# Patient Record
Sex: MALE | Race: WHITE | NOT HISPANIC OR LATINO | Employment: FULL TIME | URBAN - METROPOLITAN AREA
[De-identification: names, ages, dates, MRNs, and addresses within clinical notes are randomized per-mention and may not be internally consistent; named-entity substitution may affect disease eponyms.]

---

## 2017-04-11 LAB
C TRACH RRNA SPEC QL PROBE: NEGATIVE
N GONORRHOEA RRNA SPEC QL PROBE: NEGATIVE

## 2018-08-14 ENCOUNTER — HOSPITAL ENCOUNTER (EMERGENCY)
Facility: HOSPITAL | Age: 33
Discharge: HOME | End: 2018-08-14
Attending: EMERGENCY MEDICINE | Admitting: EMERGENCY MEDICINE
Payer: COMMERCIAL

## 2018-08-14 ENCOUNTER — APPOINTMENT (EMERGENCY)
Dept: CT IMAGING | Facility: HOSPITAL | Age: 33
End: 2018-08-14
Payer: COMMERCIAL

## 2018-08-14 VITALS
TEMPERATURE: 98.5 F | OXYGEN SATURATION: 99 % | SYSTOLIC BLOOD PRESSURE: 144 MMHG | RESPIRATION RATE: 18 BRPM | HEART RATE: 92 BPM | BODY MASS INDEX: 25.2 KG/M2 | DIASTOLIC BLOOD PRESSURE: 65 MMHG | HEIGHT: 71 IN | WEIGHT: 180 LBS

## 2018-08-14 DIAGNOSIS — M54.9 MUSCULOSKELETAL BACK PAIN: Primary | ICD-10-CM

## 2018-08-14 LAB
ANION GAP SERPL CALCULATED.3IONS-SCNC: 9 MMOL/L (ref 4–13)
BASOPHILS # BLD AUTO: 0.05 THOUSANDS/ΜL (ref 0–0.1)
BASOPHILS NFR BLD AUTO: 1 % (ref 0–1)
BUN SERPL-MCNC: 12 MG/DL (ref 5–25)
CALCIUM SERPL-MCNC: 10.1 MG/DL (ref 8.3–10.1)
CHLORIDE SERPL-SCNC: 101 MMOL/L (ref 100–108)
CO2 SERPL-SCNC: 30 MMOL/L (ref 21–32)
CREAT SERPL-MCNC: 0.93 MG/DL (ref 0.6–1.3)
EOSINOPHIL # BLD AUTO: 0.31 THOUSAND/ΜL (ref 0–0.61)
EOSINOPHIL NFR BLD AUTO: 3 % (ref 0–6)
ERYTHROCYTE [DISTWIDTH] IN BLOOD BY AUTOMATED COUNT: 12.7 % (ref 11.6–15.1)
GFR SERPL CREATININE-BSD FRML MDRD: 108 ML/MIN/1.73SQ M
GLUCOSE SERPL-MCNC: 103 MG/DL (ref 65–140)
HCT VFR BLD AUTO: 45.9 % (ref 36.5–49.3)
HGB BLD-MCNC: 16 G/DL (ref 12–17)
IMM GRANULOCYTES # BLD AUTO: 0.04 THOUSAND/UL (ref 0–0.2)
IMM GRANULOCYTES NFR BLD AUTO: 0 % (ref 0–2)
LYMPHOCYTES # BLD AUTO: 3.08 THOUSANDS/ΜL (ref 0.6–4.47)
LYMPHOCYTES NFR BLD AUTO: 29 % (ref 14–44)
MCH RBC QN AUTO: 32.3 PG (ref 26.8–34.3)
MCHC RBC AUTO-ENTMCNC: 34.9 G/DL (ref 31.4–37.4)
MCV RBC AUTO: 93 FL (ref 82–98)
MONOCYTES # BLD AUTO: 0.82 THOUSAND/ΜL (ref 0.17–1.22)
MONOCYTES NFR BLD AUTO: 8 % (ref 4–12)
NEUTROPHILS # BLD AUTO: 6.24 THOUSANDS/ΜL (ref 1.85–7.62)
NEUTS SEG NFR BLD AUTO: 59 % (ref 43–75)
NRBC BLD AUTO-RTO: 0 /100 WBCS
PLATELET # BLD AUTO: 270 THOUSANDS/UL (ref 149–390)
PMV BLD AUTO: 9.5 FL (ref 8.9–12.7)
POTASSIUM SERPL-SCNC: 3.9 MMOL/L (ref 3.5–5.3)
RBC # BLD AUTO: 4.96 MILLION/UL (ref 3.88–5.62)
SODIUM SERPL-SCNC: 140 MMOL/L (ref 136–145)
WBC # BLD AUTO: 10.54 THOUSAND/UL (ref 4.31–10.16)

## 2018-08-14 PROCEDURE — 36415 COLL VENOUS BLD VENIPUNCTURE: CPT | Performed by: EMERGENCY MEDICINE

## 2018-08-14 PROCEDURE — 96374 THER/PROPH/DIAG INJ IV PUSH: CPT

## 2018-08-14 PROCEDURE — 85025 COMPLETE CBC W/AUTO DIFF WBC: CPT | Performed by: EMERGENCY MEDICINE

## 2018-08-14 PROCEDURE — 96375 TX/PRO/DX INJ NEW DRUG ADDON: CPT

## 2018-08-14 PROCEDURE — 96361 HYDRATE IV INFUSION ADD-ON: CPT

## 2018-08-14 PROCEDURE — 99284 EMERGENCY DEPT VISIT MOD MDM: CPT

## 2018-08-14 PROCEDURE — 74176 CT ABD & PELVIS W/O CONTRAST: CPT

## 2018-08-14 PROCEDURE — 80048 BASIC METABOLIC PNL TOTAL CA: CPT | Performed by: EMERGENCY MEDICINE

## 2018-08-14 RX ORDER — MORPHINE SULFATE 4 MG/ML
4 INJECTION, SOLUTION INTRAMUSCULAR; INTRAVENOUS ONCE
Status: COMPLETED | OUTPATIENT
Start: 2018-08-14 | End: 2018-08-14

## 2018-08-14 RX ORDER — METHOCARBAMOL 500 MG/1
500 TABLET, FILM COATED ORAL 2 TIMES DAILY
Qty: 20 TABLET | Refills: 0 | Status: SHIPPED | OUTPATIENT
Start: 2018-08-14 | End: 2018-08-29

## 2018-08-14 RX ORDER — KETOROLAC TROMETHAMINE 30 MG/ML
15 INJECTION, SOLUTION INTRAMUSCULAR; INTRAVENOUS ONCE
Status: COMPLETED | OUTPATIENT
Start: 2018-08-14 | End: 2018-08-14

## 2018-08-14 RX ORDER — NAPROXEN 500 MG/1
500 TABLET ORAL 2 TIMES DAILY WITH MEALS
Qty: 30 TABLET | Refills: 0 | Status: SHIPPED | OUTPATIENT
Start: 2018-08-14 | End: 2018-08-29

## 2018-08-14 RX ORDER — LIDOCAINE 50 MG/G
1 PATCH TOPICAL ONCE
Status: DISCONTINUED | OUTPATIENT
Start: 2018-08-14 | End: 2018-08-14 | Stop reason: HOSPADM

## 2018-08-14 RX ADMIN — SODIUM CHLORIDE 1000 ML: 0.9 INJECTION, SOLUTION INTRAVENOUS at 19:27

## 2018-08-14 RX ADMIN — KETOROLAC TROMETHAMINE 15 MG: 30 INJECTION, SOLUTION INTRAMUSCULAR at 20:01

## 2018-08-14 RX ADMIN — LIDOCAINE 1 PATCH: 50 PATCH TOPICAL at 20:15

## 2018-08-14 RX ADMIN — MORPHINE SULFATE 4 MG: 4 INJECTION INTRAVENOUS at 19:27

## 2018-08-14 NOTE — ED PROVIDER NOTES
History  Chief Complaint   Patient presents with    Back Pain     got off couch earlier today and having back pain radiating to left leg/hip     HPI    This is a 36 year male that presents today with back pain  Patient states he was getting off from his couch today and felt a sudden onset of pain in his back that radiates down his left hip but also down his abdomen  States pain is flank area  States mild nausea but no vomiting  Also states he has some dysuria when going to the bathroom  No hematuria  Denies any history of kidney stones  Patient does have history of chronic back pain but states this feels different  States he has been having difficulty with ambulation  No numbness or tingling in his genital area  No urinary retention  No IV drug use  No fevers or chills  None       History reviewed  No pertinent past medical history  History reviewed  No pertinent surgical history  Family History   Problem Relation Age of Onset    Diabetes Father      I have reviewed and agree with the history as documented  Social History   Substance Use Topics    Smoking status: Current Every Day Smoker    Smokeless tobacco: Never Used    Alcohol use No        Review of Systems   Constitutional: Negative  Negative for diaphoresis and fever  HENT: Negative  Respiratory: Negative  Negative for cough, shortness of breath and wheezing  Cardiovascular: Negative  Negative for chest pain, palpitations and leg swelling  Gastrointestinal: Negative for abdominal distention, abdominal pain, nausea and vomiting  Genitourinary: Positive for dysuria and flank pain  Musculoskeletal: Positive for back pain  Skin: Negative  Neurological: Negative  Psychiatric/Behavioral: Negative  All other systems reviewed and are negative  Physical Exam  Physical Exam   Constitutional: He is oriented to person, place, and time  He appears well-developed and well-nourished  No distress     HENT: Head: Normocephalic and atraumatic  Nose: Nose normal    Mouth/Throat: Oropharynx is clear and moist    Eyes: Conjunctivae and EOM are normal  Pupils are equal, round, and reactive to light  Neck: Normal range of motion  Neck supple  Cardiovascular: Normal rate, regular rhythm and normal heart sounds  No murmur heard  Pulmonary/Chest: Effort normal and breath sounds normal  No respiratory distress  He has no wheezes  He has no rales  Abdominal: Soft  Bowel sounds are normal  He exhibits no distension  There is tenderness  There is no rebound and no guarding  Mild left lower abdominal pain     Musculoskeletal: Normal range of motion  He exhibits no edema, tenderness or deformity  Left flank pain and lower back tenderness  No stepoffs  No rashes   Neurological: He is alert and oriented to person, place, and time  No cranial nerve deficit  Skin: Skin is warm and dry  No rash noted  He is not diaphoretic  No pallor  Psychiatric: He has a normal mood and affect  Vitals reviewed        Vital Signs  ED Triage Vitals [08/14/18 1827]   Temperature Pulse Respirations Blood Pressure SpO2   98 5 °F (36 9 °C) 92 18 144/65 99 %      Temp src Heart Rate Source Patient Position - Orthostatic VS BP Location FiO2 (%)   -- -- -- -- --      Pain Score       9           Vitals:    08/14/18 1827   BP: 144/65   Pulse: 92       Visual Acuity      ED Medications  Medications   sodium chloride 0 9 % bolus 1,000 mL (0 mL Intravenous Stopped 8/14/18 2040)   morphine (PF) 4 mg/mL injection 4 mg (4 mg Intravenous Given 8/14/18 1927)   ketorolac (TORADOL) injection 15 mg (15 mg Intravenous Given 8/14/18 2001)       Diagnostic Studies  Results Reviewed     Procedure Component Value Units Date/Time    Basic metabolic panel [[id number]] Collected:  08/14/18 1928    Lab Status:  Final result Specimen:  Blood from Arm, Left Updated:  08/14/18 1948     Sodium 140 mmol/L      Potassium 3 9 mmol/L      Chloride 101 mmol/L      CO2 30 mmol/L      Anion Gap 9 mmol/L      BUN 12 mg/dL      Creatinine 0 93 mg/dL      Glucose 103 mg/dL      Calcium 10 1 mg/dL      eGFR 108 ml/min/1 73sq m     Narrative:         National Kidney Disease Education Program recommendations are as follows:  GFR calculation is accurate only with a steady state creatinine  Chronic Kidney disease less than 60 ml/min/1 73 sq  meters  Kidney failure less than 15 ml/min/1 73 sq  meters  CBC and differential [[id number]]  (Abnormal) Collected:  08/14/18 1928    Lab Status:  Final result Specimen:  Blood from Arm, Left Updated:  08/14/18 1937     WBC 10 54 (H) Thousand/uL      RBC 4 96 Million/uL      Hemoglobin 16 0 g/dL      Hematocrit 45 9 %      MCV 93 fL      MCH 32 3 pg      MCHC 34 9 g/dL      RDW 12 7 %      MPV 9 5 fL      Platelets 793 Thousands/uL      nRBC 0 /100 WBCs      Neutrophils Relative 59 %      Immat GRANS % 0 %      Lymphocytes Relative 29 %      Monocytes Relative 8 %      Eosinophils Relative 3 %      Basophils Relative 1 %      Neutrophils Absolute 6 24 Thousands/µL      Immature Grans Absolute 0 04 Thousand/uL      Lymphocytes Absolute 3 08 Thousands/µL      Monocytes Absolute 0 82 Thousand/µL      Eosinophils Absolute 0 31 Thousand/µL      Basophils Absolute 0 05 Thousands/µL                  CT renal stone study abdomen pelvis without contrast   Final Result by Autumn Nicole MD (08/14 2005)      No acute inflammatory changes in the abdomen or pelvis  No obstructive uropathy  Workstation performed: [id number]                    Procedures  Procedures       Phone Contacts  ED Phone Contact    ED Course  ED Course as of Aug 16 1102   Tue Aug 14, 2018   2029 Will discharge patient, advised to followup with orthopedics   Doing better with pain                                 MDM  CritCare Time    Disposition  Final diagnoses:   Musculoskeletal back pain     Time reflects when diagnosis was documented in both MDM as applicable and the Disposition within this note     Time User Action Codes Description Comment    8/14/2018  8:30 PM Nicolas Gamble Add [M54 9] Musculoskeletal back pain       ED Disposition     ED Disposition Condition Comment    Discharge  Meseret Noriega discharge to home/self care  Condition at discharge: Good        Follow-up Information     Follow up With Specialties Details Why [address], MD Orthopedic Surgery Schedule an appointment as soon as possible for a visit  [address]  [facility]      Abena Hunter MD Family Medicine Schedule an appointment as soon as possible for a visit  [address] [facility] [address] [facility]  [phone]            Discharge Medication List as of 8/14/2018  8:31 PM      START taking these medications    Details   methocarbamol (ROBAXIN) 500 mg tablet Take 1 tablet (500 mg total) by mouth 2 (two) times a day, Starting Tue 8/14/2018, Print      naproxen (NAPROSYN) 500 mg tablet Take 1 tablet (500 mg total) by mouth 2 (two) times a day with meals, Starting Tue 8/14/2018, Print           No discharge procedures on file      ED Provider  Electronically Signed by           Eitan Soto MD  08/16/18 1312

## 2018-08-15 ENCOUNTER — TELEPHONE (OUTPATIENT)
Dept: PAIN MEDICINE | Facility: MEDICAL CENTER | Age: 33
End: 2018-08-15

## 2018-08-15 VITALS — BODY MASS INDEX: 26.88 KG/M2 | WEIGHT: 192 LBS | HEIGHT: 71 IN

## 2018-08-15 DIAGNOSIS — M51.37 DEGENERATIVE DISC DISEASE AT L5-S1 LEVEL: Primary | ICD-10-CM

## 2018-08-15 DIAGNOSIS — M54.41 ACUTE BILATERAL LOW BACK PAIN WITH BILATERAL SCIATICA: ICD-10-CM

## 2018-08-15 DIAGNOSIS — M54.16 RADICULOPATHY, LUMBAR REGION: ICD-10-CM

## 2018-08-15 DIAGNOSIS — M54.42 ACUTE BILATERAL LOW BACK PAIN WITH BILATERAL SCIATICA: ICD-10-CM

## 2018-08-15 PROCEDURE — 99204 OFFICE O/P NEW MOD 45 MIN: CPT | Performed by: FAMILY MEDICINE

## 2018-08-15 RX ORDER — TRAMADOL HYDROCHLORIDE 50 MG/1
50 TABLET ORAL EVERY 8 HOURS PRN
Qty: 30 TABLET | Refills: 0 | Status: SHIPPED | OUTPATIENT
Start: 2018-08-15 | End: 2018-08-24 | Stop reason: SDUPTHER

## 2018-08-15 RX ORDER — PREDNISONE 20 MG/1
20 TABLET ORAL DAILY
Qty: 6 TABLET | Refills: 0 | Status: SHIPPED | OUTPATIENT
Start: 2018-08-15 | End: 2018-08-21
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
## 2018-08-15 NOTE — DISCHARGE INSTRUCTIONS
Acute Low Back Pain   WHAT YOU NEED TO KNOW:   Acute low back pain is sudden discomfort in your lower back area that lasts for up to 6 weeks  The discomfort makes it difficult to tolerate activity  DISCHARGE INSTRUCTIONS:   Seek care immediately or call 911 if:   · You have severe pain  · You have sudden stiffness and heaviness on both buttocks down to both legs  · You have numbness or weakness in one leg, or pain in both legs  · You have numbness in your genital area or across your lower back  · You cannot control your urine or bowel movements  Contact your healthcare provider if:   · You have a fever  · You have pain at night or when you rest     · Your pain does not get better with treatment  · You have pain that worsens when you cough or sneeze  · You suddenly feel something pop or snap in your back  · You have questions or concerns about your condition or care  Medicines: The following medicines may be ordered by your healthcare provider:  · Acetaminophen  decreases pain  It is available without a doctor's order  Ask how much to take and how often to take it  Follow directions  Acetaminophen can cause liver damage if not taken correctly  · NSAIDs  help decrease swelling and pain  This medicine is available with or without a doctor's order  NSAIDs can cause stomach bleeding or kidney problems in certain people  If you take blood thinner medicine, always ask your healthcare provider if NSAIDs are safe for you  Always read the medicine label and follow directions  · Prescription pain medicine  may be given  Ask your healthcare provider how to take this medicine safely  · Muscle relaxers  decrease pain by relaxing the muscles in your lower spine  · Take your medicine as directed  Contact your healthcare provider if you think your medicine is not helping or if you have side effects  Tell him of her if you are allergic to any medicine   Keep a list of the medicines, vitamins, and herbs you take  Include the amounts, and when and why you take them  Bring the list or the pill bottles to follow-up visits  Carry your medicine list with you in case of an emergency  Self-care:   · Stay active  as much as you can without causing more pain  Bed rest could make your back pain worse  Start with some light exercises such as walking  Avoid heavy lifting until your pain is gone  Ask for more information about the activities or exercises that are right for you  · Ice  helps decrease swelling, pain, and muscle spams  Put crushed ice in a plastic bag  Cover it with a towel  Place it on your lower back for 20 to 30 minutes every 2 hours  Do this for about 2 to 3 days after your pain starts, or as directed  · Heat  helps decrease pain and muscle spasms  Start to use heat after treatment with ice has stopped  Use a small towel dampened with warm water or a heating pad, or sit in a warm bath  Apply heat on the area for 20 to 30 minutes every 2 hours for as many days as directed  Alternate heat and ice  Prevent acute low back pain:   · Use proper body mechanics  ¨ Bend at the hips and knees when you  objects  Do not bend from the waist  Use your leg muscles as you lift the load  Do not use your back  Keep the object close to your chest as you lift it  Try not to twist or lift anything above your waist     ¨ Change your position often when you stand for long periods of time  Rest one foot on a small box or footrest, and then switch to the other foot often  ¨ Try not to sit for long periods of time  When you do, sit in a straight-backed chair with your feet flat on the floor  Never reach, pull, or push while you are sitting  · Do exercises that strengthen your back muscles  Warm up before you exercise  Ask your healthcare provider the best exercises for you  · Maintain a healthy weight  Ask your healthcare provider how much you should weigh   Ask him to help you create a weight loss plan if you are overweight  Follow up with your healthcare provider as directed:  Return for a follow-up visit if you still have pain after 1 to 3 weeks of treatment  You may need to visit an orthopedist if your back pain lasts more than 6 to 12 weeks  Write down your questions so you remember to ask them during your visits  © 2017 [address] [employer]  Information is for End User's use only and may not be sold, redistributed or otherwise used for commercial purposes  All illustrations and images included in CareNotes® are the copyrighted property of [employer]  or [employer]  The above information is an  only  It is not intended as medical advice for individual conditions or treatments  Talk to your doctor, nurse or pharmacist before following any medical regimen to see if it is safe and effective for you

## 2018-08-15 NOTE — LETTER
August 15, 2018     Patient: Susannah Giraldo   YOB: 1985   Date of Visit: 8/15/2018       To Whom it May Concern:    Susannah Giraldo is under my professional care  He was seen in my office on 8/15/2018  He is unable to return to work at this time due to back pain  He is being sent for MRI and will be re-assessed at next visit  If you have any questions or concerns, please don't hesitate to call           Sincerely,          Julieth [employer], DO        CC: No Recipients

## 2018-08-15 NOTE — TELEPHONE ENCOUNTER
Patients wife called stating patient was given meds in the ED last night and given meds today at [address]   stated note sure if patient should be taking all the meds  Also wants to know if patient could get an Rx for an MRI   C/b [phone]

## 2018-08-22 ENCOUNTER — HOSPITAL ENCOUNTER (OUTPATIENT)
Dept: MRI IMAGING | Facility: HOSPITAL | Age: 33
Discharge: HOME | End: 2018-08-22
Payer: COMMERCIAL

## 2018-08-22 DIAGNOSIS — M54.41 ACUTE BILATERAL LOW BACK PAIN WITH BILATERAL SCIATICA: ICD-10-CM

## 2018-08-22 DIAGNOSIS — M51.37 DEGENERATIVE DISC DISEASE AT L5-S1 LEVEL: ICD-10-CM

## 2018-08-22 DIAGNOSIS — M54.42 ACUTE BILATERAL LOW BACK PAIN WITH BILATERAL SCIATICA: ICD-10-CM

## 2018-08-22 DIAGNOSIS — M54.16 RADICULOPATHY, LUMBAR REGION: ICD-10-CM

## 2018-08-22 PROCEDURE — 72148 MRI LUMBAR SPINE W/O DYE: CPT

## 2018-08-24 DIAGNOSIS — M54.41 ACUTE BILATERAL LOW BACK PAIN WITH BILATERAL SCIATICA: ICD-10-CM

## 2018-08-24 DIAGNOSIS — M54.42 ACUTE BILATERAL LOW BACK PAIN WITH BILATERAL SCIATICA: ICD-10-CM

## 2018-08-24 RX ORDER — TRAMADOL HYDROCHLORIDE 50 MG/1
50 TABLET ORAL EVERY 8 HOURS PRN
Qty: 30 TABLET | Refills: 0 | Status: SHIPPED | OUTPATIENT
Start: 2018-08-24 | End: 2018-08-29

## 2018-08-24 NOTE — TELEPHONE ENCOUNTER
Call from Damaris Narayan, patients wife   Call back # [phone]  Larissa Scott is requesting a call asap to discuss patients current pain  I offered an appointment with you today in [facility], they declined due to distance  Patient had an MRI on Wednesday 08/22/18     IMPRESSION:     L4-5 and L5-S1 central and left paracentral disc herniations, larger at L4-5 where there is distortion of the left anterior lateral aspect of the thecal sac and posterior displacement of the L5 nerve  Correlate for corresponding radiculopathy      Patient rescheduled appt with you to 09/10/18 because he has to work next week

## 2018-08-24 NOTE — TELEPHONE ENCOUNTER
Chaz Vega  Call Back: [phone]  Dr Stella Banerjee    Patient wife called back stating that her  is in excruciating pain and his foot is completely numb he can't feel his foot  I once again asked his wife if the patient would be able to come in today  She said no due to patient having to work  Patient's wife said that she want's a call back by the end of an work day, or she is calling the   Patient wife said they will go to the ER if they have to but they weren't any help the last time they went

## 2018-08-29 ENCOUNTER — OFFICE VISIT (OUTPATIENT)
Dept: OBGYN CLINIC | Facility: CLINIC | Age: 33
End: 2018-08-29
Payer: COMMERCIAL

## 2018-08-29 VITALS
HEIGHT: 71 IN | DIASTOLIC BLOOD PRESSURE: 67 MMHG | SYSTOLIC BLOOD PRESSURE: 103 MMHG | HEART RATE: 76 BPM | WEIGHT: 191 LBS | BODY MASS INDEX: 26.74 KG/M2

## 2018-08-29 DIAGNOSIS — M51.36 DEGENERATIVE DISC DISEASE, LUMBAR: ICD-10-CM

## 2018-08-29 DIAGNOSIS — M54.16 RADICULOPATHY, LUMBAR REGION: ICD-10-CM

## 2018-08-29 DIAGNOSIS — M51.26 PROTRUSION OF LUMBAR INTERVERTEBRAL DISC: Primary | ICD-10-CM

## 2018-08-29 PROCEDURE — 99213 OFFICE O/P EST LOW 20 MIN: CPT | Performed by: FAMILY MEDICINE

## 2018-08-29 RX ORDER — PREDNISONE 20 MG/1
20 TABLET ORAL DAILY
Qty: 6 TABLET | Refills: 0 | Status: SHIPPED | OUTPATIENT
Start: 2018-08-29 | End: 2018-09-04

## 2018-08-29 NOTE — PROGRESS NOTES
Assessment/Plan:  Assessment/Plan   Diagnoses and all orders for this visit:    Protrusion of lumbar intervertebral disc  -     Ambulatory referral to Pain Management; Future  -     predniSONE 20 mg tablet; Take 1 tablet (20 mg total) by mouth daily for 6 days    Radiculopathy, lumbar region  -     Ambulatory referral to Pain Management; Future  -     predniSONE 20 mg tablet; Take 1 tablet (20 mg total) by mouth daily for 6 days    Degenerative disc disease, lumbar          62-year-old male with worsening low back pain and left lower extremity radiculopathy  Discussed with patient physical exam, MRI findings, impression, and plan  MRI lumbar spine is noted for L4-L5 disc desiccation with mild disc height loss, with small left paracentral disc protrusion distal in the left anterolateral aspect of the thecal sac and displacing the left L5 nerve  Posteriorly, and L5-S1 mild annular bulging with small central left paracentral annular fissure  His physical exam is noted for weakness with left hip flexion and knee extension with decreased sensation light touch dermatomes   L4-S1 the left lower extremity  At this time I will refer him to pain management for further evaluation and recommendation treatment options  I will also prescribe him course of oral prednisone 20 mg once daily for 6 days  Further care and management as per pain management  Subjective:   Patient ID: Herminia Arroyo is a 29 y o  male  Chief Complaint   Patient presents with    Lower Back - Pain, Follow-up       62-year-old male following up for low back pain of worsening chronic low back pain  He was last seen 2 weeks ago which point he was referred for MRI of the lumbar spine due to sudden worsening of left lower extremity weakness and numbness  He was prescribed course of prednisone  Today reports mild improvement since taking the prednisone    He still continues have pain that is described as localized to the lumbosacral area, constant, sharp, radiating distally to both lower extremities, and associated numbness/tingling of both lower extremities  Back Pain   This is a recurrent problem  The current episode started 1 to 4 weeks ago  The problem occurs constantly  The problem has been gradually improving  Associated symptoms include numbness and weakness  The symptoms are aggravated by walking, twisting and standing  He has tried rest (Oral prednisone) for the symptoms  The treatment provided mild relief  Review of Systems   Musculoskeletal: Positive for back pain  Neurological: Positive for weakness and numbness  Objective:  Vitals:    08/29/18 0936   BP: 103/67   Pulse: 76   Weight: 86 6 kg (191 lb)   Height: 5' 11" (1 803 m)     Right Hip Exam     Muscle Strength   Flexion: 5/5       Left Hip Exam     Muscle Strength   Flexion: 4/5       Back Exam     Range of Motion   Extension: abnormal   Flexion: abnormal   Lateral Bend Right: abnormal   Lateral Bend Left: abnormal   Rotation Right: abnormal   Rotation Left: abnormal     Muscle Strength   Right Quadriceps:  5/5   Left Quadriceps:  4/5     Other   Sensation: decreased  Gait: normal     Comments:  Decreased sensation light touch dermatomes L4-S1 left lower extremity            Physical Exam   Constitutional: He is oriented to person, place, and time  He appears well-developed  HENT:   Head: Normocephalic and atraumatic  Eyes: Conjunctivae are normal    Neck: No tracheal deviation present  Cardiovascular: Normal rate  Pulmonary/Chest: Effort normal  No respiratory distress  Abdominal: He exhibits no distension  Neurological: He is alert and oriented to person, place, and time  Skin: Skin is warm and dry  Psychiatric: He has a normal mood and affect  His behavior is normal    Nursing note and vitals reviewed  I have personally reviewed pertinent films in PACS and my interpretation is Lumbosacral disc herniation L4-L5 and L5-S1

## 2018-11-13 ENCOUNTER — TELEPHONE (OUTPATIENT)
Dept: GASTROENTEROLOGY | Facility: CLINIC | Age: 33
End: 2018-11-13

## 2018-11-13 ENCOUNTER — TRANSCRIBE ORDERS (OUTPATIENT)
Dept: LAB | Facility: CLINIC | Age: 33
End: 2018-11-13

## 2018-11-13 ENCOUNTER — HOSPITAL ENCOUNTER (OUTPATIENT)
Dept: CT IMAGING | Facility: HOSPITAL | Age: 33
Discharge: HOME | End: 2018-11-13
Payer: COMMERCIAL

## 2018-11-13 ENCOUNTER — APPOINTMENT (OUTPATIENT)
Dept: LAB | Facility: CLINIC | Age: 33
End: 2018-11-13
Payer: COMMERCIAL

## 2018-11-13 ENCOUNTER — OFFICE VISIT (OUTPATIENT)
Dept: GASTROENTEROLOGY | Facility: CLINIC | Age: 33
End: 2018-11-13

## 2018-11-13 VITALS
RESPIRATION RATE: 16 BRPM | DIASTOLIC BLOOD PRESSURE: 60 MMHG | WEIGHT: 201 LBS | HEIGHT: 71 IN | SYSTOLIC BLOOD PRESSURE: 112 MMHG | HEART RATE: 94 BPM | BODY MASS INDEX: 28.14 KG/M2

## 2018-11-13 DIAGNOSIS — R10.33 PERIUMBILICAL PAIN, CHRONIC: ICD-10-CM

## 2018-11-13 DIAGNOSIS — G43.819 OTHER MIGRAINE WITHOUT STATUS MIGRAINOSUS, INTRACTABLE: ICD-10-CM

## 2018-11-13 DIAGNOSIS — K64.8 INTERNAL HEMORRHOIDS: ICD-10-CM

## 2018-11-13 DIAGNOSIS — K61.1 PERIRECTAL ABSCESS: ICD-10-CM

## 2018-11-13 DIAGNOSIS — Z87.19 HISTORY OF RECTAL ABSCESS: ICD-10-CM

## 2018-11-13 DIAGNOSIS — G89.29 PERIUMBILICAL PAIN, CHRONIC: ICD-10-CM

## 2018-11-13 DIAGNOSIS — R13.13 PHARYNGEAL DYSPHAGIA: ICD-10-CM

## 2018-11-13 DIAGNOSIS — R11.0 NAUSEA: ICD-10-CM

## 2018-11-13 DIAGNOSIS — R53.83 OTHER FATIGUE: ICD-10-CM

## 2018-11-13 DIAGNOSIS — K62.89 ANAL OR RECTAL PAIN: Primary | ICD-10-CM

## 2018-11-13 DIAGNOSIS — R68.83 CHILLS: ICD-10-CM

## 2018-11-13 DIAGNOSIS — R63.0 DECREASED APPETITE: ICD-10-CM

## 2018-11-13 DIAGNOSIS — K62.89 RECTAL PAIN: ICD-10-CM

## 2018-11-13 DIAGNOSIS — K61.1 PERI-RECTAL ABSCESS: ICD-10-CM

## 2018-11-13 DIAGNOSIS — R19.8 UMBILICAL BLEEDING: ICD-10-CM

## 2018-11-13 DIAGNOSIS — K62.89 RECTAL PAIN: Primary | ICD-10-CM

## 2018-11-13 PROBLEM — G43.909 MIGRAINE: Status: ACTIVE | Noted: 2018-11-13

## 2018-11-13 PROBLEM — R13.10 DYSPHAGIA: Status: ACTIVE | Noted: 2018-11-13

## 2018-11-13 PROBLEM — R11.2 NAUSEA AND VOMITING: Status: ACTIVE | Noted: 2018-11-13

## 2018-11-13 LAB
ALBUMIN SERPL BCP-MCNC: 4 G/DL (ref 3.5–5)
ALP SERPL-CCNC: 74 U/L (ref 46–116)
ALT SERPL W P-5'-P-CCNC: 25 U/L (ref 12–78)
ANION GAP SERPL CALCULATED.3IONS-SCNC: 4 MMOL/L (ref 4–13)
AST SERPL W P-5'-P-CCNC: 15 U/L (ref 5–45)
BASOPHILS # BLD AUTO: 0.06 THOUSANDS/ΜL (ref 0–0.1)
BASOPHILS NFR BLD AUTO: 1 % (ref 0–1)
BILIRUB SERPL-MCNC: 0.4 MG/DL (ref 0.2–1)
BUN SERPL-MCNC: 9 MG/DL (ref 5–25)
CALCIUM SERPL-MCNC: 8.8 MG/DL (ref 8.3–10.1)
CHLORIDE SERPL-SCNC: 105 MMOL/L (ref 100–108)
CO2 SERPL-SCNC: 29 MMOL/L (ref 21–32)
CREAT SERPL-MCNC: 0.82 MG/DL (ref 0.6–1.3)
EOSINOPHIL # BLD AUTO: 0.32 THOUSAND/ΜL (ref 0–0.61)
EOSINOPHIL NFR BLD AUTO: 3 % (ref 0–6)
ERYTHROCYTE [DISTWIDTH] IN BLOOD BY AUTOMATED COUNT: 12.7 % (ref 11.6–15.1)
ERYTHROCYTE [SEDIMENTATION RATE] IN BLOOD: 7 MM/HOUR (ref 0–10)
FERRITIN SERPL-MCNC: 56 NG/ML (ref 8–388)
GFR SERPL CREATININE-BSD FRML MDRD: 117 ML/MIN/1.73SQ M
GLUCOSE P FAST SERPL-MCNC: 86 MG/DL (ref 65–99)
HCT VFR BLD AUTO: 42.3 % (ref 36.5–49.3)
HGB BLD-MCNC: 14.1 G/DL (ref 12–17)
IMM GRANULOCYTES # BLD AUTO: 0.03 THOUSAND/UL (ref 0–0.2)
IMM GRANULOCYTES NFR BLD AUTO: 0 % (ref 0–2)
LYMPHOCYTES # BLD AUTO: 2.92 THOUSANDS/ΜL (ref 0.6–4.47)
LYMPHOCYTES NFR BLD AUTO: 23 % (ref 14–44)
MCH RBC QN AUTO: 32.3 PG (ref 26.8–34.3)
MCHC RBC AUTO-ENTMCNC: 33.3 G/DL (ref 31.4–37.4)
MCV RBC AUTO: 97 FL (ref 82–98)
MONOCYTES # BLD AUTO: 0.8 THOUSAND/ΜL (ref 0.17–1.22)
MONOCYTES NFR BLD AUTO: 6 % (ref 4–12)
NEUTROPHILS # BLD AUTO: 8.48 THOUSANDS/ΜL (ref 1.85–7.62)
NEUTS SEG NFR BLD AUTO: 67 % (ref 43–75)
NRBC BLD AUTO-RTO: 0 /100 WBCS
PLATELET # BLD AUTO: 297 THOUSANDS/UL (ref 149–390)
PMV BLD AUTO: 9.8 FL (ref 8.9–12.7)
POTASSIUM SERPL-SCNC: 4.2 MMOL/L (ref 3.5–5.3)
PROT SERPL-MCNC: 6.9 G/DL (ref 6.4–8.2)
RBC # BLD AUTO: 4.37 MILLION/UL (ref 3.88–5.62)
SODIUM SERPL-SCNC: 138 MMOL/L (ref 136–145)
WBC # BLD AUTO: 12.61 THOUSAND/UL (ref 4.31–10.16)

## 2018-11-13 PROCEDURE — 82728 ASSAY OF FERRITIN: CPT

## 2018-11-13 PROCEDURE — 74177 CT ABD & PELVIS W/CONTRAST: CPT

## 2018-11-13 PROCEDURE — 86255 FLUORESCENT ANTIBODY SCREEN: CPT

## 2018-11-13 PROCEDURE — 36415 COLL VENOUS BLD VENIPUNCTURE: CPT

## 2018-11-13 PROCEDURE — 86671 FUNGUS NES ANTIBODY: CPT

## 2018-11-13 PROCEDURE — 80053 COMPREHEN METABOLIC PANEL: CPT

## 2018-11-13 PROCEDURE — 85025 COMPLETE CBC W/AUTO DIFF WBC: CPT

## 2018-11-13 PROCEDURE — 99203 OFFICE O/P NEW LOW 30 MIN: CPT | Performed by: INTERNAL MEDICINE

## 2018-11-13 PROCEDURE — 83516 IMMUNOASSAY NONANTIBODY: CPT

## 2018-11-13 PROCEDURE — 82784 ASSAY IGA/IGD/IGG/IGM EACH: CPT

## 2018-11-13 PROCEDURE — 85652 RBC SED RATE AUTOMATED: CPT

## 2018-11-13 RX ORDER — CIPROFLOXACIN 500 MG/1
500 TABLET, FILM COATED ORAL EVERY 12 HOURS SCHEDULED
Qty: 28 TABLET | Refills: 0 | Status: ON HOLD | OUTPATIENT
Start: 2018-11-13 | End: 2018-11-21 | Stop reason: ALTCHOICE

## 2018-11-13 RX ORDER — ONDANSETRON 4 MG/1
4 TABLET, FILM COATED ORAL EVERY 8 HOURS PRN
Qty: 20 TABLET | Refills: 0 | Status: SHIPPED | OUTPATIENT
Start: 2018-11-13 | End: 2019-02-06 | Stop reason: SDUPTHER

## 2018-11-13 RX ORDER — OMEPRAZOLE 20 MG/1
20 CAPSULE, DELAYED RELEASE ORAL
Qty: 60 CAPSULE | Refills: 0 | Status: SHIPPED | OUTPATIENT
Start: 2018-11-13 | End: 2019-03-14 | Stop reason: HOSPADM

## 2018-11-13 RX ORDER — METRONIDAZOLE 500 MG/1
500 TABLET ORAL EVERY 12 HOURS SCHEDULED
Qty: 28 TABLET | Refills: 0 | Status: SHIPPED | OUTPATIENT
Start: 2018-11-13 | End: 2018-11-27

## 2018-11-13 RX ADMIN — IOHEXOL 100 ML: 350 INJECTION, SOLUTION INTRAVENOUS at 13:39

## 2018-11-13 RX ADMIN — IOHEXOL 50 ML: 240 INJECTION, SOLUTION INTRATHECAL; INTRAVASCULAR; INTRAVENOUS; ORAL at 11:30

## 2018-11-13 NOTE — PROGRESS NOTES
Assessment/Plan:    Rectal abscess  Fatigue  Chills  Poor appetite with nausea and vomiting  Abdominal pain  Umbilical bleeding    Rectal pain focused on left side where he has had two rectal abscesses drained recently, fatigue, chills, weight loss,no appetite, left side extreme tenderness with rectal exam- could not even do an internal due to pain, - patient feels it may be another abscess:  1  Blood work today including CBC with differential  2  Stat CT with IV and oral contrast of abdomen and pelvis    History of rectal abscess x2, decreased appetite, weight loss,   Initial colonoscopy    Nausea, vomiting, dysphagia, history of recent antibiotics  1  Endoscopy  2  Omeprazole b i d  Addendum:   Patient with elevated white blood cell count and perirectal swelling on the left side with left side rectal pain  Patient instructed on Cipro and Flagyl to take as prescribed and I sent over Zofran in case he gets sick to his stomach from the antibiotics  If his symptoms worsen on the antibiotics he has to go to the emergency room and we talked about fever, nausea, vomiting, worsening rectal pain or rectal discharge  Diagnoses and all orders for this visit:    Rectal pain  -     Sedimentation rate, automated; Future  -     Celiac Disease Antibody Profile; Future  -     CBC and differential; Future  -     Ferritin; Future  -     Comprehensive metabolic panel; Future  -     Inflammatory Bowel Disease-IBD; Future  -     Calprotectin,Fecal; Future  -     CT abdomen pelvis w contrast; Future    History of rectal abscess  -     Sedimentation rate, automated; Future  -     Celiac Disease Antibody Profile; Future  -     CBC and differential; Future  -     Ferritin; Future  -     Comprehensive metabolic panel; Future  -     Inflammatory Bowel Disease-IBD; Future  -     Calprotectin,Fecal; Future  -     CT abdomen pelvis w contrast; Future    Nausea  -     Sedimentation rate, automated;  Future  -     Celiac Disease Antibody Profile; Future  -     CBC and differential; Future  -     Ferritin; Future  -     Comprehensive metabolic panel; Future  -     Inflammatory Bowel Disease-IBD; Future  -     Calprotectin,Fecal; Future  -     CT abdomen pelvis w contrast; Future  -     omeprazole (PriLOSEC) 20 mg delayed release capsule; Take 1 capsule (20 mg total) by mouth 2 (two) times a day before meals for 30 days  -     ondansetron (ZOFRAN) 4 mg tablet; Take 1 tablet (4 mg total) by mouth every 8 (eight) hours as needed for nausea or vomiting    Pharyngeal dysphagia  -     Sedimentation rate, automated; Future  -     Celiac Disease Antibody Profile; Future  -     CBC and differential; Future  -     Ferritin; Future  -     Comprehensive metabolic panel; Future  -     Inflammatory Bowel Disease-IBD; Future  -     Calprotectin,Fecal; Future  -     omeprazole (PriLOSEC) 20 mg delayed release capsule; Take 1 capsule (20 mg total) by mouth 2 (two) times a day before meals for 30 days    Periumbilical pain, chronic  -     Sedimentation rate, automated; Future  -     Celiac Disease Antibody Profile; Future  -     CBC and differential; Future  -     Ferritin; Future  -     Comprehensive metabolic panel; Future  -     Inflammatory Bowel Disease-IBD; Future  -     Calprotectin,Fecal; Future  -     CT abdomen pelvis w contrast; Future    Umbilical bleeding  -     Sedimentation rate, automated; Future  -     Celiac Disease Antibody Profile; Future  -     CBC and differential; Future  -     Ferritin; Future  -     Comprehensive metabolic panel; Future  -     Inflammatory Bowel Disease-IBD; Future  -     Calprotectin,Fecal; Future  -     CT abdomen pelvis w contrast; Future    Internal hemorrhoids  -     Sedimentation rate, automated; Future  -     Celiac Disease Antibody Profile; Future  -     CBC and differential; Future  -     Ferritin; Future  -     Comprehensive metabolic panel; Future  -     Inflammatory Bowel Disease-IBD;  Future  - Calprotectin,Fecal; Future    Other fatigue    Decreased appetite    Other migraine without status migrainosus, intractable    Toshia-rectal abscess  -     ciprofloxacin (CIPRO) 500 mg tablet; Take 1 tablet (500 mg total) by mouth every 12 (twelve) hours for 14 days  -     metroNIDAZOLE (FLAGYL) 500 mg tablet; Take 1 tablet (500 mg total) by mouth every 12 (twelve) hours for 14 days    Chills    Perirectal abscess            Subjective:      Patient ID: Rehan Milan is a 29 y o  male  36-year-old male who was initially seen in April for rectal abscess and had I&D done and was on a antibiotics then he had same symptoms which included chills, rectal pain, decreased appetite and had another incision and drainage done at the and of October 2018  The surgeon's concern was that it may be inflammatory in nature because patient does not have any history of rectal manipulation and recommendation was for colonoscopy  He is here today complaining of fatigue, chills, left side rectal pain, change in bowel habits two from daily soft inform to every other day to daily loose without melena or hematochezia  Upon rectal exam as I touched his left buttock he had extreme pain in his left rectal area and so I did not do an internal exam but we will obtain a stat CT scan  He has had some weight loss recently and nausea, vomiting and dysphagia  He does not take any medications  He continues to smoke cigarettes and he does smoke marijuana every night  I did ask him to begin the smoking patches that the state prescribed and also to not smoke any marijuana   He also reports on and off over the past couple years intermittent periumbilical pain that feels like she has tearing in nature not associated with food or bowel movements and then some bleeding in his umbilicus about the same time  Recent CT with IV contrast did not show anything abnormal in his colon or she has small intestine    He has no family history of colon cancer or of inflammatory bowel disorder  He feels very fatigued and has more recently had chills and so instead of CT enterography as I initially was planning we will get a stat CT of his abdomen and pelvis with IV and p o  contrast   He will also have an endoscopy for his upper GI symptoms and a colonoscopy  He will obtain blood work today and stool tests when he is able  He will follow-up 1- 2 weeks after his testing is complete  He drives [place] 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  He does not have a PCP and so I did give him a referral to Dr Mckinley Knife        The following portions of the patient's history were reviewed and updated as appropriate: He  has no past medical history on file  He   Patient Active Problem List    Diagnosis Date Noted    Internal hemorrhoids 65/73/7322    Umbilical bleeding 53/30/1724    Nausea 11/13/2018    Rectal pain 11/13/2018    Pharyngeal dysphagia 11/13/2018    History of rectal abscess 32/93/0981    Periumbilical pain, chronic 11/56/1303    Decreased appetite 11/13/2018    Other fatigue 11/13/2018    Migraine 11/13/2018    Toshia-rectal abscess 11/13/2018    Nausea and vomiting 11/13/2018    Dysphagia 11/13/2018    Rectal abscess 11/13/2018    Perirectal abscess 11/13/2018     He  has a past surgical history that includes Abscess drainage (10/29/2018)  His family history includes Diabetes in his father  He  reports that he has been smoking  He has never used smokeless tobacco  He reports that he does not drink alcohol or use drugs    Current Outpatient Prescriptions   Medication Sig Dispense Refill    ciprofloxacin (CIPRO) 500 mg tablet Take 1 tablet (500 mg total) by mouth every 12 (twelve) hours for 14 days 28 tablet 0    metroNIDAZOLE (FLAGYL) 500 mg tablet Take 1 tablet (500 mg total) by mouth every 12 (twelve) hours for 14 days 28 tablet 0    omeprazole (PriLOSEC) 20 mg delayed release capsule Take 1 capsule (20 mg total) by mouth 2 (two) times a day before meals for 30 days 60 capsule 0    ondansetron (ZOFRAN) 4 mg tablet Take 1 tablet (4 mg total) by mouth every 8 (eight) hours as needed for nausea or vomiting 20 tablet 0     No current facility-administered medications for this visit  No current outpatient prescriptions on file prior to visit  No current facility-administered medications on file prior to visit  He has No Known Allergies       Review of Systems   Constitutional: Positive for activity change, appetite change, chills and unexpected weight change  Negative for fatigue  HENT: Negative  Respiratory: Negative  Cardiovascular: Negative  Gastrointestinal: Positive for abdominal distention, abdominal pain, nausea, rectal pain and vomiting  Musculoskeletal: Positive for arthralgias  Skin: Negative  Neurological: Positive for headaches  Hematological: Negative  Psychiatric/Behavioral: Negative  Objective:      /60   Pulse 94   Resp 16   Ht 5' 11" (1 803 m)   Wt 91 2 kg (201 lb)   BMI 28 03 kg/m²          Physical Exam   Constitutional: He appears well-developed and well-nourished  Cardiovascular: Normal rate and regular rhythm  Pulmonary/Chest: Effort normal and breath sounds normal    Abdominal: Soft  Bowel sounds are normal  There is tenderness  Genitourinary:   Genitourinary Comments: Rectal tenderness with mild palpation of of left side buttock   Skin: There is pallor

## 2018-11-14 LAB
ENDOMYSIUM IGA SER QL: NEGATIVE
GLIADIN PEPTIDE IGA SER-ACNC: 6 UNITS (ref 0–19)
GLIADIN PEPTIDE IGG SER-ACNC: 4 UNITS (ref 0–19)
IGA SERPL-MCNC: 193 MG/DL (ref 90–386)
TTG IGA SER-ACNC: <2 U/ML (ref 0–3)
TTG IGG SER-ACNC: <2 U/ML (ref 0–5)

## 2018-11-15 LAB
BAKER'S YEAST IGG QN IA: 17 UNITS (ref 0–50)
CHITOBIOSIDE IGA SERPL IA-ACNC: 11 UNITS (ref 0–90)
IBD COMMENTS: NORMAL
LAMINARIBIOSIDE IGG SERPL IA-ACNC: 8 UNITS (ref 0–60)
MANNOBIOSIDE IGG SERPL IA-ACNC: 24 UNITS (ref 0–100)
P-ANCA ATYPICAL SER QL IF: NEGATIVE

## 2018-11-20 ENCOUNTER — ANESTHESIA EVENT (OUTPATIENT)
Dept: PERIOP | Facility: HOSPITAL | Age: 33
End: 2018-11-20
Payer: COMMERCIAL

## 2018-11-21 ENCOUNTER — ANESTHESIA (OUTPATIENT)
Dept: PERIOP | Facility: HOSPITAL | Age: 33
End: 2018-11-21
Payer: COMMERCIAL

## 2018-11-21 ENCOUNTER — HOSPITAL ENCOUNTER (OUTPATIENT)
Facility: HOSPITAL | Age: 33
Setting detail: OUTPATIENT SURGERY
Discharge: HOME | End: 2018-11-21
Attending: INTERNAL MEDICINE | Admitting: INTERNAL MEDICINE
Payer: COMMERCIAL

## 2018-11-21 VITALS
HEIGHT: 71 IN | DIASTOLIC BLOOD PRESSURE: 58 MMHG | SYSTOLIC BLOOD PRESSURE: 110 MMHG | WEIGHT: 196.21 LBS | BODY MASS INDEX: 27.47 KG/M2 | HEART RATE: 61 BPM | RESPIRATION RATE: 11 BRPM | OXYGEN SATURATION: 95 % | TEMPERATURE: 97.1 F

## 2018-11-21 DIAGNOSIS — R11.2 NAUSEA AND VOMITING, INTRACTABILITY OF VOMITING NOT SPECIFIED, UNSPECIFIED VOMITING TYPE: ICD-10-CM

## 2018-11-21 DIAGNOSIS — K61.1 RECTAL ABSCESS: ICD-10-CM

## 2018-11-21 DIAGNOSIS — R13.10 DYSPHAGIA, UNSPECIFIED TYPE: ICD-10-CM

## 2018-11-21 PROCEDURE — 88305 TISSUE EXAM BY PATHOLOGIST: CPT | Performed by: PATHOLOGY

## 2018-11-21 PROCEDURE — 45384 COLONOSCOPY W/LESION REMOVAL: CPT | Performed by: INTERNAL MEDICINE

## 2018-11-21 PROCEDURE — 43239 EGD BIOPSY SINGLE/MULTIPLE: CPT | Performed by: INTERNAL MEDICINE

## 2018-11-21 PROCEDURE — 88342 IMHCHEM/IMCYTCHM 1ST ANTB: CPT | Performed by: PATHOLOGY

## 2018-11-21 PROCEDURE — 88312 SPECIAL STAINS GROUP 1: CPT | Performed by: PATHOLOGY

## 2018-11-21 PROCEDURE — 43248 EGD GUIDE WIRE INSERTION: CPT | Performed by: INTERNAL MEDICINE

## 2018-11-21 RX ORDER — LIDOCAINE HYDROCHLORIDE 10 MG/ML
INJECTION, SOLUTION INFILTRATION; PERINEURAL AS NEEDED
Status: DISCONTINUED | OUTPATIENT
Start: 2018-11-21 | End: 2018-11-21 | Stop reason: SURG

## 2018-11-21 RX ORDER — SODIUM CHLORIDE, SODIUM LACTATE, POTASSIUM CHLORIDE, CALCIUM CHLORIDE 600; 310; 30; 20 MG/100ML; MG/100ML; MG/100ML; MG/100ML
125 INJECTION, SOLUTION INTRAVENOUS CONTINUOUS
Status: DISCONTINUED | OUTPATIENT
Start: 2018-11-21 | End: 2018-11-21 | Stop reason: HOSPADM

## 2018-11-21 RX ORDER — PROPOFOL 10 MG/ML
INJECTION, EMULSION INTRAVENOUS AS NEEDED
Status: DISCONTINUED | OUTPATIENT
Start: 2018-11-21 | End: 2018-11-21 | Stop reason: SURG

## 2018-11-21 RX ADMIN — SODIUM CHLORIDE, SODIUM LACTATE, POTASSIUM CHLORIDE, AND CALCIUM CHLORIDE: .6; .31; .03; .02 INJECTION, SOLUTION INTRAVENOUS at 09:17

## 2018-11-21 RX ADMIN — PROPOFOL 50 MG: 10 INJECTION, EMULSION INTRAVENOUS at 09:13

## 2018-11-21 RX ADMIN — PROPOFOL 50 MG: 10 INJECTION, EMULSION INTRAVENOUS at 09:09

## 2018-11-21 RX ADMIN — LIDOCAINE HYDROCHLORIDE 50 MG: 10 INJECTION, SOLUTION INFILTRATION; PERINEURAL at 09:00

## 2018-11-21 RX ADMIN — PROPOFOL 50 MG: 10 INJECTION, EMULSION INTRAVENOUS at 09:04

## 2018-11-21 RX ADMIN — SODIUM CHLORIDE, SODIUM LACTATE, POTASSIUM CHLORIDE, AND CALCIUM CHLORIDE 125 ML/HR: .6; .31; .03; .02 INJECTION, SOLUTION INTRAVENOUS at 08:32

## 2018-11-21 RX ADMIN — PROPOFOL 50 MG: 10 INJECTION, EMULSION INTRAVENOUS at 09:11

## 2018-11-21 RX ADMIN — PROPOFOL 50 MG: 10 INJECTION, EMULSION INTRAVENOUS at 09:03

## 2018-11-21 RX ADMIN — PROPOFOL 50 MG: 10 INJECTION, EMULSION INTRAVENOUS at 09:07

## 2018-11-21 RX ADMIN — PROPOFOL 100 MG: 10 INJECTION, EMULSION INTRAVENOUS at 09:05

## 2018-11-21 RX ADMIN — PROPOFOL 50 MG: 10 INJECTION, EMULSION INTRAVENOUS at 09:02

## 2018-11-21 RX ADMIN — PROPOFOL 50 MG: 10 INJECTION, EMULSION INTRAVENOUS at 09:16

## 2018-11-21 RX ADMIN — PROPOFOL 100 MG: 10 INJECTION, EMULSION INTRAVENOUS at 09:00

## 2018-11-21 NOTE — OP NOTE
Postoperative Note  PATIENT NAME: Rehan Milan  : 1985  MRN: [id number]  [facility]    Surgery Date: 2018    POST-OP DIAGNOSIS: See the impression below    SEDATION: Monitored anesthesia care, check anesthesia records    PHYSICAL EXAM:  Vitals:    18 0822   Pulse: 65   Resp: 16   Temp: 98 2 °F (36 8 °C)   SpO2: 99%     Body mass index is 27 37 kg/m²  General: NAD  Heart: S1 & S2 normal, RRR  Lungs: CTA, No rales or rhonchi  Abdomen: Soft, nontender, nondistended, good bowel sounds    CONSENT:  Informed consent was obtained for the procedure, including sedation after explaining the risks and benefits of the procedure  Risks including but not limited to bleeding, perforation, infection, aspiration were discussed in detail  Also explained about less than 100%$ sensitivity with the exam and other alternatives  DESCRIPTION:   Procedure:  Esophagogastroduodenoscopy with biopsy and Savary guidewire dilatation    Indications:  56-year-old male with dysphagia and midepigastric discomfort    ASA 2    Estimated blood loss: Insignificant    Premedicated with mac anesthesia    Patient is identified by me  He is examined prior to the procedure found to be in stable condition  He is attached to the cardiac monitor and pulse oximeter and placed in left lateral position  After adequate intravenous sedation the Olympus video gastroscope was inserted under direct vision into the esophagus  At the 20 cm kary in the esophagus there are several small umbilicated nodules measuring 2-3 mm  Biopsies taken with excellent hemostasis  The esophagus is then unremarkable until the distal esophagus is reach  At the GE junction there is LA class D ulcerative esophagitis with stricture  Biopsies taken with excellent hemostasis  We traversed the stricture without difficulty  The stomach is entered    The body and antrum normal   The pylorus is normal   The duodenum was cannulated into the 3rd portion and is normal   Retroversion reveals a normal GE junction fundus and cardia  Biopsies taken of the antrum body and fundus with excellent hemostasis  A guidewire is then placed and the scope removed  A single dilatation with a 54 Polish Savary dilators performed  The dilator and guidewire are removed in tandem  Patient tolerated the procedure well and was stable throughout  My impression:  1  Proximal esophageal nodularity, status post biopsy  2  Severe ulcerative esophagitis, status post biopsy  3  Esophageal stricture status post Savary guidewire dilatation  4  Status post gastric biopsies    RECOMMENDATIONS:  Begin proton pump inhibitors as prescribed  Follow up biopsy results in 1 week  Follow up with primary care physician for smoking cessation    COMPLICATIONS:  None; patient tolerated the procedure well  DISPOSITION: PACU  CONDITION: Stable    Kim Dillard MD  11/21/2018,9:08 AM        Portions of the record may have been created with voice recognition software   Occasional wrong word or "sound a like" substitutions may have occurred due to the inherent limitations of voice recognition software   Read the chart carefully and recognize, using context, where substitutions have occurred

## 2018-11-21 NOTE — ANESTHESIA PREPROCEDURE EVALUATION
Review of Systems/Medical History  Patient summary reviewed  Chart reviewed      Cardiovascular   Pulmonary  Smoker cigarette smoker  , Tobacco cessation counseling given ,        GI/Hepatic            Endo/Other     GYN       Hematology   Musculoskeletal       Neurology    Headaches,    Psychology           Physical Exam    Airway    Mallampati score: III  TM Distance: <3 FB  Neck ROM: full     Dental       Cardiovascular  Rhythm: regular, Rate: normal,     Pulmonary  Breath sounds clear to auscultation,     Other Findings  Very poor dentition      Anesthesia Plan  ASA Score- 2     Anesthesia Type-     Plan Factors-    Induction- intravenous  Postoperative Plan- Plan for postoperative opioid use  Informed Consent- Anesthetic plan and risks discussed with patient  I personally reviewed this patient with the CRNA  Discussed and agreed on the Anesthesia Plan with the CRNA  Charlie Gutierres

## 2018-11-21 NOTE — ANESTHESIA POSTPROCEDURE EVALUATION
Post-Op Assessment Note      CV Status:  Stable    Mental Status:  Somnolent    Hydration Status:  Stable    PONV Controlled:  None    Airway Patency:  Patent    Post Op Vitals Reviewed: Yes          Staff: CRNA           BP   95/48   Temp      Pulse  79   Resp   25   SpO2   96

## 2018-11-21 NOTE — DISCHARGE INSTRUCTIONS
Upper Endoscopy   WHAT YOU NEED TO KNOW:   An upper endoscopy is also called an upper gastrointestinal (GI) endoscopy, or an esophagogastroduodenoscopy (EGD)  You may feel bloated, gassy, or have some abdominal discomfort after your procedure  Your throat may be sore for 24 to 36 hours  You may burp or pass gas from air that is still inside your body  DISCHARGE INSTRUCTIONS:   Call 911 for any of the following:   · You have sudden chest pain or trouble breathing  Seek care immediately if:   · You feel dizzy or faint  · You have trouble swallowing  · Your bowel movements are very dark or black  · Your abdomen is hard and firm and you have severe pain  · You vomit blood  Contact your healthcare provider if:   · You feel full or bloated and cannot burp or pass gas  · You have not had a bowel movement for 3 days after your procedure  · You have neck pain  · You have a fever or chills  · You have nausea or are vomiting  · You have a rash or hives  · You have questions or concerns about your endoscopy  Relieve a sore throat:  Suck on throat lozenges or crushed ice  Gargle with a small amount of warm salt water  Mix 1 teaspoon of salt and 1 cup of warm water to make salt water  Relieve gas and discomfort from bloating:  Lie on your right side with a heating pad on your abdomen  Take short walks to help pass gas  Eat small meals until bloating is relieved  Rest after your procedure: You have been given medicine to relax you  Do not  drive or make important decisions until the day after your procedure  Return to your normal activity as directed  You can usually return to work the day after your procedure  Follow up with your healthcare provider as directed:  Write down your questions so you remember to ask them during your visits     © 2017 [address] is for End User's use only and may not be sold, redistributed or otherwise used for commercial purposes  All illustrations and images included in CareNotes® are the copyrighted property of [employer]  or [employer]  The above information is an  only  It is not intended as medical advice for individual conditions or treatments  Talk to your doctor, nurse or pharmacist before following any medical regimen to see if it is safe and effective for you  Upper Endoscopy   WHAT YOU NEED TO KNOW:   An upper endoscopy is also called an upper gastrointestinal (GI) endoscopy, or an esophagogastroduodenoscopy (EGD)  You may feel bloated, gassy, or have some abdominal discomfort after your procedure  Your throat may be sore for 24 to 36 hours  You may burp or pass gas from air that is still inside your body  DISCHARGE INSTRUCTIONS:   Call 911 for any of the following:   · You have sudden chest pain or trouble breathing  Seek care immediately if:   · You feel dizzy or faint  · You have trouble swallowing  · Your bowel movements are very dark or black  · Your abdomen is hard and firm and you have severe pain  · You vomit blood  Contact your healthcare provider if:   · You feel full or bloated and cannot burp or pass gas  · You have not had a bowel movement for 3 days after your procedure  · You have neck pain  · You have a fever or chills  · You have nausea or are vomiting  · You have a rash or hives  · You have questions or concerns about your endoscopy  Relieve a sore throat:  Suck on throat lozenges or crushed ice  Gargle with a small amount of warm salt water  Mix 1 teaspoon of salt and 1 cup of warm water to make salt water  Relieve gas and discomfort from bloating:  Lie on your right side with a heating pad on your abdomen  Take short walks to help pass gas  Eat small meals until bloating is relieved  Rest after your procedure: You have been given medicine to relax you   Do not  drive or make important decisions until the day after your procedure  Return to your normal activity as directed  You can usually return to work the day after your procedure  Follow up with your healthcare provider as directed:  Write down your questions so you remember to ask them during your visits  © 2017 [address] is for End User's use only and may not be sold, redistributed or otherwise used for commercial purposes  All illustrations and images included in CareNotes® are the copyrighted property of [employer]  or [employer]  The above information is an  only  It is not intended as medical advice for individual conditions or treatments  Talk to your doctor, nurse or pharmacist before following any medical regimen to see if it is safe and effective for you

## 2018-11-21 NOTE — DISCHARGE INSTR - AVS FIRST PAGE
Postoperative Note  PATIENT NAME: Pee Rudd  : 1985  MRN: [id number]  [facility]    Surgery Date: 2018    POST-OP DIAGNOSIS: See the impression below    SEDATION: Monitored anesthesia care, check anesthesia records    PHYSICAL EXAM:  Vitals:    18 0822   Pulse: 65   Resp: 16   Temp: 98 2 °F (36 8 °C)   SpO2: 99%     Body mass index is 27 37 kg/m²  General: NAD  Heart: S1 & S2 normal, RRR  Lungs: CTA, No rales or rhonchi  Abdomen: Soft, nontender, nondistended, good bowel sounds    CONSENT:  Informed consent was obtained for the procedure, including sedation after explaining the risks and benefits of the procedure  Risks including but not limited to bleeding, perforation, infection, aspiration were discussed in detail  Also explained about less than 100%$ sensitivity with the exam and other alternatives  DESCRIPTION:   Procedure:  Esophagogastroduodenoscopy with biopsy and Savary guidewire dilatation    Indications:  72-year-old male with dysphagia and midepigastric discomfort    ASA 2    Estimated blood loss: Insignificant    Premedicated with mac anesthesia    Patient is identified by me  He is examined prior to the procedure found to be in stable condition  He is attached to the cardiac monitor and pulse oximeter and placed in left lateral position  After adequate intravenous sedation the Olympus video gastroscope was inserted under direct vision into the esophagus  At the 20 cm kary in the esophagus there are several small umbilicated nodules measuring 2-3 mm  Biopsies taken with excellent hemostasis  The esophagus is then unremarkable until the distal esophagus is reach  At the GE junction there is LA class D ulcerative esophagitis with stricture  Biopsies taken with excellent hemostasis  We traversed the stricture without difficulty  The stomach is entered    The body and antrum normal   The pylorus is normal   The duodenum was cannulated into the 3rd portion and is normal   Retroversion reveals a normal GE junction fundus and cardia  Biopsies taken of the antrum body and fundus with excellent hemostasis  A guidewire is then placed and the scope removed  A single dilatation with a 54 K[place] Savary dilators performed  The dilator and guidewire are removed in tandem  Patient tolerated the procedure well and was stable throughout  My impression:  1  Proximal esophageal nodularity, status post biopsy  2  Severe ulcerative esophagitis, status post biopsy  3  Esophageal stricture status post Savary guidewire dilatation  4  Status post gastric biopsies    RECOMMENDATIONS:  Begin proton pump inhibitors as prescribed  Follow up biopsy results in 1 week  Follow up with primary care physician for smoking cessation    COMPLICATIONS:  None; patient tolerated the procedure well  DISPOSITION: PACU  CONDITION: Stable    Jesus Manuel Gonzales MD  2018,9:08 AM        Portions of the record may have been created with voice recognition software   Occasional wrong word or "sound a like" substitutions may have occurred due to the inherent limitations of voice recognition software   Read the chart carefully and recognize, using context, where substitutions have occurred  Postoperative Note  PATIENT NAME: Liat Payne  : 1985  MRN: [id number]  [facility]    Surgery Date: 2018    POST-OP DIAGNOSIS: See the impression below    SEDATION: Monitored anesthesia care, check anesthesia records    PHYSICAL EXAM:  Vitals:    18 0822   Pulse: 65   Resp: 16   Temp: 98 2 °F (36 8 °C)   SpO2: 99%     Body mass index is 27 37 kg/m²  General: NAD  Heart: S1 & S2 normal, RRR  Lungs: CTA, No rales or rhonchi  Abdomen: Soft, nontender, nondistended, good bowel sounds    CONSENT:  Informed consent was obtained for the procedure, including sedation after explaining the risks and benefits of the procedure   Risks including but not limited to bleeding, perforation, infection, aspiration were discussed in detail  Also explained about less than 100%$ sensitivity with the exam and other alternatives  DESCRIPTION:   Procedure:  Fiberoptic colonoscopy with polyp removal by hot biopsy forcep    Indications:  23-year-old male with rectal pain, weight loss, rectal abscess    ASA 2    Estimated blood loss:  None    Premedicated with mac anesthesia    Patient is identified by me  He is examined prior to the procedure and found to be in stable condition  He is attached to cardiac monitor and pulse oximeter and placed in left lateral position  After adequate intravenous sedation the Olympus video colonoscope was inserted and passed easily to the cecum  The ileocecal valve and the appendiceal orifice are identified  The valve was cannulated and the terminal ileum is examined for approximately 20 cm  The scope was then slowly withdrawn with excellent circumferential visualization of the mucosa  The preparation is excellent  Terminal ileum is completely unremarkable  In the colon at 20 cm there is a single diminutive polyp removed with hot biopsy technique with excellent uriel  The polyp was retrieved and sent to pathology  The colon is otherwise unremarkable  Retroversion is completely normal   Patient tolerated the procedure well and was stable throughout  My impression:  Diminutive sigmoid polyp, status post hot biopsy removal in an otherwise normal colon and terminal ileum    RECOMMENDATIONS:  Follow up biopsy results in 1 week  Follow-up colonoscopy in 5 years  COMPLICATIONS:  None; patient tolerated the procedure well    DISPOSITION: PACU  CONDITION: Stable    Esdras Thurman MD  11/21/2018,9:18 AM        Portions of the record may have been created with voice recognition software   Occasional wrong word or "sound a like" substitutions may have occurred due to the inherent limitations of voice recognition software   Read the chart carefully and recognize, using context, where substitutions have occurred

## 2018-11-21 NOTE — OP NOTE
Postoperative Note  PATIENT NAME: Jackie Mars  : 1985  MRN: [id number]  [facility]    Surgery Date: 2018    POST-OP DIAGNOSIS: See the impression below    SEDATION: Monitored anesthesia care, check anesthesia records    PHYSICAL EXAM:  Vitals:    18 0822   Pulse: 65   Resp: 16   Temp: 98 2 °F (36 8 °C)   SpO2: 99%     Body mass index is 27 37 kg/m²  General: NAD  Heart: S1 & S2 normal, RRR  Lungs: CTA, No rales or rhonchi  Abdomen: Soft, nontender, nondistended, good bowel sounds    CONSENT:  Informed consent was obtained for the procedure, including sedation after explaining the risks and benefits of the procedure  Risks including but not limited to bleeding, perforation, infection, aspiration were discussed in detail  Also explained about less than 100%$ sensitivity with the exam and other alternatives  DESCRIPTION:   Procedure:  Fiberoptic colonoscopy with polyp removal by hot biopsy forcep    Indications:  80-year-old male with rectal pain, weight loss, rectal abscess    ASA 2    Estimated blood loss:  None    Premedicated with mac anesthesia    Patient is identified by me  He is examined prior to the procedure and found to be in stable condition  He is attached to cardiac monitor and pulse oximeter and placed in left lateral position  After adequate intravenous sedation the Olympus video colonoscope was inserted and passed easily to the cecum  The ileocecal valve and the appendiceal orifice are identified  The valve was cannulated and the terminal ileum is examined for approximately 20 cm  The scope was then slowly withdrawn with excellent circumferential visualization of the mucosa  The preparation is excellent  Terminal ileum is completely unremarkable  In the colon at 20 cm there is a single diminutive polyp removed with hot biopsy technique with excellent uriel  The polyp was retrieved and sent to pathology  The colon is otherwise unremarkable    Retroversion is completely normal   Patient tolerated the procedure well and was stable throughout  My impression:  Diminutive sigmoid polyp, status post hot biopsy removal in an otherwise normal colon and terminal ileum    RECOMMENDATIONS:  Follow up biopsy results in 1 week  Follow-up colonoscopy in 5 years  COMPLICATIONS:  None; patient tolerated the procedure well  DISPOSITION: PACU  CONDITION: Stable    Attila Hays MD  11/21/2018,9:18 AM        Portions of the record may have been created with voice recognition software   Occasional wrong word or "sound a like" substitutions may have occurred due to the inherent limitations of voice recognition software   Read the chart carefully and recognize, using context, where substitutions have occurred

## 2018-11-28 ENCOUNTER — TELEPHONE (OUTPATIENT)
Dept: GASTROENTEROLOGY | Facility: CLINIC | Age: 33
End: 2018-11-28

## 2018-12-11 ENCOUNTER — TELEPHONE (OUTPATIENT)
Dept: GASTROENTEROLOGY | Facility: CLINIC | Age: 33
End: 2018-12-11

## 2019-01-16 ENCOUNTER — HOSPITAL ENCOUNTER (EMERGENCY)
Facility: HOSPITAL | Age: 34
Discharge: HOME | End: 2019-01-17
Attending: EMERGENCY MEDICINE | Admitting: EMERGENCY MEDICINE
Payer: COMMERCIAL

## 2019-01-16 DIAGNOSIS — L03.211 FACIAL CELLULITIS: ICD-10-CM

## 2019-01-16 DIAGNOSIS — K04.7 PERIAPICAL ABSCESS: Primary | ICD-10-CM

## 2019-01-16 LAB
ANION GAP SERPL CALCULATED.3IONS-SCNC: 7 MMOL/L (ref 4–13)
BASOPHILS # BLD AUTO: 0.05 THOUSANDS/ΜL (ref 0–0.1)
BASOPHILS NFR BLD AUTO: 0 % (ref 0–1)
BUN SERPL-MCNC: 11 MG/DL (ref 5–25)
CALCIUM SERPL-MCNC: 9.4 MG/DL (ref 8.3–10.1)
CHLORIDE SERPL-SCNC: 104 MMOL/L (ref 100–108)
CO2 SERPL-SCNC: 30 MMOL/L (ref 21–32)
CREAT SERPL-MCNC: 0.83 MG/DL (ref 0.6–1.3)
EOSINOPHIL # BLD AUTO: 0.3 THOUSAND/ΜL (ref 0–0.61)
EOSINOPHIL NFR BLD AUTO: 2 % (ref 0–6)
ERYTHROCYTE [DISTWIDTH] IN BLOOD BY AUTOMATED COUNT: 12.8 % (ref 11.6–15.1)
GFR SERPL CREATININE-BSD FRML MDRD: 116 ML/MIN/1.73SQ M
GLUCOSE SERPL-MCNC: 98 MG/DL (ref 65–140)
HCT VFR BLD AUTO: 42.7 % (ref 36.5–49.3)
HGB BLD-MCNC: 14.6 G/DL (ref 12–17)
IMM GRANULOCYTES # BLD AUTO: 0.05 THOUSAND/UL (ref 0–0.2)
IMM GRANULOCYTES NFR BLD AUTO: 0 % (ref 0–2)
LYMPHOCYTES # BLD AUTO: 1.76 THOUSANDS/ΜL (ref 0.6–4.47)
LYMPHOCYTES NFR BLD AUTO: 12 % (ref 14–44)
MCH RBC QN AUTO: 32.1 PG (ref 26.8–34.3)
MCHC RBC AUTO-ENTMCNC: 34.2 G/DL (ref 31.4–37.4)
MCV RBC AUTO: 94 FL (ref 82–98)
MONOCYTES # BLD AUTO: 1.18 THOUSAND/ΜL (ref 0.17–1.22)
MONOCYTES NFR BLD AUTO: 8 % (ref 4–12)
NEUTROPHILS # BLD AUTO: 11.16 THOUSANDS/ΜL (ref 1.85–7.62)
NEUTS SEG NFR BLD AUTO: 78 % (ref 43–75)
NRBC BLD AUTO-RTO: 0 /100 WBCS
PLATELET # BLD AUTO: 269 THOUSANDS/UL (ref 149–390)
PMV BLD AUTO: 9.2 FL (ref 8.9–12.7)
POTASSIUM SERPL-SCNC: 4.1 MMOL/L (ref 3.5–5.3)
RBC # BLD AUTO: 4.55 MILLION/UL (ref 3.88–5.62)
SODIUM SERPL-SCNC: 141 MMOL/L (ref 136–145)
WBC # BLD AUTO: 14.5 THOUSAND/UL (ref 4.31–10.16)

## 2019-01-16 PROCEDURE — 80048 BASIC METABOLIC PNL TOTAL CA: CPT | Performed by: EMERGENCY MEDICINE

## 2019-01-16 PROCEDURE — 99284 EMERGENCY DEPT VISIT MOD MDM: CPT

## 2019-01-16 PROCEDURE — 96375 TX/PRO/DX INJ NEW DRUG ADDON: CPT

## 2019-01-16 PROCEDURE — 85025 COMPLETE CBC W/AUTO DIFF WBC: CPT | Performed by: EMERGENCY MEDICINE

## 2019-01-16 PROCEDURE — 36415 COLL VENOUS BLD VENIPUNCTURE: CPT | Performed by: EMERGENCY MEDICINE

## 2019-01-16 PROCEDURE — 96365 THER/PROPH/DIAG IV INF INIT: CPT

## 2019-01-16 RX ORDER — KETOROLAC TROMETHAMINE 30 MG/ML
15 INJECTION, SOLUTION INTRAMUSCULAR; INTRAVENOUS ONCE
Status: COMPLETED | OUTPATIENT
Start: 2019-01-16 | End: 2019-01-16

## 2019-01-16 RX ORDER — ONDANSETRON 2 MG/ML
4 INJECTION INTRAMUSCULAR; INTRAVENOUS ONCE
Status: COMPLETED | OUTPATIENT
Start: 2019-01-16 | End: 2019-01-16

## 2019-01-16 RX ORDER — CLINDAMYCIN PHOSPHATE 600 MG/50ML
600 INJECTION INTRAVENOUS ONCE
Status: COMPLETED | OUTPATIENT
Start: 2019-01-16 | End: 2019-01-17

## 2019-01-16 RX ORDER — DEXAMETHASONE SODIUM PHOSPHATE 4 MG/ML
10 INJECTION, SOLUTION INTRA-ARTICULAR; INTRALESIONAL; INTRAMUSCULAR; INTRAVENOUS; SOFT TISSUE ONCE
Status: COMPLETED | OUTPATIENT
Start: 2019-01-16 | End: 2019-01-16

## 2019-01-16 RX ORDER — HYDROMORPHONE HCL/PF 1 MG/ML
0.5 SYRINGE (ML) INJECTION ONCE
Status: COMPLETED | OUTPATIENT
Start: 2019-01-16 | End: 2019-01-16

## 2019-01-16 RX ADMIN — ONDANSETRON 4 MG: 2 INJECTION INTRAMUSCULAR; INTRAVENOUS at 23:35

## 2019-01-16 RX ADMIN — HYDROMORPHONE HYDROCHLORIDE 0.5 MG: 1 INJECTION, SOLUTION INTRAMUSCULAR; INTRAVENOUS; SUBCUTANEOUS at 23:36

## 2019-01-16 RX ADMIN — CLINDAMYCIN PHOSPHATE 600 MG: 600 INJECTION, SOLUTION INTRAVENOUS at 23:36

## 2019-01-16 RX ADMIN — KETOROLAC TROMETHAMINE 15 MG: 30 INJECTION, SOLUTION INTRAMUSCULAR at 23:35

## 2019-01-16 RX ADMIN — SODIUM CHLORIDE 1000 ML: 0.9 INJECTION, SOLUTION INTRAVENOUS at 23:34

## 2019-01-16 RX ADMIN — DEXAMETHASONE SODIUM PHOSPHATE 10 MG: 4 INJECTION, SOLUTION INTRAMUSCULAR; INTRAVENOUS at 23:35

## 2019-01-17 ENCOUNTER — APPOINTMENT (EMERGENCY)
Dept: CT IMAGING | Facility: HOSPITAL | Age: 34
End: 2019-01-17
Payer: COMMERCIAL

## 2019-01-17 VITALS
OXYGEN SATURATION: 98 % | HEIGHT: 71 IN | TEMPERATURE: 98.1 F | DIASTOLIC BLOOD PRESSURE: 54 MMHG | SYSTOLIC BLOOD PRESSURE: 110 MMHG | RESPIRATION RATE: 20 BRPM | HEART RATE: 74 BPM | BODY MASS INDEX: 28.24 KG/M2 | WEIGHT: 201.72 LBS

## 2019-01-17 PROCEDURE — 70491 CT SOFT TISSUE NECK W/DYE: CPT

## 2019-01-17 RX ORDER — ONDANSETRON 4 MG/1
4 TABLET, ORALLY DISINTEGRATING ORAL EVERY 8 HOURS PRN
Qty: 12 TABLET | Refills: 0 | Status: SHIPPED | OUTPATIENT
Start: 2019-01-17 | End: 2019-03-14 | Stop reason: HOSPADM

## 2019-01-17 RX ORDER — HYDROCODONE BITARTRATE AND ACETAMINOPHEN 5; 325 MG/1; MG/1
1 TABLET ORAL EVERY 6 HOURS PRN
Qty: 10 TABLET | Refills: 0 | Status: SHIPPED | OUTPATIENT
Start: 2019-01-17 | End: 2019-01-27

## 2019-01-17 RX ORDER — NAPROXEN 500 MG/1
500 TABLET ORAL 2 TIMES DAILY PRN
Qty: 14 TABLET | Refills: 0 | Status: SHIPPED | OUTPATIENT
Start: 2019-01-17 | End: 2019-03-12

## 2019-01-17 RX ADMIN — IOHEXOL 89 ML: 350 INJECTION, SOLUTION INTRAVENOUS at 00:22

## 2019-01-17 NOTE — DISCHARGE INSTRUCTIONS
Dental Abscess   WHAT YOU NEED TO KNOW:   A dental abscess is a collection of pus in or around a tooth  A dental abscess is caused by bacteria  The bacteria usually enter the tooth when the enamel (outer part of the tooth) is damaged by tooth decay  Bacteria may also enter the tooth through a break or chip in the tooth, or a cut in the gum  Food particles that are stuck between the teeth for a long time may also lead to an abscess  DISCHARGE INSTRUCTIONS:   Return to the emergency department if:   · You have severe pain  · You have trouble breathing because of pain or swelling  Contact your healthcare provider if:   · Your symptoms get worse, even after treatment  · Your mouth is bleeding  · You cannot eat or drink because of pain or swelling  · Your abscess returns  · You have an injury that causes a crack in your tooth  · You have questions or concerns about your condition or care  Medicines: You may  need any of the following:  · Antibiotics  help treat a bacterial infection  · NSAIDs , such as ibuprofen, help decrease swelling, pain, and fever  This medicine is available with or without a doctor's order  NSAIDs can cause stomach bleeding or kidney problems in certain people  If you take blood thinner medicine, always ask your healthcare provider if NSAIDs are safe for you  Always read the medicine label and follow directions  · Acetaminophen  decreases pain and fever  It is available without a doctor's order  Ask how much to take and how often to take it  Follow directions  Read the labels of all other medicines you are using to see if they also contain acetaminophen, or ask your doctor or pharmacist  Acetaminophen can cause liver damage if not taken correctly  Do not use more than 4 grams (4,000 milligrams) total of acetaminophen in one day  · Prescription pain medicine  may be given  Ask your healthcare provider how to take this medicine safely   Some prescription pain medicines contain acetaminophen  Do not take other medicines that contain acetaminophen without talking to your healthcare provider  Too much acetaminophen may cause liver damage  Prescription pain medicine may cause constipation  Ask your healthcare provider how to prevent or treat constipation  · Take your medicine as directed  Contact your healthcare provider if you think your medicine is not helping or if you have side effects  Tell him of her if you are allergic to any medicine  Keep a list of the medicines, vitamins, and herbs you take  Include the amounts, and when and why you take them  Bring the list or the pill bottles to follow-up visits  Carry your medicine list with you in case of an emergency  Self-care:   · Rinse your mouth every 2 hours with salt water  This will help keep the area clean  · Gently brush your teeth twice a day with a soft tooth brush  This will help keep the area clean  · Eat soft foods as directed  Soft foods may cause less pain  Examples include applesauce, yogurt, and cooked pasta  Ask your healthcare provider how long to follow this instruction  · Apply a warm compress to your tooth or gum  Use a cotton ball or gauze soaked in warm water  Remove the compress in 10 minutes or when it becomes cool  Repeat 3 times a day  Prevent another abscess:   · Brush your teeth at least 2 times a day with fluoride toothpaste  · Use dental floss to clean between your teeth at least once a day  · Rinse your mouth with water or mouthwash after meals and snacks  · Chew sugarless gum after meals and snacks  · Limit foods that are sticky and high in sugar such as raisons  Also limit drinks high in sugar, such as soda  · See your dentist every 6 months for dental cleanings and oral exams  Follow up with your healthcare provider in 24 hours: Your healthcare provider will need to check your teeth and gums   Write down your questions so you remember to ask them during your visits  © 2017 [employer] Information is for End User's use only and may not be sold, redistributed or otherwise used for commercial purposes  All illustrations and images included in CareNotes® are the copyrighted property of [employer]  or [employer]  The above information is an  only  It is not intended as medical advice for individual conditions or treatments  Talk to your doctor, nurse or pharmacist before following any medical regimen to see if it is safe and effective for you

## 2019-01-20 NOTE — ED PROVIDER NOTES
History  Chief Complaint   Patient presents with    Oral Swelling     pt started with left sided facial swelling  saw dentist yesterday, dx with infection and given abx  pt states pain and swelling has gotten worse, unable to swallow  co fevers  pt very tearful in triage  pt denies difficulty breathing  pt also c/o vomiting     HPI   27-year-old pleasant male presents to the emergency department with left-sided dental pain and facial swelling  Patient states that he has had chronic dental problems, but that yesterday he began noting acute on chronic left lower dental pain with mild facial swelling  He was able to see a dentist who prescribed him clindamycin and told him to follow up with Oral surgery  He was scheduled an appointment with the oral surgeon for next week, but presents today with acute worsening pain and swelling  Patient states that he has had nausea and decreased appetite due to pain and that he has not had begun the antibiotics due to nausea  He has not noted any modifying factors for his symptoms and denies having had similar symptoms in the past  On ROS, patient complains of subjective fevers  He denies any difficulty speaking, breathing, or complaints other than stated above  Prior to Admission Medications   Prescriptions Last Dose Informant Patient Reported? Taking?   omeprazole (PriLOSEC) 20 mg delayed release capsule   No No   Sig: Take 1 capsule (20 mg total) by mouth 2 (two) times a day before meals for 30 days   ondansetron (ZOFRAN) 4 mg tablet   No No   Sig: Take 1 tablet (4 mg total) by mouth every 8 (eight) hours as needed for nausea or vomiting      Facility-Administered Medications: None       History reviewed  No pertinent past medical history      Past Surgical History:   Procedure Laterality Date    ABCESS DRAINAGE  10/29/2018    carlos anal    [facility] COLONOSCOPY FLX DX W/COLLJ SPEC WHEN PFRMD N/A 11/21/2018    Procedure: COLONOSCOPY;  Surgeon: Attila Hays MD;  Location: [facility]; Service: Gastroenterology    [facility] ESOPHAGOGASTRODUODENOSCOPY TRANSORAL DIAGNOSTIC N/A 11/21/2018    Procedure: ESOPHAGOGASTRODUODENOSCOPY (EGD); Surgeon: Esdras Thurman MD;  Location: [facility]; Service: Gastroenterology       Family History   Problem Relation Age of Onset    Diabetes Father      I have reviewed and agree with the history as documented  Social History   Substance Use Topics    Smoking status: Current Every Day Smoker     Packs/day: 1 00    Smokeless tobacco: Never Used    Alcohol use No        Review of Systems   Constitutional: Positive for appetite change and fever  Negative for chills  HENT: Positive for dental problem and facial swelling  Negative for trouble swallowing  Respiratory: Negative for shortness of breath  Cardiovascular: Negative for chest pain  Gastrointestinal: Positive for nausea  Negative for abdominal pain and vomiting  Musculoskeletal: Negative for back pain  Skin: Negative for rash and wound  Allergic/Immunologic: Negative for immunocompromised state  Neurological: Negative for headaches  Psychiatric/Behavioral: The patient is not nervous/anxious  All other systems reviewed and are negative  Physical Exam  Physical Exam   Constitutional: He is oriented to person, place, and time  He appears well-nourished  No distress  HENT:   Head: Normocephalic and atraumatic  Mouth/Throat: Abnormal dentition  Dental abscesses and dental caries present  Eyes: EOM are normal    Neck: Normal range of motion  Neck supple  Cardiovascular: Normal rate and regular rhythm  Pulmonary/Chest: Effort normal and breath sounds normal  No respiratory distress  Abdominal: Soft  He exhibits no distension  There is no tenderness  Musculoskeletal: Normal range of motion  Neurological: He is alert and oriented to person, place, and time  Skin: Skin is warm and dry  He is not diaphoretic     Psychiatric: He has a normal mood and affect  His behavior is normal    Nursing note and vitals reviewed  Vital Signs  ED Triage Vitals   Temperature Pulse Respirations Blood Pressure SpO2   01/16/19 2247 01/16/19 2240 01/16/19 2240 01/16/19 2240 01/16/19 2240   98 1 °F (36 7 °C) 95 22 144/68 98 %      Temp Source Heart Rate Source Patient Position - Orthostatic VS BP Location FiO2 (%)   01/16/19 2247 01/17/19 0107 01/17/19 0107 01/17/19 0107 --   Oral Monitor Lying Left arm       Pain Score       01/16/19 2240       9           Vitals:    01/16/19 2240 01/17/19 0107   BP: 144/68 110/54   Pulse: 95 74   Patient Position - Orthostatic VS:  Lying       Visual Acuity      ED Medications  Medications   clindamycin (CLEOCIN) IVPB (premix) 600 mg (0 mg Intravenous Stopped 1/17/19 0006)   dexamethasone (DECADRON) injection 10 mg (10 mg Intravenous Given 1/16/19 2335)   ketorolac (TORADOL) injection 15 mg (15 mg Intravenous Given 1/16/19 2335)   HYDROmorphone (DILAUDID) injection 0 5 mg (0 5 mg Intravenous Given 1/16/19 2336)   ondansetron (ZOFRAN) injection 4 mg (4 mg Intravenous Given 1/16/19 2335)   sodium chloride 0 9 % bolus 1,000 mL (0 mL Intravenous Stopped 1/17/19 0034)   iohexol (OMNIPAQUE) 350 MG/ML injection (MULTI-DOSE) 100 mL (89 mL Intravenous Given 1/17/19 0022)       Diagnostic Studies  Results Reviewed     Procedure Component Value Units Date/Time    Basic metabolic panel [[id number]] Collected:  01/16/19 2341    Lab Status:  Final result Specimen:  Blood from Arm, Right Updated:  01/16/19 2356     Sodium 141 mmol/L      Potassium 4 1 mmol/L      Chloride 104 mmol/L      CO2 30 mmol/L      ANION GAP 7 mmol/L      BUN 11 mg/dL      Creatinine 0 83 mg/dL      Glucose 98 mg/dL      Calcium 9 4 mg/dL      eGFR 116 ml/min/1 73sq m     Narrative:         National Kidney Disease Education Program recommendations are as follows:  GFR calculation is accurate only with a steady state creatinine  Chronic Kidney disease less than 60 ml/min/1 73 sq  meters  Kidney failure less than 15 ml/min/1 73 sq  meters  CBC and differential [[id number]]  (Abnormal) Collected:  01/16/19 2341    Lab Status:  Final result Specimen:  Blood from Arm, Right Updated:  01/16/19 2346     WBC 14 50 (H) Thousand/uL      RBC 4 55 Million/uL      Hemoglobin 14 6 g/dL      Hematocrit 42 7 %      MCV 94 fL      MCH 32 1 pg      MCHC 34 2 g/dL      RDW 12 8 %      MPV 9 2 fL      Platelets 117 Thousands/uL      nRBC 0 /100 WBCs      Neutrophils Relative 78 (H) %      Immat GRANS % 0 %      Lymphocytes Relative 12 (L) %      Monocytes Relative 8 %      Eosinophils Relative 2 %      Basophils Relative 0 %      Neutrophils Absolute 11 16 (H) Thousands/µL      Immature Grans Absolute 0 05 Thousand/uL      Lymphocytes Absolute 1 76 Thousands/µL      Monocytes Absolute 1 18 Thousand/µL      Eosinophils Absolute 0 30 Thousand/µL      Basophils Absolute 0 05 Thousands/µL                  CT soft tissue neck with contrast   Final Result by Idalia Hatfield MD (01/17 0104)      Extensive left facial cellulitis without evidence of soft tissue or subperiosteal abscess  2   Subtle lucency surrounding the roots of the left mandibular 2nd premolar may represent a periapical abscess and possible odontogenic source of infection  Workstation performed: [id number]                    Procedures  Procedures       Phone Contacts  ED Phone Contact    ED Course                               MDM  Number of Diagnoses or Management Options  Facial cellulitis:   Periapical abscess:   Diagnosis management comments: Following result of CT, patient agrees to begin PO abx at discharge, return to the ED for any new or worsening symptoms       CritCare Time    Disposition  Final diagnoses:   Periapical abscess   Facial cellulitis     Time reflects when diagnosis was documented in both MDM as applicable and the Disposition within this note     Time User Action Codes Description Comment    1/17/2019  1:28 AM Jelly Lyman Millicent Add [K04 7] Periapical abscess     1/17/2019  1:29 AM Nevaeh Glass Add [V12 541] Facial cellulitis       ED Disposition     ED Disposition Condition Comment    Discharge  Marcela Nunes discharge to home/self care  Condition at discharge: Good        Follow-up Information     Follow up With Specialties Details Why Contact Info    Caden Chavarria MD Oral Maxillofacial Surgery, Oral Surgery Schedule an appointment as soon as possible for a visit  [address]      [facility] [place]    [address]  [facility] [address]          Discharge Medication List as of 1/17/2019  1:35 AM      START taking these medications    Details   HYDROcodone-acetaminophen (NORCO) 5-325 mg per tablet Take 1 tablet by mouth every 6 (six) hours as needed for pain for up to 10 days Max Daily Amount: 4 tablets, Starting Thu 1/17/2019, Until Sun 1/27/2019, Print      naproxen (NAPROSYN) 500 mg tablet Take 1 tablet (500 mg total) by mouth 2 (two) times a day as needed for moderate pain for up to 7 days, Starting Thu 1/17/2019, Until Thu 1/24/2019, Normal      ondansetron (ZOFRAN-ODT) 4 mg disintegrating tablet Take 1 tablet (4 mg total) by mouth every 8 (eight) hours as needed for nausea or vomiting for up to 3 days, Starting Thu 1/17/2019, Until Sun 1/20/2019, Print         CONTINUE these medications which have NOT CHANGED    Details   omeprazole (PriLOSEC) 20 mg delayed release capsule Take 1 capsule (20 mg total) by mouth 2 (two) times a day before meals for 30 days, Starting Tue 11/13/2018, Until Thu 12/13/2018, Normal      ondansetron (ZOFRAN) 4 mg tablet Take 1 tablet (4 mg total) by mouth every 8 (eight) hours as needed for nausea or vomiting, Starting Tue 11/13/2018, Normal           No discharge procedures on file      ED Provider  Electronically Signed by           Rodrigo Ramirez Jelly Lyman MD  01/19/19 7819

## 2019-02-05 ENCOUNTER — APPOINTMENT (EMERGENCY)
Dept: CT IMAGING | Facility: HOSPITAL | Age: 34
End: 2019-02-05
Payer: COMMERCIAL

## 2019-02-05 ENCOUNTER — TELEPHONE (OUTPATIENT)
Dept: GASTROENTEROLOGY | Facility: CLINIC | Age: 34
End: 2019-02-05

## 2019-02-05 ENCOUNTER — HOSPITAL ENCOUNTER (EMERGENCY)
Facility: HOSPITAL | Age: 34
Discharge: HOME | End: 2019-02-05
Attending: EMERGENCY MEDICINE
Payer: COMMERCIAL

## 2019-02-05 VITALS
TEMPERATURE: 98.3 F | SYSTOLIC BLOOD PRESSURE: 132 MMHG | HEART RATE: 78 BPM | BODY MASS INDEX: 27.62 KG/M2 | OXYGEN SATURATION: 97 % | DIASTOLIC BLOOD PRESSURE: 63 MMHG | WEIGHT: 198 LBS | RESPIRATION RATE: 14 BRPM

## 2019-02-05 DIAGNOSIS — A08.4 VIRAL GASTROENTERITIS: Primary | ICD-10-CM

## 2019-02-05 LAB
ALBUMIN SERPL BCP-MCNC: 4 G/DL (ref 3.5–5)
ALP SERPL-CCNC: 78 U/L (ref 46–116)
ALT SERPL W P-5'-P-CCNC: 31 U/L (ref 12–78)
ANION GAP SERPL CALCULATED.3IONS-SCNC: 8 MMOL/L (ref 4–13)
AST SERPL W P-5'-P-CCNC: 20 U/L (ref 5–45)
BASOPHILS # BLD AUTO: 0.04 THOUSANDS/ΜL (ref 0–0.1)
BASOPHILS NFR BLD AUTO: 0 % (ref 0–1)
BILIRUB SERPL-MCNC: 0.5 MG/DL (ref 0.2–1)
BUN SERPL-MCNC: 10 MG/DL (ref 5–25)
CALCIUM SERPL-MCNC: 9.2 MG/DL (ref 8.3–10.1)
CHLORIDE SERPL-SCNC: 105 MMOL/L (ref 100–108)
CO2 SERPL-SCNC: 28 MMOL/L (ref 21–32)
CREAT SERPL-MCNC: 0.82 MG/DL (ref 0.6–1.3)
EOSINOPHIL # BLD AUTO: 0.29 THOUSAND/ΜL (ref 0–0.61)
EOSINOPHIL NFR BLD AUTO: 3 % (ref 0–6)
ERYTHROCYTE [DISTWIDTH] IN BLOOD BY AUTOMATED COUNT: 12.9 % (ref 11.6–15.1)
GFR SERPL CREATININE-BSD FRML MDRD: 117 ML/MIN/1.73SQ M
GLUCOSE SERPL-MCNC: 79 MG/DL (ref 65–140)
HCT VFR BLD AUTO: 41.5 % (ref 36.5–49.3)
HGB BLD-MCNC: 14.1 G/DL (ref 12–17)
IMM GRANULOCYTES # BLD AUTO: 0.02 THOUSAND/UL (ref 0–0.2)
IMM GRANULOCYTES NFR BLD AUTO: 0 % (ref 0–2)
LIPASE SERPL-CCNC: 132 U/L (ref 73–393)
LYMPHOCYTES # BLD AUTO: 3.27 THOUSANDS/ΜL (ref 0.6–4.47)
LYMPHOCYTES NFR BLD AUTO: 36 % (ref 14–44)
MCH RBC QN AUTO: 31.8 PG (ref 26.8–34.3)
MCHC RBC AUTO-ENTMCNC: 34 G/DL (ref 31.4–37.4)
MCV RBC AUTO: 94 FL (ref 82–98)
MONOCYTES # BLD AUTO: 0.7 THOUSAND/ΜL (ref 0.17–1.22)
MONOCYTES NFR BLD AUTO: 8 % (ref 4–12)
NEUTROPHILS # BLD AUTO: 4.66 THOUSANDS/ΜL (ref 1.85–7.62)
NEUTS SEG NFR BLD AUTO: 53 % (ref 43–75)
NRBC BLD AUTO-RTO: 0 /100 WBCS
PLATELET # BLD AUTO: 268 THOUSANDS/UL (ref 149–390)
PMV BLD AUTO: 9.2 FL (ref 8.9–12.7)
POTASSIUM SERPL-SCNC: 3.9 MMOL/L (ref 3.5–5.3)
PROT SERPL-MCNC: 7.1 G/DL (ref 6.4–8.2)
RBC # BLD AUTO: 4.43 MILLION/UL (ref 3.88–5.62)
SODIUM SERPL-SCNC: 141 MMOL/L (ref 136–145)
WBC # BLD AUTO: 8.98 THOUSAND/UL (ref 4.31–10.16)

## 2019-02-05 PROCEDURE — 36415 COLL VENOUS BLD VENIPUNCTURE: CPT | Performed by: EMERGENCY MEDICINE

## 2019-02-05 PROCEDURE — 99285 EMERGENCY DEPT VISIT HI MDM: CPT

## 2019-02-05 PROCEDURE — 71260 CT THORAX DX C+: CPT

## 2019-02-05 PROCEDURE — 96374 THER/PROPH/DIAG INJ IV PUSH: CPT

## 2019-02-05 PROCEDURE — 85025 COMPLETE CBC W/AUTO DIFF WBC: CPT | Performed by: EMERGENCY MEDICINE

## 2019-02-05 PROCEDURE — 83690 ASSAY OF LIPASE: CPT | Performed by: EMERGENCY MEDICINE

## 2019-02-05 PROCEDURE — 74177 CT ABD & PELVIS W/CONTRAST: CPT

## 2019-02-05 PROCEDURE — 80053 COMPREHEN METABOLIC PANEL: CPT | Performed by: EMERGENCY MEDICINE

## 2019-02-05 PROCEDURE — 96361 HYDRATE IV INFUSION ADD-ON: CPT

## 2019-02-05 RX ORDER — ONDANSETRON 4 MG/1
4 TABLET, ORALLY DISINTEGRATING ORAL ONCE
Status: DISCONTINUED | OUTPATIENT
Start: 2019-02-05 | End: 2019-02-05 | Stop reason: HOSPADM

## 2019-02-05 RX ORDER — DICYCLOMINE HCL 20 MG
20 TABLET ORAL ONCE
Status: DISCONTINUED | OUTPATIENT
Start: 2019-02-05 | End: 2019-02-05 | Stop reason: HOSPADM

## 2019-02-05 RX ORDER — MORPHINE SULFATE 4 MG/ML
4 INJECTION, SOLUTION INTRAMUSCULAR; INTRAVENOUS ONCE
Status: COMPLETED | OUTPATIENT
Start: 2019-02-05 | End: 2019-02-05

## 2019-02-05 RX ORDER — ONDANSETRON 4 MG/1
4 TABLET, ORALLY DISINTEGRATING ORAL EVERY 8 HOURS PRN
Qty: 20 TABLET | Refills: 0 | Status: SHIPPED | OUTPATIENT
Start: 2019-02-05 | End: 2019-03-14 | Stop reason: HOSPADM

## 2019-02-05 RX ORDER — DICYCLOMINE HCL 20 MG
20 TABLET ORAL EVERY 6 HOURS
Qty: 20 TABLET | Refills: 0 | Status: SHIPPED | OUTPATIENT
Start: 2019-02-05 | End: 2019-03-14 | Stop reason: HOSPADM

## 2019-02-05 RX ADMIN — IOHEXOL 100 ML: 350 INJECTION, SOLUTION INTRAVENOUS at 19:36

## 2019-02-05 RX ADMIN — SODIUM CHLORIDE 1000 ML: 0.9 INJECTION, SOLUTION INTRAVENOUS at 18:33

## 2019-02-05 RX ADMIN — MORPHINE SULFATE 4 MG: 4 INJECTION INTRAVENOUS at 18:31

## 2019-02-05 NOTE — ED PROVIDER NOTES
History  Chief Complaint   Patient presents with    Weakness - Generalized     started with generalized weakness and lethargy for a few days  pt staes he has been sleeping a lot and doesnt feel well  pt has hx of perianal abcess and feels like it might be back  pt having difficult swallowing  pt also states he has been vomiting bright red blood     41-year-old male presents for evaluation of generalized weakness  He states that the the he has been feeling who fatigued with generalized weakness over the past few days  He states that he has been sleeping often because of his generalized weakness and fatigue  He denies any chest pain, no cough, no headache or neck pain  He states that he feels similar to a past time when he had a perirectal abscess and felt generalized weakness and unwell from that  He states that he has perirectal pain  He has had nausea and vomiting, the past few times of vomiting he has had some blood streaks, no modesto vomiting of blood  Patient has sore throat from the vomiting and has difficulty swallowing because of the sore throat  Prior to Admission Medications   Prescriptions Last Dose Informant Patient Reported? Taking?   naproxen (NAPROSYN) 500 mg tablet   No No   Sig: Take 1 tablet (500 mg total) by mouth 2 (two) times a day as needed for moderate pain for up to 7 days   omeprazole (PriLOSEC) 20 mg delayed release capsule   No No   Sig: Take 1 capsule (20 mg total) by mouth 2 (two) times a day before meals for 30 days   ondansetron (ZOFRAN-ODT) 4 mg disintegrating tablet   No No   Sig: Take 1 tablet (4 mg total) by mouth every 8 (eight) hours as needed for nausea or vomiting for up to 3 days      Facility-Administered Medications: None       History reviewed  No pertinent past medical history      Past Surgical History:   Procedure Laterality Date    ABCESS DRAINAGE  10/29/2018    carlos anal    VT COLONOSCOPY FLX DX W/COLLJ SPEC WHEN PFRMD N/A 11/21/2018    Procedure: COLONOSCOPY;  Surgeon: Lisbeth Lund MD;  Location: [facility]; Service: Gastroenterology    CA ESOPHAGOGASTRODUODENOSCOPY TRANSORAL DIAGNOSTIC N/A 11/21/2018    Procedure: ESOPHAGOGASTRODUODENOSCOPY (EGD); Surgeon: Lisbeth Lund MD;  Location: [facility]; Service: Gastroenterology    CA ESOPHAGOGASTRODUODENOSCOPY TRANSORAL DIAGNOSTIC N/A 2/7/2019    Procedure: ESOPHAGOGASTRODUODENOSCOPY (EGD); Surgeon: Lisbeth Lund MD;  Location: [facility]; Service: Gastroenterology       Family History   Problem Relation Age of Onset    Diabetes Father      I have reviewed and agree with the history as documented  Social History     Tobacco Use    Smoking status: Current Every Day Smoker     Packs/day: 1 00    Smokeless tobacco: Never Used   Substance Use Topics    Alcohol use: No    Drug use: Yes     Frequency: 4 0 times per week     Types: Marijuana        Review of Systems   Constitutional: Positive for fatigue  Negative for chills and fever  HENT: Positive for sore throat and trouble swallowing (Secondary throat pain)  Negative for congestion  Respiratory: Negative for apnea, cough, chest tightness and shortness of breath  Cardiovascular: Negative for chest pain and palpitations  Gastrointestinal: Positive for abdominal pain, nausea and vomiting (blood streaks)  Negative for constipation and diarrhea  Perirectal pain   Genitourinary: Negative for dysuria and flank pain  Musculoskeletal: Negative for back pain and neck pain  Skin: Negative for color change and rash  Allergic/Immunologic: Negative for immunocompromised state  Neurological: Negative for dizziness, syncope, weakness, light-headedness and headaches  Psychiatric/Behavioral: Negative for confusion  Physical Exam  Physical Exam   Constitutional: He is oriented to person, place, and time  He appears well-developed and well-nourished  No distress     Patient appears unwell however nontoxic and in no acute distress  HENT:   Head: Normocephalic and atraumatic  Oropharynx is clear but dry consistent dehydration mild erythema to the throat  Eyes: Pupils are equal, round, and reactive to light  Conjunctivae and EOM are normal  Right eye exhibits no discharge  Left eye exhibits no discharge  No scleral icterus  Neck: Normal range of motion  Neck supple  No JVD present  Cardiovascular: Normal rate, regular rhythm, normal heart sounds and intact distal pulses  Exam reveals no gallop and no friction rub  No murmur heard  Pulmonary/Chest: Effort normal and breath sounds normal  No respiratory distress  He has no wheezes  He has no rales  He exhibits no tenderness  Abdominal: Soft  Bowel sounds are normal  He exhibits no distension  There is tenderness (Diffuse)  There is no rebound and no guarding  Genitourinary:   Genitourinary Comments: Perirectal pain at the site of prior perirectal abscess (12:00 position)  No visible abscess, no redness on the skin surface, no fluctuance  Musculoskeletal: Normal range of motion  He exhibits no edema, tenderness or deformity  Neurological: He is alert and oriented to person, place, and time  No cranial nerve deficit or sensory deficit  Skin: Skin is warm and dry  No rash noted  He is not diaphoretic  No erythema  No pallor  Psychiatric: He has a normal mood and affect  His behavior is normal    Vitals reviewed        Vital Signs  ED Triage Vitals   Temperature Pulse Respirations Blood Pressure SpO2   02/05/19 1647 02/05/19 1647 02/05/19 1647 02/05/19 1649 02/05/19 1647   98 3 °F (36 8 °C) 82 16 128/55 98 %      Temp src Heart Rate Source Patient Position - Orthostatic VS BP Location FiO2 (%)   -- 02/05/19 2020 02/05/19 2020 02/05/19 2020 --    Monitor Lying Left arm       Pain Score       02/05/19 1647       No Pain           Vitals:    02/05/19 1647 02/05/19 1649 02/05/19 2020   BP:  128/55 132/63   Pulse: 82  78   Patient Position - Orthostatic VS:   Lying Visual Acuity      ED Medications  Medications   sodium chloride 0 9 % bolus 1,000 mL (0 mL Intravenous Stopped 2/5/19 1949)   morphine (PF) 4 mg/mL injection 4 mg (4 mg Intravenous Given 2/5/19 1831)   iohexol (OMNIPAQUE) 350 MG/ML injection (MULTI-DOSE) 100 mL (100 mL Intravenous Given 2/5/19 1936)       Diagnostic Studies  Results Reviewed     Procedure Component Value Units Date/Time    Lipase [[id number]]  (Normal) Collected:  02/05/19 1830    Lab Status:  Final result Specimen:  Blood from Arm, Left Updated:  02/05/19 1911     Lipase 132 u/L     Comprehensive metabolic panel [[id number]] Collected:  02/05/19 1830    Lab Status:  Final result Specimen:  Blood from Arm, Left Updated:  02/05/19 1910     Sodium 141 mmol/L      Potassium 3 9 mmol/L      Chloride 105 mmol/L      CO2 28 mmol/L      ANION GAP 8 mmol/L      BUN 10 mg/dL      Creatinine 0 82 mg/dL      Glucose 79 mg/dL      Calcium 9 2 mg/dL      AST 20 U/L      ALT 31 U/L      Alkaline Phosphatase 78 U/L      Total Protein 7 1 g/dL      Albumin 4 0 g/dL      Total Bilirubin 0 50 mg/dL      eGFR 117 ml/min/1 73sq m     Narrative:         National Kidney Disease Education Program recommendations are as follows:  GFR calculation is accurate only with a steady state creatinine  Chronic Kidney disease less than 60 ml/min/1 73 sq  meters  Kidney failure less than 15 ml/min/1 73 sq  meters      CBC and differential [[id number]] Collected:  02/05/19 1830    Lab Status:  Final result Specimen:  Blood from Arm, Left Updated:  02/05/19 1844     WBC 8 98 Thousand/uL      RBC 4 43 Million/uL      Hemoglobin 14 1 g/dL      Hematocrit 41 5 %      MCV 94 fL      MCH 31 8 pg      MCHC 34 0 g/dL      RDW 12 9 %      MPV 9 2 fL      Platelets 280 Thousands/uL      nRBC 0 /100 WBCs      Neutrophils Relative 53 %      Immat GRANS % 0 %      Lymphocytes Relative 36 %      Monocytes Relative 8 %      Eosinophils Relative 3 %      Basophils Relative 0 %      Neutrophils Absolute 4 66 Thousands/µL      Immature Grans Absolute 0 02 Thousand/uL      Lymphocytes Absolute 3 27 Thousands/µL      Monocytes Absolute 0 70 Thousand/µL      Eosinophils Absolute 0 29 Thousand/µL      Basophils Absolute 0 04 Thousands/µL                  CT chest abdomen pelvis w contrast   ED Interpretation by Shailesh Mullen DO (02/05 2034)   Perianal fistula as described, unchanged from November 13, 2018  John Segundo No perianal abscess        No acute CT abnormality in the chest, abdomen or pelvis to account for reported history of vomiting blood or abdominal pain  Final Result by Joaquin Lerma MD (02/05 1956)      Perianal fistula as described, unchanged from November 13, 2018  John Segundo No perianal abscess  No acute CT abnormality in the chest, abdomen or pelvis to account for reported history of vomiting blood or abdominal pain  Workstation performed: [id number]                    Procedures  Procedures       Phone Contacts  ED Phone Contact    ED Course  ED Course as of Feb 13 1836   Tue Feb 05, 2019   2110 Advised patient that I would like to check a urinary tract infection however patient refuses and states he does not any left to check his urine  Advised that this is therefore most likely viral gastroenteritis and patient will be treated for that  MDM  Number of Diagnoses or Management Options  Viral gastroenteritis:   Diagnosis management comments: Patient has vomiting and generalized feeling unwell  Symptoms are consistent with a viral gastritis  Patient is evaluated with abdominal lab work, which is normal   CT scan is normal as well, no evidence of the repeat perirectal abscess  Fistula appears the same as prior  Patient is given IV fluid, symptomatic treatment feels improved  He is discharged with symptomatic treatment advised good return precautions in case of signs of systemic illness, advised good follow-up instructions to ensure improvement  Amount and/or Complexity of Data Reviewed  Clinical lab tests: ordered and reviewed  Tests in the radiology section of CPT®: ordered and reviewed        Disposition  Final diagnoses:   Viral gastroenteritis     Time reflects when diagnosis was documented in both MDM as applicable and the Disposition within this note     Time User Action Codes Description Comment    2/5/2019  9:11 PM TemoDavid gonzales Add [A08 4] Viral gastroenteritis       ED Disposition     ED Disposition Condition Date/Time Comment    Discharge  Tue Feb 5, 2019  9:12 PM Colene Lennox discharge to home/self care  Condition at discharge: Good        Follow-up Information     Follow up With Specialties Details Why Contact Info Additional [address] [facility] Emergency Department Emergency Medicine  If symptoms worsen: worsening abdominal pain, fever/chills, unable to control vomiting, blood in stool, etc [address] [facility] [id number] [phone] [place] [facility], [address] [place], [place], [address] [facility] [place], [place] Family Medicine Schedule an appointment as soon as possible for a visit For follow up to ensure improvement, and for further testing and treatment as needed [address] [place] [id number]  [address] [place] [address] Gastroenterology, Nurse Practitioner Schedule an appointment as soon as possible for a visit for follow up and further testing [address]   [address] [facility] [address] [phone]             Discharge Medication List as of 2/5/2019  9:14 PM      START taking these medications    Details   dicyclomine (BENTYL) 20 mg tablet Take 1 tablet (20 mg total) by mouth every 6 (six) hours As needed for abdominal cramping, Starting Tue 2/5/2019, Normal         CONTINUE these medications which have CHANGED    Details   ondansetron (ZOFRAN-ODT) 4 mg disintegrating tablet Take 1 tablet (4 mg total) by mouth every 8 (eight) hours as needed for nausea or vomiting, Starting Tue 2/5/2019, Print         CONTINUE these medications which have NOT CHANGED    Details   naproxen (NAPROSYN) 500 mg tablet Take 1 tablet (500 mg total) by mouth 2 (two) times a day as needed for moderate pain for up to 7 days, Starting Thu 1/17/2019, Until Thu 1/24/2019, Normal      omeprazole (PriLOSEC) 20 mg delayed release capsule Take 1 capsule (20 mg total) by mouth 2 (two) times a day before meals for 30 days, Starting Tue 11/13/2018, Until Thu 12/13/2018, Normal      ondansetron (ZOFRAN) 4 mg tablet Take 1 tablet (4 mg total) by mouth every 8 (eight) hours as needed for nausea or vomiting, Starting Tue 11/13/2018, Normal           No discharge procedures on file      ED Provider  Electronically Signed by           Alek Lawler DO  02/13/19 0736

## 2019-02-06 ENCOUNTER — ANESTHESIA EVENT (OUTPATIENT)
Dept: PERIOP | Facility: HOSPITAL | Age: 34
End: 2019-02-06
Payer: COMMERCIAL

## 2019-02-06 ENCOUNTER — OFFICE VISIT (OUTPATIENT)
Dept: GASTROENTEROLOGY | Facility: CLINIC | Age: 34
End: 2019-02-06
Payer: COMMERCIAL

## 2019-02-06 VITALS
HEIGHT: 71 IN | BODY MASS INDEX: 28.14 KG/M2 | WEIGHT: 201 LBS | DIASTOLIC BLOOD PRESSURE: 70 MMHG | HEART RATE: 85 BPM | SYSTOLIC BLOOD PRESSURE: 118 MMHG

## 2019-02-06 DIAGNOSIS — R11.0 NAUSEA: ICD-10-CM

## 2019-02-06 DIAGNOSIS — R13.19 ESOPHAGEAL DYSPHAGIA: ICD-10-CM

## 2019-02-06 DIAGNOSIS — K60.3 ANAL FISTULA: Primary | ICD-10-CM

## 2019-02-06 DIAGNOSIS — K61.1 PERIRECTAL ABSCESS: ICD-10-CM

## 2019-02-06 DIAGNOSIS — R11.2 NAUSEA AND VOMITING, INTRACTABILITY OF VOMITING NOT SPECIFIED, UNSPECIFIED VOMITING TYPE: ICD-10-CM

## 2019-02-06 DIAGNOSIS — K60.3 ANAL FISTULA: ICD-10-CM

## 2019-02-06 DIAGNOSIS — R13.13 PHARYNGEAL DYSPHAGIA: Primary | ICD-10-CM

## 2019-02-06 PROBLEM — K60.30 ANAL FISTULA: Status: ACTIVE | Noted: 2019-02-06

## 2019-02-06 PROCEDURE — 99214 OFFICE O/P EST MOD 30 MIN: CPT | Performed by: NURSE PRACTITIONER

## 2019-02-06 RX ORDER — OMEPRAZOLE 40 MG/1
40 CAPSULE, DELAYED RELEASE ORAL DAILY
Qty: 90 CAPSULE | Refills: 3 | Status: SHIPPED | OUTPATIENT
Start: 2019-02-06 | End: 2019-03-14 | Stop reason: HOSPADM

## 2019-02-06 RX ORDER — ONDANSETRON 4 MG/1
4 TABLET, FILM COATED ORAL EVERY 8 HOURS PRN
Qty: 20 TABLET | Refills: 0 | Status: SHIPPED | OUTPATIENT
Start: 2019-02-06 | End: 2019-03-14 | Stop reason: HOSPADM

## 2019-02-06 RX ORDER — FAMOTIDINE 40 MG/1
40 TABLET, FILM COATED ORAL
Qty: 90 TABLET | Refills: 3 | Status: SHIPPED | OUTPATIENT
Start: 2019-02-06 | End: 2019-03-14 | Stop reason: HOSPADM

## 2019-02-06 NOTE — PROGRESS NOTES
Assessment/Plan:    Patient sent to ER yesterday with follow up for today if not admitted  Slept for 15 hours- felt a cyst and then believes it popped  H/O repeated perianal abscess requiring drainage  CT completed in ER and read by radiologist:  Perianal fistula as described, unchanged from November 13, 2018  Ioana Estrada No perianal abscess  Normal CBC and CMP    Vomit - Bright red blood streaked X1 last week, dysphagia  Still with esophageal dysphagia and heartburn after eating  Continues with nausea  Stopped his PPI  Last EGD a few months ago and dilation performed  Plan:  EGD, PPI, H2 and Gaviscon- pt to not stop it unless told by me    H/O rectal abcess with fistula  Fistula seen on CT- no Crohns, normal colon a few months ago with benign polyp  Plan:  Referral for surgical evaluation with Dr Lakesha Gaitan               Diagnoses and all orders for this visit:    Pharyngeal dysphagia  -     omeprazole (PriLOSEC) 40 MG capsule; Take 1 capsule (40 mg total) by mouth daily for 90 days  -     famotidine (PEPCID) 40 MG tablet; Take 1 tablet (40 mg total) by mouth daily at bedtime  -     aluminum hydroxide-magnesium carbonate (GAVISCON)  mg/15 mL oral suspension; Take 15 mL by mouth 4 (four) times daily (after meals and at bedtime)    Nausea  -     ondansetron (ZOFRAN) 4 mg tablet; Take 1 tablet (4 mg total) by mouth every 8 (eight) hours as needed for nausea or vomiting  -     omeprazole (PriLOSEC) 40 MG capsule; Take 1 capsule (40 mg total) by mouth daily for 90 days  -     famotidine (PEPCID) 40 MG tablet; Take 1 tablet (40 mg total) by mouth daily at bedtime  -     aluminum hydroxide-magnesium carbonate (GAVISCON)  mg/15 mL oral suspension; Take 15 mL by mouth 4 (four) times daily (after meals and at bedtime)    Esophageal dysphagia  -     omeprazole (PriLOSEC) 40 MG capsule; Take 1 capsule (40 mg total) by mouth daily for 90 days  -     famotidine (PEPCID) 40 MG tablet;  Take 1 tablet (40 mg total) by mouth daily at bedtime  -     aluminum hydroxide-magnesium carbonate (GAVISCON)  mg/15 mL oral suspension; Take 15 mL by mouth 4 (four) times daily (after meals and at bedtime)    Nausea and vomiting, intractability of vomiting not specified, unspecified vomiting type  -     omeprazole (PriLOSEC) 40 MG capsule; Take 1 capsule (40 mg total) by mouth daily for 90 days  -     famotidine (PEPCID) 40 MG tablet; Take 1 tablet (40 mg total) by mouth daily at bedtime  -     aluminum hydroxide-magnesium carbonate (GAVISCON)  mg/15 mL oral suspension; Take 15 mL by mouth 4 (four) times daily (after meals and at bedtime)    Perirectal abscess    Anal fistula            Subjective:      Patient ID: Clary Bowie is a 29 y o  male  Patient sent to ER yesterday with follow up for today if not admitted  Slept for 15 hours- felt a cyst and then believes it popped in his rectum  Did not notice drainage  + rectal pain  No constipation, diarrhea, melena or hematochezia  Triple abx cream applied with exam  H/O repeated perianal abcess requiring drainage  CT completed in ER showed perianal fistula as seen on prior  Pain at rectum and examined- pinpoint opening seen at 6:00, tender with no redness or drainage  Normal CBC and CMP  Vomit - Bright red blood streaked X1 last week, dysphagia  Still with esophageal dysphagia and heartburn after eating  Pain in chest with swallowing  Throat does look read- no lymphadenopathy, fever  Continues with nausea  Stopped his PPI on his own  Last EGD a few months ago and dilation performed  Continues to smoke cigarettes- strongly encouraged to quit  The following portions of the patient's history were reviewed and updated as appropriate: He  has no past medical history on file    He   Patient Active Problem List    Diagnosis Date Noted    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    Intractable vomiting with nausea 02/06/2019    Internal hemorrhoids 94/40/1947    Umbilical bleeding 11/13/2018    Nausea 11/13/2018    Rectal pain 11/13/2018    Pharyngeal dysphagia 11/13/2018    History of rectal abscess 85/00/8137    Periumbilical pain, chronic 22/33/1269    Decreased appetite 11/13/2018    Other fatigue 11/13/2018    Migraine 11/13/2018    Toshia-rectal abscess 11/13/2018    Nausea and vomiting 11/13/2018    Dysphagia 11/13/2018    Rectal abscess 11/13/2018    Perirectal abscess 11/13/2018     He  has a past surgical history that includes Abscess drainage (10/29/2018); pr colonoscopy flx dx w/collj spec when pfrmd (N/A, 11/21/2018); and pr esophagogastroduodenoscopy transoral diagnostic (N/A, 11/21/2018)  His family history includes Diabetes in his father  He  reports that he has been smoking  He has been smoking about 1 00 pack per day  He has never used smokeless tobacco  He reports that he does not drink alcohol or use drugs    Current Outpatient Prescriptions   Medication Sig Dispense Refill    dicyclomine (BENTYL) 20 mg tablet Take 1 tablet (20 mg total) by mouth every 6 (six) hours As needed for abdominal cramping 20 tablet 0    ondansetron (ZOFRAN) 4 mg tablet Take 1 tablet (4 mg total) by mouth every 8 (eight) hours as needed for nausea or vomiting 20 tablet 0    ondansetron (ZOFRAN-ODT) 4 mg disintegrating tablet Take 1 tablet (4 mg total) by mouth every 8 (eight) hours as needed for nausea or vomiting 20 tablet 0    aluminum hydroxide-magnesium carbonate (GAVISCON)  mg/15 mL oral suspension Take 15 mL by mouth 4 (four) times daily (after meals and at bedtime) 1 Bottle 0    famotidine (PEPCID) 40 MG tablet Take 1 tablet (40 mg total) by mouth daily at bedtime 90 tablet 3    naproxen (NAPROSYN) 500 mg tablet Take 1 tablet (500 mg total) by mouth 2 (two) times a day as needed for moderate pain for up to 7 days 14 tablet 0    omeprazole (PriLOSEC) 20 mg delayed release capsule Take 1 capsule (20 mg total) by mouth 2 (two) times a day before meals for 30 days 60 capsule 0    omeprazole (PriLOSEC) 40 MG capsule Take 1 capsule (40 mg total) by mouth daily for 90 days 90 capsule 3    ondansetron (ZOFRAN-ODT) 4 mg disintegrating tablet Take 1 tablet (4 mg total) by mouth every 8 (eight) hours as needed for nausea or vomiting for up to 3 days 12 tablet 0     No current facility-administered medications for this visit  Current Outpatient Prescriptions on File Prior to Visit   Medication Sig    dicyclomine (BENTYL) 20 mg tablet Take 1 tablet (20 mg total) by mouth every 6 (six) hours As needed for abdominal cramping    ondansetron (ZOFRAN-ODT) 4 mg disintegrating tablet Take 1 tablet (4 mg total) by mouth every 8 (eight) hours as needed for nausea or vomiting    [DISCONTINUED] ondansetron (ZOFRAN) 4 mg tablet Take 1 tablet (4 mg total) by mouth every 8 (eight) hours as needed for nausea or vomiting    naproxen (NAPROSYN) 500 mg tablet Take 1 tablet (500 mg total) by mouth 2 (two) times a day as needed for moderate pain for up to 7 days    omeprazole (PriLOSEC) 20 mg delayed release capsule Take 1 capsule (20 mg total) by mouth 2 (two) times a day before meals for 30 days    ondansetron (ZOFRAN-ODT) 4 mg disintegrating tablet Take 1 tablet (4 mg total) by mouth every 8 (eight) hours as needed for nausea or vomiting for up to 3 days     Current Facility-Administered Medications on File Prior to Visit   Medication    [COMPLETED] iohexol (OMNIPAQUE) 350 MG/ML injection (MULTI-DOSE) 100 mL    [COMPLETED] morphine (PF) 4 mg/mL injection 4 mg    [COMPLETED] sodium chloride 0 9 % bolus 1,000 mL    [DISCONTINUED] dicyclomine (BENTYL) tablet 20 mg    [DISCONTINUED] ondansetron (ZOFRAN-ODT) dispersible tablet 4 mg     He has No Known Allergies       Review of Systems   Constitutional: Positive for appetite change and fatigue  Negative for activity change, chills, diaphoresis and unexpected weight change  HENT: Positive for trouble swallowing   Negative for mouth sores, sore throat and voice change  Eyes: Negative for pain  Respiratory: Negative  Cardiovascular: Negative  Gastrointestinal: Positive for nausea, rectal pain and vomiting  Endocrine: Negative  Musculoskeletal: Negative for arthralgias  Skin: Negative  Hematological: Negative for adenopathy  Does not bruise/bleed easily  Psychiatric/Behavioral: Negative  Objective:      /70   Pulse 85   Ht 5' 11" (1 803 m)   Wt 91 2 kg (201 lb)   BMI 28 03 kg/m²          Physical Exam   Constitutional: He is oriented to person, place, and time  He appears well-developed and well-nourished  HENT:   Red throat   Eyes: No scleral icterus  Cardiovascular: Normal rate and regular rhythm  Pulmonary/Chest: Effort normal and breath sounds normal    Abdominal: Soft  Bowel sounds are normal    Genitourinary:   Genitourinary Comments: Pinpoint, tender, nonbleeding, no red opening at 6:00   Lymphadenopathy:     He has no cervical adenopathy  Neurological: He is alert and oriented to person, place, and time  Skin: Skin is warm and dry  Psychiatric: He has a normal mood and affect

## 2019-02-06 NOTE — ED NOTES
Patient transported to [address] [facility],[address], [address] [facility]  02/05/19 1931

## 2019-02-06 NOTE — DISCHARGE INSTRUCTIONS
Gastroenteritis, Ambulatory Care   GENERAL INFORMATION:   Gastroenteritis , or stomach flu, is an infection of the stomach and intestines  It is caused by bacteria, parasites, or viruses  Common symptoms include the following:   · Diarrhea or gas    · Nausea, vomiting, or poor appetite    · Abdominal cramps, pain, or gurgling    · Fever    · Tiredness or weakness    · Headaches or muscle aches with any of the above symptoms  Seek immediate care for the following symptoms:   · Blood in your diarrhea    · Unable to stop vomiting    · No urinating for 12 hours    · Legs or arms that are blue    · Trouble breathing or a very fast pulse    · Lightheadedness or dizziness  Treatment for gastroenteritis  may include medicines to stop your diarrhea and vomiting  You may also need medicines to treat an infection caused by bacteria or parasites  Manage your symptoms:   · Drink liquids as directed  Ask how much liquid to drink each day and which liquids are best for you  You may need to drink more liquids than usual to prevent dehydration  Suck on ice or take small sips of liquids often if you have trouble keeping liquids down  · Drink oral rehydration solution (ORS) as directed  An ORS contains water, salts, and sugar that are needed to replace lost body fluids  Ask what kind of ORS to use and how much to drink  · Eat bland foods  When you feel hungry, begin to eat soft, bland foods  Examples are bananas, clear soup, potatoes, and applesauce  Do not eat dairy products, alcohol, sugary drinks, or caffeine until you feel better  Prevent the spread of germs:   · Wash your hands often  Use soap and water  Wash your hands after you use the bathroom, change a child's diapers, or sneeze  Wash your hands before you prepare or eat food  · Clean surfaces and do laundry often  Wash your clothes and towels separately from the rest of the laundry   Clean surfaces in your home with antibacterial  or bleach  · Clean food thoroughly and cook safely  Wash raw vegetables before you cook  Cook meat, fish, and eggs fully  Do not use the same dishes for raw meat as you do for other foods  Refrigerate any leftover food immediately  · Be aware when you camp or travel  Drink only clean water  Do not drink from rivers or lakes unless you purify or boil the water first  When you travel, drink bottled water and avoid ice  Do not eat fruit that has not been peeled  Avoid raw fish or meat that is not fully cooked  Follow up with your healthcare provider as directed:  Write down your questions so you remember to ask them during your visits  CARE AGREEMENT:   You have the right to help plan your care  Learn about your health condition and how it may be treated  Discuss treatment options with your caregivers to decide what care you want to receive  You always have the right to refuse treatment  The above information is an  only  It is not intended as medical advice for individual conditions or treatments  Talk to your doctor, nurse or pharmacist before following any medical regimen to see if it is safe and effective for you  © 2014 [address] is for End User's use only and may not be sold, redistributed or otherwise used for commercial purposes  All illustrations and images included in CareNotes® are the copyrighted property of [employer]  or Poncho Mckenna  Enteritis   WHAT YOU NEED TO KNOW:   What is enteritis and what causes it? Enteritis is inflammation of the small intestine  The following may cause enteritis:  · Eating foods or drinking liquids contaminated with a virus, bacteria, or parasites    · Medicines such as antibiotics or anticancer drugs    · Damage from radiation therapy to the pelvic area (radiation enteritis)    · Medical conditions such as Crohn disease or celiac disease  What are the signs and symptoms of enteritis? · Diarrhea    · Blood or mucus in your bowel movements    · Nausea and vomiting    · Fever    · Abdominal pain  How is enteritis diagnosed? Your healthcare provider will examine you  He will ask about your symptoms and when they started  He may ask you if you have traveled to a foreign country recently  He will also ask you about any medical conditions you have, medicines you take, or treatments you have had recently  You may also need a blood or bowel movement sample tested for the germ causing your enteritis  How is enteritis treated and managed? Treatment for enteritis depends on the cause  Enteritis may get better on its own, or you may need any of the following:  · Medicines  may be given to fight an infection caused by bacteria or a parasite  You may also need medicines to slow or stop your diarrhea or vomiting  Do not take these medicines unless your healthcare provider say it is okay  Other medicines may be needed to treat medical conditions that are causing enteritis  · Eat foods that help to decrease symptoms  Limit or avoid foods and liquids that are high in sugar, fat, and fiber to help relieve diarrhea  It may be helpful to avoid lactose  Lactose is a type of sugar that is found in milk products  You may be able to tolerate soups, broths, well-cooked vegetables, canned fruit, and baked or broiled meats  Ask your dietitian or healthcare provider if you should follow a special diet  You may need to avoid other foods if you have certain medical conditions such as celiac disease  · Drink liquids as directed  Ask how much liquid to drink each day and which liquids are best for you  It is important to prevent or treat dehydration  Even if you have been vomiting, suck on ice chips or take small sips of clear liquids often  Slowly increase the amount of clear liquids you drink  If you become dehydrated, you may need IV liquids  · Drink an oral rehydration solution (ORS) as directed    An ORS contains water, salts, and sugar that are needed to replace lost body fluids  Ask what kind of ORS to use, how much to drink, and where to get it  How can I help prevent enteritis? Enteritis that is caused by bacteria, parasites, or viruses can be prevented  The following may help to prevent this type of enteritis:  · Wash your hands often  Use soap and water  Wash your hands after you use the bathroom, change a child's diapers, or sneeze  Wash your hands before you prepare or eat food  · Clean surfaces and do laundry often  Wash your clothes and towels separately from the rest of the laundry  Clean surfaces in your home with antibacterial  or bleach  · Clean food thoroughly and cook safely  Wash raw vegetables before you cook  Cook meat, fish, and eggs fully  Do not use the same dishes for raw meat as you do for other foods  Refrigerate any leftover food immediately  · Be aware when you camp or travel  Drink only clean water  Do not drink from rivers or lakes unless you purify or boil the water first  When you travel, drink bottled water and do not add ice  Do not eat fruit that has not been peeled  Do not eat raw fish or meat that is not fully cooked  When should I seek immediate care? · You cannot stop vomiting  · You have not urinated for 12 hours  When should I contact my healthcare provider? · You have a fever over 101 5  · You have blood or mucus in your bowel movements  · You continue to vomit or have diarrhea for more than 3 days, even after treatment  · You have a dry mouth and eyes, you are urinating less than usual, and you feel dizzy when you stand up  · Your mouth or eyes are dry  You are not urinating as much or as often  · You are losing weight without trying  · You have questions or concerns about your condition or care  CARE AGREEMENT:   You have the right to help plan your care  Learn about your health condition and how it may be treated  Discuss treatment options with your caregivers to decide what care you want to receive  You always have the right to refuse treatment  The above information is an  only  It is not intended as medical advice for individual conditions or treatments  Talk to your doctor, nurse or pharmacist before following any medical regimen to see if it is safe and effective for you  © 2017 [address] Information is for End User's use only and may not be sold, redistributed or otherwise used for commercial purposes  All illustrations and images included in CareNotes® are the copyrighted property of [employer]  or Ponchosanjay Mckenna  Abdominal Pain   WHAT YOU NEED TO KNOW:   Abdominal pain can be dull, achy, or sharp  You may have pain in one area of your abdomen, or in your entire abdomen  Your pain may be caused by a condition such as constipation, food sensitivity or poisoning, infection, or a blockage  Abdominal pain can also be from a hernia, appendicitis, or an ulcer  Liver, gallbladder, or kidney conditions can also cause abdominal pain  The cause of your abdominal pain may be unknown  DISCHARGE INSTRUCTIONS:   Return to the emergency department if:   · You have new chest pain or shortness of breath  · You have pulsing pain in your upper abdomen or lower back that suddenly becomes constant  · Your pain is in the right lower abdominal area and worsens with movement  · You have a fever over 100 4°F (38°C) or shaking chills  · You are vomiting and cannot keep food or liquids down  · Your pain does not improve or gets worse over the next 8 to 12 hours  · You see blood in your vomit or bowel movements, or they look black and tarry  · Your skin or the whites of your eyes turn yellow  · You are a woman and have a large amount of vaginal bleeding that is not your monthly period  Contact your healthcare provider if:   · You have pain in your lower back  · You are a man and have pain in your testicles  · You have pain when you urinate  · You have questions or concerns about your condition or care  Follow up with your healthcare provider within 24 hours or as directed:  Write down your questions so you remember to ask them during your visits  Medicines:   · Medicines  may be given to calm your stomach and prevent vomiting or to decrease pain  Ask how to take pain medicine safely  · Take your medicine as directed  Contact your healthcare provider if you think your medicine is not helping or if you have side effects  Tell him of her if you are allergic to any medicine  Keep a list of the medicines, vitamins, and herbs you take  Include the amounts, and when and why you take them  Bring the list or the pill bottles to follow-up visits  Carry your medicine list with you in case of an emergency  © 2017 [address] Information is for End User's use only and may not be sold, redistributed or otherwise used for commercial purposes  All illustrations and images included in CareNotes® are the copyrighted property of [employer] M [employer] Inc  or Poncho Mckenna  The above information is an  only  It is not intended as medical advice for individual conditions or treatments  Talk to your doctor, nurse or pharmacist before following any medical regimen to see if it is safe and effective for you

## 2019-02-07 ENCOUNTER — HOSPITAL ENCOUNTER (OUTPATIENT)
Facility: HOSPITAL | Age: 34
Setting detail: OUTPATIENT SURGERY
Discharge: HOME | End: 2019-02-07
Attending: INTERNAL MEDICINE | Admitting: INTERNAL MEDICINE
Payer: COMMERCIAL

## 2019-02-07 ENCOUNTER — ANESTHESIA (OUTPATIENT)
Dept: PERIOP | Facility: HOSPITAL | Age: 34
End: 2019-02-07
Payer: COMMERCIAL

## 2019-02-07 ENCOUNTER — TELEPHONE (OUTPATIENT)
Dept: GASTROENTEROLOGY | Facility: CLINIC | Age: 34
End: 2019-02-07

## 2019-02-07 VITALS
TEMPERATURE: 97.5 F | HEART RATE: 70 BPM | RESPIRATION RATE: 16 BRPM | DIASTOLIC BLOOD PRESSURE: 60 MMHG | WEIGHT: 201.94 LBS | SYSTOLIC BLOOD PRESSURE: 114 MMHG | HEIGHT: 71 IN | OXYGEN SATURATION: 97 % | BODY MASS INDEX: 28.27 KG/M2

## 2019-02-07 DIAGNOSIS — R13.13 PHARYNGEAL DYSPHAGIA: ICD-10-CM

## 2019-02-07 DIAGNOSIS — R11.10 VOMITING, INTRACTABILITY OF VOMITING NOT SPECIFIED, PRESENCE OF NAUSEA NOT SPECIFIED, UNSPECIFIED VOMITING TYPE: Primary | ICD-10-CM

## 2019-02-07 DIAGNOSIS — R11.2 INTRACTABLE VOMITING WITH NAUSEA, UNSPECIFIED VOMITING TYPE: ICD-10-CM

## 2019-02-07 PROCEDURE — 88342 IMHCHEM/IMCYTCHM 1ST ANTB: CPT | Performed by: PATHOLOGY

## 2019-02-07 PROCEDURE — 43248 EGD GUIDE WIRE INSERTION: CPT | Performed by: INTERNAL MEDICINE

## 2019-02-07 PROCEDURE — 88305 TISSUE EXAM BY PATHOLOGIST: CPT | Performed by: PATHOLOGY

## 2019-02-07 PROCEDURE — 43239 EGD BIOPSY SINGLE/MULTIPLE: CPT | Performed by: INTERNAL MEDICINE

## 2019-02-07 RX ORDER — PROPOFOL 10 MG/ML
INJECTION, EMULSION INTRAVENOUS AS NEEDED
Status: DISCONTINUED | OUTPATIENT
Start: 2019-02-07 | End: 2019-02-07 | Stop reason: SURG

## 2019-02-07 RX ORDER — SODIUM CHLORIDE, SODIUM LACTATE, POTASSIUM CHLORIDE, CALCIUM CHLORIDE 600; 310; 30; 20 MG/100ML; MG/100ML; MG/100ML; MG/100ML
125 INJECTION, SOLUTION INTRAVENOUS CONTINUOUS
Status: DISCONTINUED | OUTPATIENT
Start: 2019-02-07 | End: 2019-02-07 | Stop reason: HOSPADM

## 2019-02-07 RX ORDER — LIDOCAINE HYDROCHLORIDE 10 MG/ML
INJECTION, SOLUTION INFILTRATION; PERINEURAL AS NEEDED
Status: DISCONTINUED | OUTPATIENT
Start: 2019-02-07 | End: 2019-02-07 | Stop reason: SURG

## 2019-02-07 RX ADMIN — LIDOCAINE HYDROCHLORIDE 50 MG: 10 INJECTION, SOLUTION INFILTRATION; PERINEURAL at 08:18

## 2019-02-07 RX ADMIN — PROPOFOL 150 MG: 10 INJECTION, EMULSION INTRAVENOUS at 08:18

## 2019-02-07 RX ADMIN — PROPOFOL 50 MG: 10 INJECTION, EMULSION INTRAVENOUS at 08:24

## 2019-02-07 RX ADMIN — PROPOFOL 50 MG: 10 INJECTION, EMULSION INTRAVENOUS at 08:22

## 2019-02-07 RX ADMIN — SODIUM CHLORIDE, SODIUM LACTATE, POTASSIUM CHLORIDE, AND CALCIUM CHLORIDE 125 ML/HR: .6; .31; .03; .02 INJECTION, SOLUTION INTRAVENOUS at 07:54

## 2019-02-07 RX ADMIN — PROPOFOL 50 MG: 10 INJECTION, EMULSION INTRAVENOUS at 08:20

## 2019-02-07 NOTE — ANESTHESIA PREPROCEDURE EVALUATION
Review of Systems/Medical History  Patient summary reviewed  Chart reviewed      Cardiovascular  Negative cardio ROS    Pulmonary  Negative pulmonary ROS Smoker cigarette smoker  , Tobacco cessation counseling given ,        GI/Hepatic  Dysphagia,   GERD ,        Negative  ROS        Endo/Other  Negative endo/other ROS      GYN  Negative gynecology ROS          Hematology  Negative hematology ROS      Musculoskeletal  Negative musculoskeletal ROS        Neurology    Headaches,    Psychology   Negative psychology ROS              Physical Exam    Airway    Mallampati score: III  TM Distance: <3 FB  Neck ROM: full     Dental   Comment: Poor dentition ,     Cardiovascular  Comment: Negative ROS, Rhythm: regular, Rate: normal,     Pulmonary  Breath sounds clear to auscultation,     Other Findings  Very poor dentition      Anesthesia Plan  ASA Score- 2     Anesthesia Type- IV sedation with anesthesia with ASA Monitors  Additional Monitors:   Airway Plan:         Plan Factors-    Induction- intravenous  Postoperative Plan- Plan for postoperative opioid use  Informed Consent- Anesthetic plan and risks discussed with patient  I personally reviewed this patient with the CRNA  Discussed and agreed on the Anesthesia Plan with the CRNA  Mariana Acosta

## 2019-02-07 NOTE — OP NOTE
Postoperative Note  PATIENT NAME: Erika Bueno  : 1985  MRN: [id number]  [facility] 01    Surgery Date: 2019    POST-OP DIAGNOSIS: See the impression below    SEDATION: Monitored anesthesia care, check anesthesia records    PHYSICAL EXAM:  Vitals:    19 0747   BP: 114/56   Pulse: 69   Resp: 16   Temp: 97 5 °F (36 4 °C)   SpO2: 96%     Body mass index is 28 17 kg/m²  General: NAD  Heart: S1 & S2 normal, RRR  Lungs: CTA, No rales or rhonchi  Abdomen: Soft, nontender, nondistended, good bowel sounds    CONSENT:  Informed consent was obtained for the procedure, including sedation after explaining the risks and benefits of the procedure  Risks including but not limited to bleeding, perforation, infection, aspiration were discussed in detail  Also explained about less than 100%$ sensitivity with the exam and other alternatives  DESCRIPTION:   Procedure:  Esophagogastroduodenoscopy with biopsy and Savary guidewire dilatation    Indications:  20-year-old male with hematemesis, heartburn, dysphagia    ASA 2    Estimated blood loss: Insignificant    Premedicated with mac anesthesia    Patient is identified by me  He is examined prior to the procedure and found to be in stable condition  He is attached to the cardiac monitor and pulse oximeter and placed in the left lateral position  After adequate intravenous sedation the Olympus video gastroscope was inserted under direct vision into the esophagus  The esophageal mucosa is normal until the Z-line is reached at 38 cm  Here there is a smooth circumferential mild stricture  There is no ulceration  Beyond this there is a 2 cm hiatal hernia  The stomach is entered  There is a large amount of retained food in the greater curvature dependent portion of the stomach    The pre-pyloric antrum was normal   The pylorus is normal   The duodenum was cannulated into the 3rd portion and is normal   Retroversion reveals a normal GE junction fundus and cardia  Biopsies taken of the antrum with excellent hemostasis  Biopsies then taken of the GE junction stricture and the proximal esophagus with excellent hemostasis  A guidewire was then placed in the antrum and the scope was removed  A single dilatation with a 54 Bulgarian Savary dilator was performed  The dilator and guidewire are removed in tandem  He tolerated the procedure well and was stable throughout  My impression  1  Esophageal stricture, status post biopsy and  dilatation with Savary dilator with guidewire  2  Status post esophageal biopsy for eosinophilic esophagitis  3  Hiatal hernia  4  Gastroparesis, status post gastric biopsy     RECOMMENDATIONS:  Follow up biopsy results in 1 week  Continue current medical management  Gastric emptying study    COMPLICATIONS:  None; patient tolerated the procedure well  DISPOSITION: PACU  CONDITION: Stable    Keyanna Hsieh MD  2/7/2019,8:25 AM        Portions of the record may have been created with voice recognition software   Occasional wrong word or "sound a like" substitutions may have occurred due to the inherent limitations of voice recognition software   Read the chart carefully and recognize, using context, where substitutions have occurred

## 2019-02-07 NOTE — ANESTHESIA POSTPROCEDURE EVALUATION
Post-Op Assessment Note      CV Status:  Stable    Mental Status:  Somnolent    Hydration Status:  Euvolemic    PONV Controlled:  Controlled    Airway Patency:  Patent    Post Op Vitals Reviewed: Yes          Staff: CRNA           /69 (02/07/19 0827)    Temp      Pulse 87 (02/07/19 0827)   Resp (!) 24 (02/07/19 0827)    SpO2 95 % (02/07/19 0827)

## 2019-02-07 NOTE — TELEPHONE ENCOUNTER
Called pt and left a detailed message stating that he would need to get scheduled for a gastric emptying study  Also left the number for central scheduling

## 2019-02-07 NOTE — DISCHARGE INSTR - AVS FIRST PAGE
Postoperative Note  PATIENT NAME: Clary Bowie  : 1985  MRN: [id number]  [facility]    Surgery Date: 2019    POST-OP DIAGNOSIS: See the impression below    SEDATION: Monitored anesthesia care, check anesthesia records    PHYSICAL EXAM:  Vitals:    19 0747   BP: 114/56   Pulse: 69   Resp: 16   Temp: 97 5 °F (36 4 °C)   SpO2: 96%     Body mass index is 28 17 kg/m²  General: NAD  Heart: S1 & S2 normal, RRR  Lungs: CTA, No rales or rhonchi  Abdomen: Soft, nontender, nondistended, good bowel sounds    CONSENT:  Informed consent was obtained for the procedure, including sedation after explaining the risks and benefits of the procedure  Risks including but not limited to bleeding, perforation, infection, aspiration were discussed in detail  Also explained about less than 100%$ sensitivity with the exam and other alternatives  DESCRIPTION:   Procedure:  Esophagogastroduodenoscopy with biopsy and Savary guidewire dilatation    Indications:  41-year-old male with hematemesis, heartburn, dysphagia    ASA 2    Estimated blood loss: Insignificant    Premedicated with mac anesthesia    Patient is identified by me  He is examined prior to the procedure and found to be in stable condition  He is attached to the cardiac monitor and pulse oximeter and placed in the left lateral position  After adequate intravenous sedation the Olympus video gastroscope was inserted under direct vision into the esophagus  The esophageal mucosa is normal until the Z-line is reached at 38 cm  Here there is a smooth circumferential mild stricture  There is no ulceration  Beyond this there is a 2 cm hiatal hernia  The stomach is entered  There is a large amount of retained food in the greater curvature dependent portion of the stomach    The pre-pyloric antrum was normal   The pylorus is normal   The duodenum was cannulated into the 3rd portion and is normal   Retroversion reveals a normal GE junction fundus and cardia  Biopsies taken of the antrum with excellent hemostasis  Biopsies then taken of the GE junction stricture and the proximal esophagus with excellent hemostasis  A guidewire was then placed in the antrum and the scope was removed  A single dilatation with a 54 English Savary dilator was performed  The dilator and guidewire are removed in tandem  He tolerated the procedure well and was stable throughout  My impression  1  Esophageal stricture, status post biopsy and  dilatation with Savary dilator with guidewire  2  Status post esophageal biopsy for eosinophilic esophagitis  3  Hiatal hernia  4  Gastroparesis, status post gastric biopsy     RECOMMENDATIONS:  Follow up biopsy results in 1 week  Continue current medical management  Gastric emptying study    COMPLICATIONS:  None; patient tolerated the procedure well  DISPOSITION: PACU  CONDITION: Stable    Roberta Trent MD  2/7/2019,8:25 AM        Portions of the record may have been created with voice recognition software   Occasional wrong word or "sound a like" substitutions may have occurred due to the inherent limitations of voice recognition software   Read the chart carefully and recognize, using context, where substitutions have occurred

## 2019-02-14 ENCOUNTER — TELEPHONE (OUTPATIENT)
Dept: GASTROENTEROLOGY | Facility: CLINIC | Age: 34
End: 2019-02-14

## 2019-02-14 NOTE — TELEPHONE ENCOUNTER
Results given  He is going to schedule is gastric emptying study and call me afterwards for the results

## 2019-03-12 ENCOUNTER — HOSPITAL ENCOUNTER (INPATIENT)
Facility: HOSPITAL | Age: 34
LOS: 2 days | Discharge: HOME | DRG: 226 | End: 2019-03-14
Attending: EMERGENCY MEDICINE | Admitting: INTERNAL MEDICINE
Payer: COMMERCIAL

## 2019-03-12 ENCOUNTER — APPOINTMENT (EMERGENCY)
Dept: CT IMAGING | Facility: HOSPITAL | Age: 34
DRG: 226 | End: 2019-03-12
Payer: COMMERCIAL

## 2019-03-12 DIAGNOSIS — K60.3 PERIANAL FISTULA: Primary | ICD-10-CM

## 2019-03-12 DIAGNOSIS — K60.3 ANAL FISTULA: ICD-10-CM

## 2019-03-12 PROBLEM — K61.0 ANAL ABSCESS: Status: ACTIVE | Noted: 2019-03-12

## 2019-03-12 LAB
ALBUMIN SERPL BCP-MCNC: 4 G/DL (ref 3.5–5)
ALP SERPL-CCNC: 88 U/L (ref 46–116)
ALT SERPL W P-5'-P-CCNC: 41 U/L (ref 12–78)
ANION GAP SERPL CALCULATED.3IONS-SCNC: 9 MMOL/L (ref 4–13)
APTT PPP: 30 SECONDS (ref 26–38)
AST SERPL W P-5'-P-CCNC: 25 U/L (ref 5–45)
BASOPHILS # BLD AUTO: 0.03 THOUSANDS/ΜL (ref 0–0.1)
BASOPHILS NFR BLD AUTO: 0 % (ref 0–1)
BILIRUB DIRECT SERPL-MCNC: 0.15 MG/DL (ref 0–0.2)
BILIRUB SERPL-MCNC: 0.7 MG/DL (ref 0.2–1)
BUN SERPL-MCNC: 12 MG/DL (ref 5–25)
CALCIUM SERPL-MCNC: 9.3 MG/DL (ref 8.3–10.1)
CHLORIDE SERPL-SCNC: 104 MMOL/L (ref 100–108)
CO2 SERPL-SCNC: 29 MMOL/L (ref 21–32)
CREAT SERPL-MCNC: 0.86 MG/DL (ref 0.6–1.3)
EOSINOPHIL # BLD AUTO: 0.27 THOUSAND/ΜL (ref 0–0.61)
EOSINOPHIL NFR BLD AUTO: 3 % (ref 0–6)
ERYTHROCYTE [DISTWIDTH] IN BLOOD BY AUTOMATED COUNT: 13 % (ref 11.6–15.1)
GFR SERPL CREATININE-BSD FRML MDRD: 115 ML/MIN/1.73SQ M
GLUCOSE SERPL-MCNC: 112 MG/DL (ref 65–140)
HCT VFR BLD AUTO: 44.6 % (ref 36.5–49.3)
HGB BLD-MCNC: 15.4 G/DL (ref 12–17)
IMM GRANULOCYTES # BLD AUTO: 0.02 THOUSAND/UL (ref 0–0.2)
IMM GRANULOCYTES NFR BLD AUTO: 0 % (ref 0–2)
INR PPP: 0.98 (ref 0.86–1.17)
LACTATE SERPL-SCNC: 1 MMOL/L (ref 0.5–2)
LIPASE SERPL-CCNC: 89 U/L (ref 73–393)
LYMPHOCYTES # BLD AUTO: 2.09 THOUSANDS/ΜL (ref 0.6–4.47)
LYMPHOCYTES NFR BLD AUTO: 23 % (ref 14–44)
MCH RBC QN AUTO: 32 PG (ref 26.8–34.3)
MCHC RBC AUTO-ENTMCNC: 34.5 G/DL (ref 31.4–37.4)
MCV RBC AUTO: 93 FL (ref 82–98)
MONOCYTES # BLD AUTO: 0.63 THOUSAND/ΜL (ref 0.17–1.22)
MONOCYTES NFR BLD AUTO: 7 % (ref 4–12)
NEUTROPHILS # BLD AUTO: 6.03 THOUSANDS/ΜL (ref 1.85–7.62)
NEUTS SEG NFR BLD AUTO: 67 % (ref 43–75)
NRBC BLD AUTO-RTO: 0 /100 WBCS
PLATELET # BLD AUTO: 252 THOUSANDS/UL (ref 149–390)
PMV BLD AUTO: 9.2 FL (ref 8.9–12.7)
POTASSIUM SERPL-SCNC: 3.9 MMOL/L (ref 3.5–5.3)
PROT SERPL-MCNC: 7.5 G/DL (ref 6.4–8.2)
PROTHROMBIN TIME: 12.9 SECONDS (ref 11.8–14.2)
RBC # BLD AUTO: 4.81 MILLION/UL (ref 3.88–5.62)
SODIUM SERPL-SCNC: 142 MMOL/L (ref 136–145)
WBC # BLD AUTO: 9.07 THOUSAND/UL (ref 4.31–10.16)

## 2019-03-12 PROCEDURE — 85025 COMPLETE CBC W/AUTO DIFF WBC: CPT | Performed by: PHYSICIAN ASSISTANT

## 2019-03-12 PROCEDURE — 83605 ASSAY OF LACTIC ACID: CPT | Performed by: PHYSICIAN ASSISTANT

## 2019-03-12 PROCEDURE — 96361 HYDRATE IV INFUSION ADD-ON: CPT

## 2019-03-12 PROCEDURE — 99285 EMERGENCY DEPT VISIT HI MDM: CPT

## 2019-03-12 PROCEDURE — 96375 TX/PRO/DX INJ NEW DRUG ADDON: CPT

## 2019-03-12 PROCEDURE — 83690 ASSAY OF LIPASE: CPT | Performed by: PHYSICIAN ASSISTANT

## 2019-03-12 PROCEDURE — 80076 HEPATIC FUNCTION PANEL: CPT | Performed by: PHYSICIAN ASSISTANT

## 2019-03-12 PROCEDURE — 96374 THER/PROPH/DIAG INJ IV PUSH: CPT

## 2019-03-12 PROCEDURE — 36415 COLL VENOUS BLD VENIPUNCTURE: CPT | Performed by: PHYSICIAN ASSISTANT

## 2019-03-12 PROCEDURE — 99222 1ST HOSP IP/OBS MODERATE 55: CPT | Performed by: INTERNAL MEDICINE

## 2019-03-12 PROCEDURE — 74177 CT ABD & PELVIS W/CONTRAST: CPT

## 2019-03-12 PROCEDURE — 85730 THROMBOPLASTIN TIME PARTIAL: CPT | Performed by: PHYSICIAN ASSISTANT

## 2019-03-12 PROCEDURE — 85610 PROTHROMBIN TIME: CPT | Performed by: PHYSICIAN ASSISTANT

## 2019-03-12 PROCEDURE — 80048 BASIC METABOLIC PNL TOTAL CA: CPT | Performed by: PHYSICIAN ASSISTANT

## 2019-03-12 RX ORDER — FAMOTIDINE 20 MG/1
40 TABLET, FILM COATED ORAL
Status: DISCONTINUED | OUTPATIENT
Start: 2019-03-12 | End: 2019-03-14 | Stop reason: HOSPADM

## 2019-03-12 RX ORDER — SODIUM CHLORIDE 9 MG/ML
125 INJECTION, SOLUTION INTRAVENOUS ONCE
Status: COMPLETED | OUTPATIENT
Start: 2019-03-12 | End: 2019-03-12

## 2019-03-12 RX ORDER — HYDROMORPHONE HCL/PF 1 MG/ML
0.5 SYRINGE (ML) INJECTION ONCE
Status: COMPLETED | OUTPATIENT
Start: 2019-03-12 | End: 2019-03-12

## 2019-03-12 RX ORDER — MORPHINE SULFATE 4 MG/ML
4 INJECTION, SOLUTION INTRAMUSCULAR; INTRAVENOUS EVERY 4 HOURS PRN
Status: DISCONTINUED | OUTPATIENT
Start: 2019-03-12 | End: 2019-03-14 | Stop reason: HOSPADM

## 2019-03-12 RX ORDER — PANTOPRAZOLE SODIUM 40 MG/1
40 TABLET, DELAYED RELEASE ORAL
Status: DISCONTINUED | OUTPATIENT
Start: 2019-03-13 | End: 2019-03-14 | Stop reason: HOSPADM

## 2019-03-12 RX ORDER — ONDANSETRON 2 MG/ML
4 INJECTION INTRAMUSCULAR; INTRAVENOUS ONCE
Status: COMPLETED | OUTPATIENT
Start: 2019-03-12 | End: 2019-03-12

## 2019-03-12 RX ORDER — DICYCLOMINE HCL 20 MG
20 TABLET ORAL EVERY 6 HOURS
Status: DISCONTINUED | OUTPATIENT
Start: 2019-03-12 | End: 2019-03-14 | Stop reason: HOSPADM

## 2019-03-12 RX ORDER — ACETAMINOPHEN 325 MG/1
650 TABLET ORAL EVERY 6 HOURS PRN
Status: DISCONTINUED | OUTPATIENT
Start: 2019-03-12 | End: 2019-03-14 | Stop reason: HOSPADM

## 2019-03-12 RX ORDER — ONDANSETRON 2 MG/ML
4 INJECTION INTRAMUSCULAR; INTRAVENOUS EVERY 6 HOURS PRN
Status: DISCONTINUED | OUTPATIENT
Start: 2019-03-12 | End: 2019-03-14 | Stop reason: HOSPADM

## 2019-03-12 RX ADMIN — PIPERACILLIN SODIUM,TAZOBACTAM SODIUM 3.38 G: 3; .375 INJECTION, POWDER, FOR SOLUTION INTRAVENOUS at 19:16

## 2019-03-12 RX ADMIN — SODIUM CHLORIDE 2730 ML: 0.9 INJECTION, SOLUTION INTRAVENOUS at 15:24

## 2019-03-12 RX ADMIN — SODIUM CHLORIDE 125 ML/HR: 0.9 INJECTION, SOLUTION INTRAVENOUS at 20:17

## 2019-03-12 RX ADMIN — HYDROMORPHONE HYDROCHLORIDE 0.5 MG: 1 INJECTION, SOLUTION INTRAMUSCULAR; INTRAVENOUS; SUBCUTANEOUS at 15:19

## 2019-03-12 RX ADMIN — FAMOTIDINE 40 MG: 20 TABLET ORAL at 22:45

## 2019-03-12 RX ADMIN — IOHEXOL 50 ML: 240 INJECTION, SOLUTION INTRATHECAL; INTRAVASCULAR; INTRAVENOUS; ORAL at 16:49

## 2019-03-12 RX ADMIN — ONDANSETRON 4 MG: 2 INJECTION INTRAMUSCULAR; INTRAVENOUS at 15:16

## 2019-03-12 RX ADMIN — MORPHINE SULFATE 4 MG: 4 INJECTION INTRAVENOUS at 23:56

## 2019-03-12 RX ADMIN — IOHEXOL 100 ML: 350 INJECTION, SOLUTION INTRAVENOUS at 17:08

## 2019-03-12 RX ADMIN — DICYCLOMINE HYDROCHLORIDE 20 MG: 20 TABLET ORAL at 20:13

## 2019-03-12 RX ADMIN — ONDANSETRON 4 MG: 2 INJECTION INTRAMUSCULAR; INTRAVENOUS at 22:47

## 2019-03-12 RX ADMIN — ACETAMINOPHEN 650 MG: 325 TABLET, FILM COATED ORAL at 22:45

## 2019-03-12 NOTE — PLAN OF CARE
Problem: PAIN - ADULT  Goal: Verbalizes/displays adequate comfort level or baseline comfort level  Description  Interventions:  - Encourage patient to monitor pain and request assistance  - Assess pain using appropriate pain scale  - Administer analgesics based on type and severity of pain and evaluate response  - Implement non-pharmacological measures as appropriate and evaluate response  - Consider cultural and social influences on pain and pain management  - Notify physician/advanced practitioner if interventions unsuccessful or patient reports new pain  Outcome: Progressing     Problem: INFECTION - ADULT  Goal: Absence or prevention of progression during hospitalization  Description  INTERVENTIONS:  - Assess and monitor for signs and symptoms of infection  - Monitor lab/diagnostic results  - Monitor all insertion sites, i e  indwelling lines, tubes, and drains  - Monitor endotracheal (as able) and nasal secretions for changes in amount and color  - [place] appropriate cooling/warming therapies per order  - Administer medications as ordered  - Instruct and encourage patient and family to use good hand hygiene technique  - Identify and instruct in appropriate isolation precautions for identified infection/condition  Outcome: Progressing  Goal: Absence of fever/infection during neutropenic period  Description  INTERVENTIONS:  - Monitor WBC  - Implement neutropenic guidelines  Outcome: Progressing     Problem: SAFETY ADULT  Goal: Patient will remain free of falls  Description  INTERVENTIONS:  - Assess patient frequently for physical needs  -  Identify cognitive and physical deficits and behaviors that affect risk of falls    -  [place] fall precautions as indicated by assessment   - Educate patient/family on patient safety including physical limitations  - Instruct patient to call for assistance with activity based on assessment  - Modify environment to reduce risk of injury  - Consider OT/PT consult to assist with strengthening/mobility  Outcome: Progressing  Goal: Maintain or return to baseline ADL function  Description  INTERVENTIONS:  -  Assess patient's ability to carry out ADLs; assess patient's baseline for ADL function and identify physical deficits which impact ability to perform ADLs (bathing, care of mouth/teeth, toileting, grooming, dressing, etc )  - Assess/evaluate cause of self-care deficits   - Assess range of motion  - Assess patient's mobility; develop plan if impaired  - Assess patient's need for assistive devices and provide as appropriate  - Encourage maximum independence but intervene and supervise when necessary  ¯ Involve family in performance of ADLs  ¯ Assess for home care needs following discharge   ¯ Request OT consult to assist with ADL evaluation and planning for discharge  ¯ Provide patient education as appropriate  Outcome: Progressing  Goal: Maintain or return mobility status to optimal level  Description  INTERVENTIONS:  - Assess patient's baseline mobility status (ambulation, transfers, stairs, etc )    - Identify cognitive and physical deficits and behaviors that affect mobility  - Identify mobility aids required to assist with transfers and/or ambulation (gait belt, sit-to-stand, lift, walker, cane, etc )  - [place] fall precautions as indicated by assessment  - Record patient progress and toleration of activity level on Mobility SBAR; progress patient to next Phase/Stage  - Instruct patient to call for assistance with activity based on assessment  - Request Rehabilitation consult to assist with strengthening/weightbearing, etc   Outcome: Progressing     Problem: DISCHARGE PLANNING  Goal: Discharge to home or other facility with appropriate resources  Description  INTERVENTIONS:  - Identify barriers to discharge w/patient and caregiver  - Arrange for needed discharge resources and transportation as appropriate  - Identify discharge learning needs (meds, wound care, etc )  - Arrange for interpretive services to assist at discharge as needed  - Refer to Case Management Department for coordinating discharge planning if the patient needs post-hospital services based on physician/advanced practitioner order or complex needs related to functional status, cognitive ability, or social support system  Outcome: Progressing     Problem: Knowledge Deficit  Goal: Patient/family/caregiver demonstrates understanding of disease process, treatment plan, medications, and discharge instructions  Description  Complete learning assessment and assess knowledge base    Interventions:  - Provide teaching at level of understanding  - Provide teaching via preferred learning methods  Outcome: Progressing

## 2019-03-12 NOTE — H&P
History and Physical - [facility]    Patient Information: Mica Lebron 29 y o  male MRN: [id number]  Unit/Bed#: ED 07 Encounter: [id number]  Admitting Physician: Tara Marshall MD  PCP: Woodson Shone, DO  Date of Admission:  03/12/19    Assessment/Plan:    Hospital Problem List:     Principal Problem:    Anal fistula  Active Problems:    Internal hemorrhoids    Nausea    History of rectal abscess    Nausea and vomiting    Anal abscess      Plan for the Primary Problem(s):  · 1  Perianal fistula- patient states that there has been purulent discharge for the past two days  Patient afebrile, no leukocytosis but will place on IV zosyn  CT abdomen did not show any collection to indicate abscess  GI and surgery to evaluate  · 2  GERD- on omeprazole  ·     Plan for Additional Problems:   ·     VTE Prophylaxis: Enoxaparin (Lovenox)  / sequential compression device   Code Status: Full  POLST: There is no POLST form on file for this patient (pre-hospital)    Anticipated Length of Stay:  Patient will be admitted on an Inpatient basis with an anticipated length of stay of  more 2 midnights  Justification for Hospital Stay: perianal fistula    Total Time for Visit, including Counseling / Coordination of Care: 30 minutes  Greater than 50% of this total time spent on direct patient counseling and coordination of care  Chief Complaint:   Discharge from perianal fistula    History of Present Illness:    Mica Lebron is a 29 y o  male who presents with pmhx of 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 years comes with complaints of increased purulent discharge from perianal fistula  Patient also complaints of chills  He states that this feel like another one of this abscesses  He states that now it is so painful that he cannot lay on his backside  Patient also has some nausea and vomiting  He will be admitted for further evaluation      Review of Systems:    Review of Systems Constitutional: Negative  HENT: Negative  Respiratory: Negative  Cardiovascular: Negative  Gastrointestinal: Positive for anal bleeding, nausea, rectal pain and vomiting  Negative for abdominal distention, abdominal pain, blood in stool, constipation and diarrhea  Genitourinary: Negative  Musculoskeletal: Negative  Skin: Negative  Neurological: Negative  Past Medical and Surgical History:     History reviewed  No pertinent past medical history  Past Surgical History:   Procedure Laterality Date    ABCESS DRAINAGE  10/29/2018    carlos anal    LA COLONOSCOPY FLX DX W/COLLJ SPEC WHEN PFRMD N/A 11/21/2018    Procedure: COLONOSCOPY;  Surgeon: Abby Batista MD;  Location: [facility]; Service: Gastroenterology    LA ESOPHAGOGASTRODUODENOSCOPY TRANSORAL DIAGNOSTIC N/A 11/21/2018    Procedure: ESOPHAGOGASTRODUODENOSCOPY (EGD); Surgeon: Abby Batista MD;  Location: [facility]; Service: Gastroenterology    LA ESOPHAGOGASTRODUODENOSCOPY TRANSORAL DIAGNOSTIC N/A 2/7/2019    Procedure: ESOPHAGOGASTRODUODENOSCOPY (EGD); Surgeon: Abby Batista MD;  Location: [facility]; Service: Gastroenterology       Meds/Allergies:    Prior to Admission medications    Medication Sig Start Date End Date Taking?  Authorizing Provider   aluminum hydroxide-magnesium carbonate (GAVISCON)  mg/15 mL oral suspension Take 15 mL by mouth 4 (four) times daily (after meals and at bedtime) 2/6/19   GORDY Kerr   dicyclomine (BENTYL) 20 mg tablet Take 1 tablet (20 mg total) by mouth every 6 (six) hours As needed for abdominal cramping 2/5/19   Belkis Brewer DO   famotidine (PEPCID) 40 MG tablet Take 1 tablet (40 mg total) by mouth daily at bedtime 2/6/19   GORDY Kerr   naproxen (NAPROSYN) 500 mg tablet Take 1 tablet (500 mg total) by mouth 2 (two) times a day as needed for moderate pain for up to 7 days 1/17/19 1/24/19  Brandon Alba MD   omeprazole (PriLOSEC) 20 mg delayed release capsule Take 1 capsule (20 mg total) by mouth 2 (two) times a day before meals for 30 days 11/13/18 12/13/18  GORDY Lovell   omeprazole (PriLOSEC) 40 MG capsule Take 1 capsule (40 mg total) by mouth daily for 90 days 2/6/19 5/7/19  GORDY Lovell   ondansetron (ZOFRAN) 4 mg tablet Take 1 tablet (4 mg total) by mouth every 8 (eight) hours as needed for nausea or vomiting 2/6/19   GORDY Lovell   ondansetron (ZOFRAN-ODT) 4 mg disintegrating tablet Take 1 tablet (4 mg total) by mouth every 8 (eight) hours as needed for nausea or vomiting for up to 3 days 1/17/19 1/20/19  Trace Sanders MD   ondansetron (ZOFRAN-ODT) 4 mg disintegrating tablet Take 1 tablet (4 mg total) by mouth every 8 (eight) hours as needed for nausea or vomiting 2/5/19   Jeri Odonnell DO     I have reviewed home medications with patient family member  Allergies: No Known Allergies    Social History:     Marital Status: /Civil Union   Occupation:   Patient Pre-hospital Living Situation: home  Patient Pre-hospital Level of Mobility: walks  Patient Pre-hospital Diet Restrictions: none  Substance Use History:   Social History     Substance and Sexual Activity   Alcohol Use No     Social History     Tobacco Use   Smoking Status Current Every Day Smoker    Packs/day: 0 50   Smokeless Tobacco Never Used     Social History     Substance and Sexual Activity   Drug Use Not Currently    Frequency: 4 0 times per week    Types: Marijuana       Family History:    non-contributory    Physical Exam:     Vitals:   Blood Pressure: 117/60 (03/12/19 1637)  Pulse: 61 (03/12/19 1637)  Temperature: 97 5 °F (36 4 °C) (03/12/19 1401)  Temp Source: Oral (03/12/19 1401)  Respirations: 16 (03/12/19 1637)  Height: 5' 11" (180 3 cm) (03/12/19 1401)  Weight - Scale: 91 kg (200 lb 9 9 oz) (03/12/19 1401)  SpO2: 97 % (03/12/19 1637)    Physical Exam   Constitutional: He is oriented to person, place, and time   He appears well-developed and well-nourished  HENT:   Head: Normocephalic and atraumatic  Eyes: Pupils are equal, round, and reactive to light  EOM are normal    Neck: Normal range of motion  Neck supple  No tracheal deviation present  No thyromegaly present  Cardiovascular: Normal rate, regular rhythm and normal heart sounds  Exam reveals no gallop and no friction rub  No murmur heard  Pulmonary/Chest: Effort normal and breath sounds normal  No stridor  No respiratory distress  He has no wheezes  Abdominal: Soft  Bowel sounds are normal  He exhibits no distension and no mass  There is no tenderness  There is no guarding  Genitourinary:   Genitourinary Comments: Perianal tenderness  No purulent discharge seen   Musculoskeletal: Normal range of motion  Neurological: He is alert and oriented to person, place, and time  Skin: Skin is warm and dry  Vitals reviewed  Additional Data:     Lab Results: I have personally reviewed pertinent reports  Results from last 7 days   Lab Units 03/12/19  1515   WBC Thousand/uL 9 07   HEMOGLOBIN g/dL 15 4   HEMATOCRIT % 44 6   PLATELETS Thousands/uL 252   NEUTROS PCT % 67   LYMPHS PCT % 23   MONOS PCT % 7   EOS PCT % 3     Results from last 7 days   Lab Units 03/12/19  1515   POTASSIUM mmol/L 3 9   CHLORIDE mmol/L 104   CO2 mmol/L 29   BUN mg/dL 12   CREATININE mg/dL 0 86   CALCIUM mg/dL 9 3   ALK PHOS U/L 88   ALT U/L 41   AST U/L 25     Results from last 7 days   Lab Units 03/12/19  1515   INR  0 98       Imaging: I have personally reviewed pertinent reports  Ct Abdomen Pelvis With Contrast    Result Date: 3/12/2019  Narrative: CT ABDOMEN AND PELVIS WITH IV CONTRAST INDICATION:   ? crohn's disease/fistula   "29year old male with past medical history significant for perianal fistula, perianal abscess and dysphagia present to ED with chief complaint of increasing purulent rectal drainage, increasing rectal pain, and decreased urination    Onset of symptoms reported as 2 weeks ago  ""There is an area with fullness, tenderness to palpation and punctate opening present as 9 oclock position which appears consistent with fistula  No current drainage " COMPARISON:  CT abdomen pelvis 2/5/2019 TECHNIQUE:  CT examination of the abdomen and pelvis was performed  Axial, sagittal, and coronal 2D reformatted images were created from the source data and submitted for interpretation  Radiation dose length product (DLP) for this visit:  553 mGy-cm   This examination, like all CT scans performed in the [facility], was performed utilizing techniques to minimize radiation dose exposure, including the use of iterative reconstruction and automated exposure control  IV Contrast:  100 mL of iohexol (OMNIPAQUE) Enteric Contrast:  Enteric contrast was not administered  FINDINGS: ABDOMEN LOWER CHEST:  Small sliding-type hiatal hernia LIVER/BILIARY TREE:  Unremarkable  GALLBLADDER:  No calcified gallstones  No pericholecystic inflammatory change  SPLEEN:  Unremarkable  PANCREAS:  Unremarkable  ADRENAL GLANDS:  Unremarkable  KIDNEYS/URETERS:  Unremarkable  No hydronephrosis  STOMACH AND BOWEL:  Linear soft tissue density extending from the left side of the anal verge posteriorly into the medial left gluteal subcutaneous tissues in keeping with this patient's known fistula  Unchanged appearance when compared with the prior study from 2/5/2019  No collection to indicate an abscess  No terminal ileal thickening in this patient with a history of Crohn's disease  APPENDIX:  Noninflamed ABDOMINOPELVIC CAVITY:  No ascites or free intraperitoneal air  No lymphadenopathy  VESSELS:  Unremarkable for patient's age  PELVIS REPRODUCTIVE ORGANS:  Unremarkable for patient's age  URINARY BLADDER:  Unremarkable  ABDOMINAL WALL/INGUINAL REGIONS:  Left perianal abscess extending into the left gluteal soft tissues as described above in the stomach and bowel section   OSSEOUS STRUCTURES:  No acute fracture or destructive osseous lesion  Impression: Linear soft tissue density extending from the left side of the anal verge posteriorly into the medial left gluteal subcutaneous tissues in keeping with this patient's known fistula  Unchanged appearance when compared with the prior study from 2/5/2019  No discrete collection to indicate an abscess  Workstation performed: [id number]       EKG, Pathology, and Other Studies Reviewed on Admission:   · EKG:     Allscripts / Epic Records Reviewed: Yes     ** Please Note: This note has been constructed using a voice recognition system   **

## 2019-03-12 NOTE — ED NOTES
Patient requested to have IV contrast instead of drinking oral contrast  Provider states okay for IV contrast  CT scan notified        Reid Perez RN  03/12/19 9103

## 2019-03-12 NOTE — ED PROVIDER NOTES
History  Chief Complaint   Patient presents with    Abscess     pt has hx of anal fistula/abscess  pt states "I am just sick, it has to be infected or something " pt c/o green puss drainage  29year old male with past medical history significant for perianal fistula, perianal abscess and dysphagia present to ED with chief complaint of increasing purulent rectal drainage, increasing rectal pain, and decreased urination  Onset of symptoms reported as 2 days ago  Location of symptoms is reported as rectal area  Quality is reported as purulent/bloody rectal drainage similar to prior perianal abscesses  Severity is reported as moderate to severe  Associated symptoms: positive for fatigue, positive for purulent/bloody rectal drainage, positive for decreased urination, denies fevers, positive for chills, positive for nausea/vomiting  Modifiers: unknown  Context: patient with history of multiple perianal fistulas and abscesses in the past   Has seen [facility] regarding these issues  Underwent EGD by Dr Stanley Mcgee on 2/7/19 and 11/21/2017  Reports worsening symptoms including purulent rectal drainage, fatigue, chills and decreased urination for the past two days, similar to prior abscesses  History provided by:  Patient   used: No        Prior to Admission Medications   Prescriptions Last Dose Informant Patient Reported?  Taking?   aluminum hydroxide-magnesium carbonate (GAVISCON)  mg/15 mL oral suspension Not Taking at Unknown time  No No   Sig: Take 15 mL by mouth 4 (four) times daily (after meals and at bedtime)   Patient not taking: Reported on 3/12/2019   dicyclomine (BENTYL) 20 mg tablet Not Taking at Unknown time Self No No   Sig: Take 1 tablet (20 mg total) by mouth every 6 (six) hours As needed for abdominal cramping   Patient not taking: Reported on 3/12/2019   famotidine (PEPCID) 40 MG tablet Not Taking at Unknown time  No No   Sig: Take 1 tablet (40 mg total) by mouth daily at bedtime   Patient not taking: Reported on 3/12/2019   omeprazole (PriLOSEC) 20 mg delayed release capsule   No No   Sig: Take 1 capsule (20 mg total) by mouth 2 (two) times a day before meals for 30 days   omeprazole (PriLOSEC) 40 MG capsule Not Taking at Unknown time  No No   Sig: Take 1 capsule (40 mg total) by mouth daily for 90 days   Patient not taking: Reported on 3/12/2019   ondansetron (ZOFRAN) 4 mg tablet Not Taking at Unknown time  No No   Sig: Take 1 tablet (4 mg total) by mouth every 8 (eight) hours as needed for nausea or vomiting   Patient not taking: Reported on 3/12/2019   ondansetron (ZOFRAN-ODT) 4 mg disintegrating tablet   No No   Sig: Take 1 tablet (4 mg total) by mouth every 8 (eight) hours as needed for nausea or vomiting for up to 3 days   ondansetron (ZOFRAN-ODT) 4 mg disintegrating tablet Not Taking at Unknown time Self No No   Sig: Take 1 tablet (4 mg total) by mouth every 8 (eight) hours as needed for nausea or vomiting   Patient not taking: Reported on 3/12/2019      Facility-Administered Medications: None       History reviewed  No pertinent past medical history  Past Surgical History:   Procedure Laterality Date    ABCESS DRAINAGE  10/29/2018    carlos anal    ANAL FISTULOTOMY N/A 3/14/2019    Procedure: FISTULOTOMY;  Surgeon: Andre Boothe MD;  [facility];  Service: Colorectal    OH COLONOSCOPY FLX DX W/COLLJ SPEC WHEN PFRMD N/A 11/21/2018    Procedure: COLONOSCOPY;  Surgeon: Kim Dillard MD;  Location: [facility]; Service: Gastroenterology    OH ESOPHAGOGASTRODUODENOSCOPY TRANSORAL DIAGNOSTIC N/A 11/21/2018    Procedure: ESOPHAGOGASTRODUODENOSCOPY (EGD); Surgeon: Kim Dillard MD;  [facility]; Service: Gastroenterology    OH ESOPHAGOGASTRODUODENOSCOPY TRANSORAL DIAGNOSTIC N/A 2/7/2019    Procedure: ESOPHAGOGASTRODUODENOSCOPY (EGD); Surgeon: Kim Dillard MD;  [facility];   Service: Gastroenterology  TONSILLECTOMY         Family History   Problem Relation Age of Onset    Diabetes Father      I have reviewed and agree with the history as documented  Social History     Tobacco Use    Smoking status: Current Every Day Smoker     Packs/day: 0 50    Smokeless tobacco: Never Used   Substance Use Topics    Alcohol use: No    Drug use: Yes     Frequency: 2 0 times per week     Types: Marijuana        Review of Systems   Constitutional: Positive for appetite change, chills and fatigue  Negative for activity change, diaphoresis, fever and unexpected weight change  HENT: Negative for congestion, dental problem, drooling, ear discharge, ear pain, facial swelling, hearing loss, mouth sores, nosebleeds, postnasal drip, rhinorrhea, sinus pressure, sinus pain, sneezing, sore throat, tinnitus, trouble swallowing and voice change  Eyes: Negative for photophobia, pain, discharge, redness, itching and visual disturbance  Respiratory: Negative for cough, chest tightness, shortness of breath and wheezing  Cardiovascular: Negative for chest pain, palpitations and leg swelling  Gastrointestinal: Positive for abdominal pain, anal bleeding, blood in stool, diarrhea, nausea, rectal pain and vomiting  Negative for abdominal distention and constipation  Endocrine: Negative for cold intolerance, heat intolerance, polydipsia, polyphagia and polyuria  Genitourinary: Positive for decreased urine volume  Negative for difficulty urinating, dysuria, flank pain, frequency, hematuria and urgency  Musculoskeletal: Negative for arthralgias, back pain, gait problem, joint swelling, myalgias, neck pain and neck stiffness  Skin: Negative for color change, pallor, rash and wound  Allergic/Immunologic: Negative for environmental allergies, food allergies and immunocompromised state  Neurological: Positive for weakness   Negative for dizziness, tremors, seizures, syncope, facial asymmetry, speech difficulty, light-headedness, numbness and headaches  Hematological: Negative for adenopathy  Does not bruise/bleed easily  Psychiatric/Behavioral: Negative for agitation, confusion and hallucinations  The patient is not nervous/anxious  All other systems reviewed and are negative  Physical Exam  Physical Exam   Constitutional: He is oriented to person, place, and time  He appears well-developed and well-nourished  No distress  /85   Pulse 100   Temp 97 5 °F (36 4 °C) (Oral)   Resp 18   Ht 5' 11" (1 803 m)   Wt 91 kg (200 lb 9 9 oz)   SpO2 99%   BMI 27 98 kg/m²    HENT:   Head: Normocephalic and atraumatic  Right Ear: External ear normal    Left Ear: External ear normal    Nose: Nose normal    Mouth/Throat: Oropharynx is clear and moist  No oropharyngeal exudate  Eyes: Pupils are equal, round, and reactive to light  Conjunctivae and EOM are normal  Right eye exhibits no discharge  Left eye exhibits no discharge  No scleral icterus  Neck: Normal range of motion  Neck supple  No tracheal deviation present  No thyromegaly present  Cardiovascular: Normal rate, regular rhythm and intact distal pulses  Pulmonary/Chest: Effort normal and breath sounds normal  No stridor  No respiratory distress  He has no wheezes  He has no rales  He exhibits no tenderness  Abdominal: Soft  Bowel sounds are normal  He exhibits no distension and no mass  There is tenderness  There is no rebound and no guarding  There is an area with fullness, tenderness to palpation and punctate opening present as 9 oclock position which appears consistent with fistula  No current drainage  Musculoskeletal: Normal range of motion  He exhibits no edema, tenderness or deformity  Lymphadenopathy:     He has no cervical adenopathy  Neurological: He is alert and oriented to person, place, and time  He displays normal reflexes  No cranial nerve deficit or sensory deficit  He exhibits normal muscle tone   Coordination normal  Skin: Skin is warm and dry  Capillary refill takes less than 2 seconds  No rash noted  He is not diaphoretic  No erythema  No pallor  Psychiatric: He has a normal mood and affect  His behavior is normal  Judgment and thought content normal    Nursing note and vitals reviewed        Vital Signs  ED Triage Vitals   Temperature Pulse Respirations Blood Pressure SpO2   03/12/19 1401 03/12/19 1401 03/12/19 1401 03/12/19 1401 03/12/19 1401   97 5 °F (36 4 °C) 100 18 149/85 99 %      Temp Source Heart Rate Source Patient Position - Orthostatic VS BP Location FiO2 (%)   03/12/19 1401 03/12/19 1637 03/12/19 1637 03/12/19 1637 --   Oral Monitor Lying Left arm       Pain Score       03/12/19 1401       7           Vitals:    03/14/19 1000 03/14/19 1100 03/14/19 1200 03/14/19 1608   BP: (!) 89/54 (!) 87/52 113/59 117/55   Pulse: 59 59  69   Patient Position - Orthostatic VS: Lying   Lying       qSOFA     Row Name 03/14/19 1608 03/14/19 1200 03/14/19 1100 03/14/19 1000 03/14/19 0945    Altered mental status GCS < 15  --  --  --  --  --    Respiratory Rate > / =22  0  --  --  0  0    Systolic BP < / =752  0  0  1  1  0    Q Sofa Score  0  0  1  1  0    Row Name 03/14/19 0930 03/14/19 0915 03/14/19 0912 03/14/19 0808 03/14/19 0730    Altered mental status GCS < 15  --  --  --  --  0    Respiratory Rate > / =22  0  1  0  0  --    Systolic BP < / =836  0  0  0  0  --    Q Sofa Score  0  1  0  0  --    Row Name 03/14/19 0300 03/13/19 2300 03/13/19 2000 03/13/19 1544 03/13/19 0734    Altered mental status GCS < 15  0  0  0  --  0    Respiratory Rate > / =22  --  0  --  0  0    Systolic BP < / =513  --  0  --  0  0    Q Sofa Score  0  0  --  0  0    Row Name 03/12/19 2330 03/12/19 2315 03/12/19 2250 03/12/19 2023 03/12/19 1637    Altered mental status GCS < 15  0  --  --  0  --    Respiratory Rate > / =22  --  0  0  --  0    Systolic BP < / =280  --  0  0  --  0    Q Sofa Score  0  0  0  0  0    Row Name 03/12/19 3301 Altered mental status GCS < 15  --        Respiratory Rate > / =07  0        Systolic BP < / =209  0        Q Sofa Score  0              Visual Acuity  Visual Acuity      Most Recent Value   L Pupil Size (mm)  3   R Pupil Size (mm)  3   L Pupil Shape  Round   R Pupil Shape  Round          ED Medications  Medications   sodium chloride 0 9 % bolus 2,730 mL (0 mL/kg × 91 kg Intravenous Stopped 3/12/19 1917)   ondansetron (ZOFRAN) injection 4 mg (4 mg Intravenous Given 3/12/19 1516)   HYDROmorphone (DILAUDID) injection 0 5 mg (0 5 mg Intravenous Given 3/12/19 1519)   iohexol (OMNIPAQUE) 240 MG/ML solution 50 mL (50 mL Oral Given 3/12/19 1649)   iohexol (OMNIPAQUE) 350 MG/ML injection (MULTI-DOSE) 100 mL (100 mL Intravenous Given 3/12/19 1708)   sodium chloride 0 9 % infusion (125 mL/hr Intravenous New Bag 3/12/19 2017)   midazolam (VERSED) injection 2 mg (2 mg Intravenous Given 3/14/19 0828)       Diagnostic Studies  Results Reviewed     Procedure Component Value Units Date/Time    Basic metabolic panel [[id number]] Collected:  03/13/19 0535    Lab Status:  Final result Specimen:  Blood from Line, Venous Updated:  03/13/19 0557     Sodium 143 mmol/L      Potassium 3 9 mmol/L      Chloride 106 mmol/L      CO2 28 mmol/L      ANION GAP 9 mmol/L      BUN 12 mg/dL      Creatinine 1 01 mg/dL      Glucose 86 mg/dL      Calcium 8 8 mg/dL      eGFR 98 ml/min/1 73sq m     Narrative:       National Kidney Disease Education Program recommendations are as follows:  GFR calculation is accurate only with a steady state creatinine  Chronic Kidney disease less than 60 ml/min/1 73 sq  meters  Kidney failure less than 15 ml/min/1 73 sq  meters      CBC (With Platelets) [[id number]]  (Normal) Collected:  03/13/19 0535    Lab Status:  Final result Specimen:  Blood from Arm, Left Updated:  03/13/19 0543     WBC 8 31 Thousand/uL      RBC 4 36 Million/uL      Hemoglobin 14 0 g/dL      Hematocrit 41 0 %      MCV 94 fL      MCH 32 1 pg      MCHC 34 1 g/dL      RDW 12 9 %      Platelets 941 Thousands/uL      MPV 9 1 fL     Lactic acid, plasma [[id number]]  (Normal) Collected:  03/12/19 1515    Lab Status:  Final result Specimen:  Blood from Arm, Right Updated:  03/12/19 1554     LACTIC ACID 1 0 mmol/L     Narrative:       Result may be elevated if tourniquet was used during collection  Basic metabolic panel [[id number]] Collected:  03/12/19 1515    Lab Status:  Final result Specimen:  Blood from Arm, Right Updated:  03/12/19 1547     Sodium 142 mmol/L      Potassium 3 9 mmol/L      Chloride 104 mmol/L      CO2 29 mmol/L      ANION GAP 9 mmol/L      BUN 12 mg/dL      Creatinine 0 86 mg/dL      Glucose 112 mg/dL      Calcium 9 3 mg/dL      eGFR 115 ml/min/1 73sq m     Narrative:       National Kidney Disease Education Program recommendations are as follows:  GFR calculation is accurate only with a steady state creatinine  Chronic Kidney disease less than 60 ml/min/1 73 sq  meters  Kidney failure less than 15 ml/min/1 73 sq  meters      Hepatic function panel [[id number]]  (Normal) Collected:  03/12/19 1515    Lab Status:  Final result Specimen:  Blood from Arm, Right Updated:  03/12/19 1547     Total Bilirubin 0 70 mg/dL      Bilirubin, Direct 0 15 mg/dL      Alkaline Phosphatase 88 U/L      AST 25 U/L      ALT 41 U/L      Total Protein 7 5 g/dL      Albumin 4 0 g/dL     Lipase [[id number]]  (Normal) Collected:  03/12/19 1515    Lab Status:  Final result Specimen:  Blood from Arm, Right Updated:  03/12/19 1547     Lipase 89 u/L     Protime-INR [[id number]]  (Normal) Collected:  03/12/19 1515    Lab Status:  Final result Specimen:  Blood from Arm, Right Updated:  03/12/19 1543     Protime 12 9 seconds      INR 0 98    APTT [[id number]]  (Normal) Collected:  03/12/19 1515    Lab Status:  Final result Specimen:  Blood from Arm, Right Updated:  03/12/19 1543     PTT 30 seconds     CBC and differential [[id number]] Collected:  03/12/19 1515    Lab Status:  Final result Specimen:  Blood from Arm, Right Updated:  03/12/19 1528     WBC 9 07 Thousand/uL      RBC 4 81 Million/uL      Hemoglobin 15 4 g/dL      Hematocrit 44 6 %      MCV 93 fL      MCH 32 0 pg      MCHC 34 5 g/dL      RDW 13 0 %      MPV 9 2 fL      Platelets 934 Thousands/uL      nRBC 0 /100 WBCs      Neutrophils Relative 67 %      Immat GRANS % 0 %      Lymphocytes Relative 23 %      Monocytes Relative 7 %      Eosinophils Relative 3 %      Basophils Relative 0 %      Neutrophils Absolute 6 03 Thousands/µL      Immature Grans Absolute 0 02 Thousand/uL      Lymphocytes Absolute 2 09 Thousands/µL      Monocytes Absolute 0 63 Thousand/µL      Eosinophils Absolute 0 27 Thousand/µL      Basophils Absolute 0 03 Thousands/µL                  CT abdomen pelvis with contrast   Final Result by Lana Li MD (03/12 9294)      Linear soft tissue density extending from the left side of the anal verge posteriorly into the medial left gluteal subcutaneous tissues in keeping with this patient's known fistula  Unchanged appearance when compared with the prior study from 2/5/2019  No discrete collection to indicate an abscess  Workstation performed: [id number]                    Procedures  Procedures       Phone Contacts  ED Phone Contact    ED Course                               MDM  Number of Diagnoses or Management Options  Perianal fistula: new and requires workup  Diagnosis management comments: ddx includes but is not limited to perirectal abscess, perianal abscess, perianal/rectal fistula, crohns disease, UC, colitis, diverticulitis  Plan workup including ct abd/pelvis - with po/iv contrast, labs, iv fluids  Decreasing urination concerning for possible fistula  Lab results reviewed:  CBC demonstrates normal white blood cell count 9 0  Hemoglobin of 15 4 and hematocrit of 44 6 are normal   No anemia  INR normal at 0 98  Basic metabolic panel was reviewed    BUN of 12, creatinine of 0 8, glucose of 112 normal   Hepatic function panel reviewed  AST of 25 and ALT of 41 are normal   Lipase normal at 89  Lactic acid normal at 1 0     CT abdomen pelvis images visualized by me  Radiology report was reviewed:ABDOMEN    LOWER CHEST:  Small sliding-type hiatal hernia    LIVER/BILIARY TREE:  Unremarkable  GALLBLADDER:  No calcified gallstones  No pericholecystic inflammatory change  SPLEEN:  Unremarkable  PANCREAS:  Unremarkable  ADRENAL GLANDS:  Unremarkable  KIDNEYS/URETERS:  Unremarkable  No hydronephrosis  STOMACH AND BOWEL:  Linear soft tissue density extending from the left side of the anal verge posteriorly into the medial left gluteal subcutaneous tissues in keeping with this patient's known fistula  Unchanged appearance when compared with the prior   study from 2/5/2019  No collection to indicate an abscess  No terminal ileal thickening in this patient with a history of Crohn's disease  APPENDIX:  Noninflamed    ABDOMINOPELVIC CAVITY:  No ascites or free intraperitoneal air  No lymphadenopathy  VESSELS:  Unremarkable for patient's age  PELVIS    REPRODUCTIVE ORGANS:  Unremarkable for patient's age  URINARY BLADDER:  Unremarkable  ABDOMINAL WALL/INGUINAL REGIONS:  Left perianal abscess extending into the left gluteal soft tissues as described above in the stomach and bowel section  OSSEOUS STRUCTURES:  No acute fracture or destructive osseous lesion  ED course:  A 52-year-old male with episode of perianal fistula  Patient has had a history of at least 2 of these in the past   Moved to [place] within the last 2 years and has been undergoing workup with local gastroenterologist for symptoms  He has only had 2 upper endoscopies  He has not had a colonoscopy yet  He reports they have not been able to establish the diagnosis of Crohn's disease thus he is not on a biologic so for treatment    He reports symptoms including purulent and bloody drainage from the fistula which has increased over the past 2 days and is concerning for recurrence of symptoms  He had been recommended to see a colorectal surgeon, Dr Jonathan Holm, but has not had the appointment yet  He reports nausea and vomiting in addition to the purulent fistula drainage  Discussed patient with GI Dr Carissa Garcia who suggests patient should be seen by surgery  Case discussed with Dr Jamila OVIEDO regarding admission for perianal fistula in setting of inflammatory bowel disease  Amount and/or Complexity of Data Reviewed  Clinical lab tests: ordered and reviewed  Tests in the radiology section of CPT®: ordered and reviewed  Discussion of test results with the performing providers: yes  Obtain history from someone other than the patient: yes (spouse)  Review and summarize past medical records: yes  Discuss the patient with other providers: yes  Independent visualization of images, tracings, or specimens: yes    Patient Progress  Patient progress: stable      Disposition  Final diagnoses:   Perianal fistula     Time reflects when diagnosis was documented in both MDM as applicable and the Disposition within this note     Time User Action Codes Description Comment    3/12/2019  6:18 PM Scheryl Reasons Add [K60 3] Perianal fistula     3/14/2019  4:17 PM Phong Rosas 82 [K60 3] Anal fistula       ED Disposition     ED Disposition Condition Date/Time Comment    Admit Stable Tue Mar 12, 2019  6:18 PM Case was discussed with Dr Jamila Nagy and the patient's admission status was agreed to be Admission Status: inpatient status to the service of Dr Jamila Nagy           Follow-up Information     Follow up With Specialties Details Why [address], DO Internal Medicine Follow up in 3 day(s)  [address]      Debrah Crigler, MD Colon and Rectal Surgery Follow up in 1 week(s)  [address]  [address] [address]  [phone] Mikael Rodríguez MD Gastroenterology Follow up in 2 week(s)  [address] [facility] Davin Gottlieblaura [address] [place] [address]  [phone]            Discharge Medication List as of 3/14/2019  4:01 PM      STOP taking these medications       aluminum hydroxide-magnesium carbonate (GAVISCON)  mg/15 mL oral suspension Comments:   Reason for Stopping:         dicyclomine (BENTYL) 20 mg tablet Comments:   Reason for Stopping:         famotidine (PEPCID) 40 MG tablet Comments:   Reason for Stopping:         omeprazole (PriLOSEC) 20 mg delayed release capsule Comments:   Reason for Stopping:         omeprazole (PriLOSEC) 40 MG capsule Comments:   Reason for Stopping:         ondansetron (ZOFRAN) 4 mg tablet Comments:   Reason for Stopping:         ondansetron (ZOFRAN-ODT) 4 mg disintegrating tablet Comments:   Reason for Stopping:         ondansetron (ZOFRAN-ODT) 4 mg disintegrating tablet Comments:   Reason for Stopping:             Outpatient Discharge Orders   Activity as tolerated     Call provider for:  severe uncontrolled pain       ED Provider  Electronically Signed by           Vanessa Costa PA-C  03/16/19 0480

## 2019-03-13 LAB
ANION GAP SERPL CALCULATED.3IONS-SCNC: 9 MMOL/L (ref 4–13)
BUN SERPL-MCNC: 12 MG/DL (ref 5–25)
CALCIUM SERPL-MCNC: 8.8 MG/DL (ref 8.3–10.1)
CHLORIDE SERPL-SCNC: 106 MMOL/L (ref 100–108)
CO2 SERPL-SCNC: 28 MMOL/L (ref 21–32)
CREAT SERPL-MCNC: 1.01 MG/DL (ref 0.6–1.3)
ERYTHROCYTE [DISTWIDTH] IN BLOOD BY AUTOMATED COUNT: 12.9 % (ref 11.6–15.1)
GFR SERPL CREATININE-BSD FRML MDRD: 98 ML/MIN/1.73SQ M
GLUCOSE SERPL-MCNC: 86 MG/DL (ref 65–140)
HCT VFR BLD AUTO: 41 % (ref 36.5–49.3)
HGB BLD-MCNC: 14 G/DL (ref 12–17)
MCH RBC QN AUTO: 32.1 PG (ref 26.8–34.3)
MCHC RBC AUTO-ENTMCNC: 34.1 G/DL (ref 31.4–37.4)
MCV RBC AUTO: 94 FL (ref 82–98)
PLATELET # BLD AUTO: 240 THOUSANDS/UL (ref 149–390)
PMV BLD AUTO: 9.1 FL (ref 8.9–12.7)
POTASSIUM SERPL-SCNC: 3.9 MMOL/L (ref 3.5–5.3)
RBC # BLD AUTO: 4.36 MILLION/UL (ref 3.88–5.62)
SODIUM SERPL-SCNC: 143 MMOL/L (ref 136–145)
WBC # BLD AUTO: 8.31 THOUSAND/UL (ref 4.31–10.16)

## 2019-03-13 PROCEDURE — 99253 IP/OBS CNSLTJ NEW/EST LOW 45: CPT | Performed by: INTERNAL MEDICINE

## 2019-03-13 PROCEDURE — 80048 BASIC METABOLIC PNL TOTAL CA: CPT | Performed by: INTERNAL MEDICINE

## 2019-03-13 PROCEDURE — 99232 SBSQ HOSP IP/OBS MODERATE 35: CPT | Performed by: STUDENT IN AN ORGANIZED HEALTH CARE EDUCATION/TRAINING PROGRAM

## 2019-03-13 PROCEDURE — 85027 COMPLETE CBC AUTOMATED: CPT | Performed by: INTERNAL MEDICINE

## 2019-03-13 RX ORDER — SODIUM CHLORIDE, SODIUM LACTATE, POTASSIUM CHLORIDE, CALCIUM CHLORIDE 600; 310; 30; 20 MG/100ML; MG/100ML; MG/100ML; MG/100ML
100 INJECTION, SOLUTION INTRAVENOUS CONTINUOUS
Status: DISCONTINUED | OUTPATIENT
Start: 2019-03-14 | End: 2019-03-14 | Stop reason: HOSPADM

## 2019-03-13 RX ADMIN — PIPERACILLIN SODIUM,TAZOBACTAM SODIUM 3.38 G: 3; .375 INJECTION, POWDER, FOR SOLUTION INTRAVENOUS at 17:50

## 2019-03-13 RX ADMIN — PIPERACILLIN SODIUM,TAZOBACTAM SODIUM 3.38 G: 3; .375 INJECTION, POWDER, FOR SOLUTION INTRAVENOUS at 11:56

## 2019-03-13 RX ADMIN — PIPERACILLIN SODIUM,TAZOBACTAM SODIUM 3.38 G: 3; .375 INJECTION, POWDER, FOR SOLUTION INTRAVENOUS at 00:50

## 2019-03-13 RX ADMIN — DICYCLOMINE HYDROCHLORIDE 20 MG: 20 TABLET ORAL at 07:39

## 2019-03-13 RX ADMIN — DICYCLOMINE HYDROCHLORIDE 20 MG: 20 TABLET ORAL at 21:46

## 2019-03-13 RX ADMIN — PIPERACILLIN SODIUM,TAZOBACTAM SODIUM 3.38 G: 3; .375 INJECTION, POWDER, FOR SOLUTION INTRAVENOUS at 06:53

## 2019-03-13 RX ADMIN — DICYCLOMINE HYDROCHLORIDE 20 MG: 20 TABLET ORAL at 02:26

## 2019-03-13 RX ADMIN — FAMOTIDINE 40 MG: 20 TABLET ORAL at 21:49

## 2019-03-13 RX ADMIN — DICYCLOMINE HYDROCHLORIDE 20 MG: 20 TABLET ORAL at 13:10

## 2019-03-13 NOTE — PLAN OF CARE
Problem: PAIN - ADULT  Goal: Verbalizes/displays adequate comfort level or baseline comfort level  Description  Interventions:  - Encourage patient to monitor pain and request assistance  - Assess pain using appropriate pain scale  - Administer analgesics based on type and severity of pain and evaluate response  - Implement non-pharmacological measures as appropriate and evaluate response  - Consider cultural and social influences on pain and pain management  - Notify physician/advanced practitioner if interventions unsuccessful or patient reports new pain  Outcome: Progressing     Problem: INFECTION - ADULT  Goal: Absence or prevention of progression during hospitalization  Description  INTERVENTIONS:  - Assess and monitor for signs and symptoms of infection  - Monitor lab/diagnostic results  - Monitor all insertion sites, i e  indwelling lines, tubes, and drains  - Monitor endotracheal (as able) and nasal secretions for changes in amount and color  - [place] appropriate cooling/warming therapies per order  - Administer medications as ordered  - Instruct and encourage patient and family to use good hand hygiene technique  - Identify and instruct in appropriate isolation precautions for identified infection/condition  Outcome: Progressing     Problem: SAFETY ADULT  Goal: Patient will remain free of falls  Description  INTERVENTIONS:  - Assess patient frequently for physical needs  -  Identify cognitive and physical deficits and behaviors that affect risk of falls    -  [place] fall precautions as indicated by assessment   - Educate patient/family on patient safety including physical limitations  - Instruct patient to call for assistance with activity based on assessment  - Modify environment to reduce risk of injury  - Consider OT/PT consult to assist with strengthening/mobility  Outcome: Progressing  Goal: Maintain or return to baseline ADL function  Description  INTERVENTIONS:  -  Assess patient's ability to carry out ADLs; assess patient's baseline for ADL function and identify physical deficits which impact ability to perform ADLs (bathing, care of mouth/teeth, toileting, grooming, dressing, etc )  - Assess/evaluate cause of self-care deficits   - Assess range of motion  - Assess patient's mobility; develop plan if impaired  - Assess patient's need for assistive devices and provide as appropriate  - Encourage maximum independence but intervene and supervise when necessary  ¯ Involve family in performance of ADLs  ¯ Assess for home care needs following discharge   ¯ Request OT consult to assist with ADL evaluation and planning for discharge  ¯ Provide patient education as appropriate  Outcome: Progressing  Goal: Maintain or return mobility status to optimal level  Description  INTERVENTIONS:  - Assess patient's baseline mobility status (ambulation, transfers, stairs, etc )    - Identify cognitive and physical deficits and behaviors that affect mobility  - Identify mobility aids required to assist with transfers and/or ambulation (gait belt, sit-to-stand, lift, walker, cane, etc )  - [place] fall precautions as indicated by assessment  - Record patient progress and toleration of activity level on Mobility SBAR; progress patient to next Phase/Stage  - Instruct patient to call for assistance with activity based on assessment  - Request Rehabilitation consult to assist with strengthening/weightbearing, etc   Outcome: Progressing     Problem: DISCHARGE PLANNING  Goal: Discharge to home or other facility with appropriate resources  Description  INTERVENTIONS:  - Identify barriers to discharge w/patient and caregiver  - Arrange for needed discharge resources and transportation as appropriate  - Identify discharge learning needs (meds, wound care, etc )  - Arrange for interpretive services to assist at discharge as needed  - Refer to Case Management Department for coordinating discharge planning if the patient needs post-hospital services based on physician/advanced practitioner order or complex needs related to functional status, cognitive ability, or social support system  Outcome: Progressing     Problem: Knowledge Deficit  Goal: Patient/family/caregiver demonstrates understanding of disease process, treatment plan, medications, and discharge instructions  Description  Complete learning assessment and assess knowledge base  Interventions:  - Provide teaching at level of understanding  - Provide teaching via preferred learning methods  Outcome: Progressing     Problem: Nutrition/Hydration-ADULT  Goal: Nutrient/Hydration intake appropriate for improving, restoring or maintaining nutritional needs  Description  Monitor and assess patient's nutrition/hydration status for malnutrition (ex- brittle hair, bruises, dry skin, pale skin and conjunctiva, muscle wasting, smooth red tongue, and disorientation)  Collaborate with interdisciplinary team and initiate plan and interventions as ordered  Monitor patient's weight and dietary intake as ordered or per policy  Utilize nutrition screening tool and intervene per policy  Determine patient's food preferences and provide high-protein, high-caloric foods as appropriate       INTERVENTIONS:  - Monitor oral intake, urinary output, labs, and treatment plans  - Assess nutrition and hydration status and recommend course of action  - Evaluate amount of meals eaten  - Assist patient with eating if necessary   - Allow adequate time for meals  - Recommend/ encourage appropriate diets, oral nutritional supplements, and vitamin/mineral supplements  - Order, calculate, and assess calorie counts as needed  - Recommend, monitor, and adjust tube feedings and TPN/PPN based on assessed needs  - Assess need for intravenous fluids  - Provide specific nutrition/hydration education as appropriate  - Include patient/family/caregiver in decisions related to nutrition  Outcome: Progressing

## 2019-03-13 NOTE — CONSULTS
Consultation  Colon and Rectal Surgery   Jese Potter 29 y o  male MRN: [id number]  Unit/Bed#:  Encounter: [id number]  03/13/19   7:12 PM          Chief Complaint   Patient presents with    Abscess     pt has hx of anal fistula/abscess  pt states "I am just sick, it has to be infected or something " pt c/o green puss drainage  HPI:  Jese Potter is a 29 y o  male who presents with recurrent ischiorectal abscess and anal fistula   The index left perirectal abscess occurred 2 years ago   The patient has had 4 recurrent abscesses   , and currently is with an anal fistula with previus purulent drainage    Patient admitted yesterday for iv  zosyn rx    The patient has had a colonoscopy in jan 2019, and an EGD in February , without the diagnosis of crohn's disease currently     Historical Information   History reviewed  No pertinent past medical history  Past Surgical History:   Procedure Laterality Date    ABCESS DRAINAGE  10/29/2018    carlos anal    NE COLONOSCOPY FLX DX W/COLLJ SPEC WHEN PFRMD N/A 11/21/2018    Procedure: COLONOSCOPY;  Surgeon: Ramona River MD;  Location: [facility]; Service: Gastroenterology    NE ESOPHAGOGASTRODUODENOSCOPY TRANSORAL DIAGNOSTIC N/A 11/21/2018    Procedure: ESOPHAGOGASTRODUODENOSCOPY (EGD); Surgeon: Ramona River MD;  Location: [facility]; Service: Gastroenterology    NE ESOPHAGOGASTRODUODENOSCOPY TRANSORAL DIAGNOSTIC N/A 2/7/2019    Procedure: ESOPHAGOGASTRODUODENOSCOPY (EGD); Surgeon: Ramona River MD;  Location: [facility];   Service: Gastroenterology       Meds/Allergies     Medications Prior to Admission   Medication    aluminum hydroxide-magnesium carbonate (GAVISCON)  mg/15 mL oral suspension    dicyclomine (BENTYL) 20 mg tablet    famotidine (PEPCID) 40 MG tablet    omeprazole (PriLOSEC) 20 mg delayed release capsule    omeprazole (PriLOSEC) 40 MG capsule    ondansetron (ZOFRAN) 4 mg tablet    ondansetron (ZOFRAN-ODT) 4 mg disintegrating tablet    ondansetron (ZOFRAN-ODT) 4 mg disintegrating tablet         Current Facility-Administered Medications:     acetaminophen (TYLENOL) tablet 650 mg, 650 mg, Oral, Q6H PRN, Barak Brewster MD, 650 mg at 03/12/19 2245    dicyclomine (BENTYL) tablet 20 mg, 20 mg, Oral, Q6H, Barak Brewster MD, 20 mg at 03/13/19 1310    enoxaparin (LOVENOX) subcutaneous injection 40 mg, 40 mg, Subcutaneous, Daily, Barak Brewster MD    famotidine (PEPCID) tablet 40 mg, 40 mg, Oral, HS, Barak Brewster MD, 40 mg at 03/12/19 2245    morphine (PF) 4 mg/mL injection 4 mg, 4 mg, Intravenous, Q4H PRN, Sam Riggs MD, 4 mg at 03/12/19 2356    ondansetron (ZOFRAN) injection 4 mg, 4 mg, Intravenous, Q6H PRN, Barak Brewster MD, 4 mg at 03/12/19 2247    pantoprazole (PROTONIX) EC tablet 40 mg, 40 mg, Oral, Early Morning, Barak Brewster MD    piperacillin-tazobactam (ZOSYN) 3 375 g in sodium chloride 0 9 % 50 mL IVPB, 3 375 g, Intravenous, Q6H, Barak Brewster MD, Last Rate: 100 mL/hr at 03/13/19 1750, 3 375 g at 03/13/19 1750    No Known Allergies      Social History   Social History     Substance and Sexual Activity   Alcohol Use No     Social History     Substance and Sexual Activity   Drug Use Not Currently    Frequency: 4 0 times per week    Types: Marijuana     Social History     Tobacco Use   Smoking Status Current Every Day Smoker    Packs/day: 0 50   Smokeless Tobacco Never Used         Family History:   Family History   Problem Relation Age of Onset    Diabetes Father          Objective     Current Vitals:   Blood Pressure: 112/52 (03/13/19 1544)  Pulse: 62 (03/13/19 1544)  Temperature: 98 7 °F (37 1 °C) (03/13/19 1544)  Temp Source: Oral (03/13/19 1544)  Respirations: 16 (03/13/19 1544)  Height: 5' 11" (180 3 cm) (03/12/19 2315)  Weight - Scale: 90 2 kg (198 lb 13 7 oz) (03/12/19 2315)  SpO2: 97 % (03/13/19 1544)    Intake/Output Summary (Last 24 hours) at 3/13/2019 1912  Last data filed at 3/13/2019 1200  Gross per 24 hour   Intake 3190 ml   Output --   Net 3190 ml       Physical Exam:  General:WN WD  MAN CURRENTLY W/O ACUTE DISTRESS   Eyes:  ENT:  Neck:  Pulm:  CV:  Abdomen:BENIGN , W/O TENDERNESS OR HEPATOSPLENOMEGALY   Rectal: LEFT ANTERIOR PERIRECTAL FISTULA WITHOUT PALPABLE ABSCESS  Extremities:  Lymphatics:        Lab Results: I have personally reviewed pertinent lab results  Imaging: I have personally reviewed pertinent reports              CT REPORT REVIEWED     ASSESSMENT:  Seth Hayward is a 29 y o  male who presents with  LEFT ANAL FISTULA , WITH SPONTANEOUS DRAINAGE OF PERIRECTAL ABSCESS       PLAN: EUA IN AM  WITH SUBMUSCULAR FISTULOTOMY, AND INSERTION OF AN ANAL SETON   INFORMED CONSENT OBTAINED             Consults

## 2019-03-13 NOTE — PROGRESS NOTES
Progress Note Shelley Krishnamurthy 1985, 29 y o  male MRN: [id number]    Unit/Bed#:  Encounter: [id number]    Primary Care Provider: Loan Casillas DO   Date and time admitted to hospital: 3/12/2019  2:32 PM            Assessment/Plan:     Hospital Problem List:      Principal Problem:    Anal fistula  Active Problems:     Perirectal abscess    History of rectal abscess    Anal abscess        Plan for the Primary Problem(s):  1  Perianal fistula- chronic perianal fistula complicated by 4 recurrent abscess status post multiple drainage, history of seton placement  Currently presents with perianal fistula/abscess with spontaneous purulent drainage  Recent colonoscopy was negative for Crohn's disease  No signs of systemic infection present  Nontoxic appearing  Currently on IV Zosyn  Pending colorectal surgery consultation  GI following    2  Perirectal abscess:  Spontaneous drainage of perirectal abscess  Plan as per above  3   GERD- on omeprazole      VTE Pharmacologic Prophylaxis:   Pharmacologic: Enoxaparin (Lovenox)    Patient Centered Rounds: I have performed bedside rounds with nursing staff today  Discussions with Specialists or Other Care Team Provider:  GI recommendation noted    Education and Discussions with Family / Patient:  Patient/spouse present at the bedside, answered all questions appropriately  Time Spent for Care: 20 minutes  More than 50% of total time spent on counseling and coordination of care as described above  Current Length of Stay: 1 day(s)    Current Patient Status: Inpatient   Certification Statement: The patient will continue to require additional inpatient hospital stay due to Above-mentioned medical problems, pending colorectal surgery consultation    Discharge Plan:  Once above resolves  Code Status: Level 1 - Full Code      Subjective:   Not in acute distress  Seen resting comfortably  Wakes up with verbal stimuli  Nontoxic appearing  Complaining of spontaneous rectal urine drainage  Denies any other complaints  No other events reported  Objective:     Vitals:   Temp (24hrs), Av 9 °F (36 6 °C), Min:97 2 °F (36 2 °C), Max:98 7 °F (37 1 °C)    Temp:  [97 2 °F (36 2 °C)-98 7 °F (37 1 °C)] 98 7 °F (37 1 °C)  HR:  [53-65] 62  Resp:  [16-17] 16  BP: (105-145)/(52-71) 112/52  SpO2:  [97 %] 97 %  Body mass index is 27 73 kg/m²  Input and Output Summary (last 24 hours): Intake/Output Summary (Last 24 hours) at 3/13/2019 1954  Last data filed at 3/13/2019 1200  Gross per 24 hour   Intake 410 ml   Output --   Net 410 ml       Physical Exam:     Physical Exam   Constitutional: He is oriented to person, place, and time  No distress  HENT:   Head: Normocephalic and atraumatic  Eyes: Pupils are equal, round, and reactive to light  EOM are normal    Neck: Normal range of motion  Neck supple  No JVD present  Cardiovascular: Normal rate and regular rhythm  Pulmonary/Chest: Effort normal and breath sounds normal  No stridor  No respiratory distress  Abdominal: Soft  Bowel sounds are normal  He exhibits no distension  There is no tenderness  Genitourinary:   Genitourinary Comments: No perirectal abscess noted on exam, purulent discharge noted on exam   Musculoskeletal: Normal range of motion  Neurological: He is alert and oriented to person, place, and time  Psychiatric: He has a normal mood and affect           Additional Data:     Labs:    Results from last 7 days   Lab Units 19  0535 19  1515   WBC Thousand/uL 8 31 9 07   HEMOGLOBIN g/dL 14 0 15 4   HEMATOCRIT % 41 0 44 6   PLATELETS Thousands/uL 240 252   NEUTROS PCT %  --  67   LYMPHS PCT %  --  23   MONOS PCT %  --  7   EOS PCT %  --  3     Results from last 7 days   Lab Units 19  0535 19  1515   SODIUM mmol/L 143 142   POTASSIUM mmol/L 3 9 3 9   CHLORIDE mmol/L 106 104   CO2 mmol/L 28 29   BUN mg/dL 12 12   CREATININE mg/dL 1 01 0 86   ANION GAP mmol/L 9 9   CALCIUM mg/dL 8 8 9 3   ALBUMIN g/dL  --  4 0   TOTAL BILIRUBIN mg/dL  --  0 70   ALK PHOS U/L  --  88   ALT U/L  --  41   AST U/L  --  25   GLUCOSE RANDOM mg/dL 86 112     Results from last 7 days   Lab Units 03/12/19  1515   INR  0 98             Results from last 7 days   Lab Units 03/12/19  1515   LACTIC ACID mmol/L 1 0           * I Have Reviewed All Lab Data Listed Above  * Additional Pertinent Lab Tests Reviewed: All Labs Within Last 24 Hours Reviewed      Recent Cultures (last 7 days):           Last 24 Hours Medication List:     Current Facility-Administered Medications:  acetaminophen 650 mg Oral Q6H PRN Bibiana Cisneros MD    dicyclomine 20 mg Oral Q6H Bibiana Cisneros MD    enoxaparin 40 mg Subcutaneous Daily Bibiana Cisneros MD    famotidine 40 mg Oral HS Bibiana Cisneros MD    [START ON 3/14/2019] lactated ringers 100 mL/hr Intravenous Continuous Tanja Chairez MD    morphine injection 4 mg Intravenous Q4H PRN Xiomara Recio MD    ondansetron 4 mg Intravenous Q6H PRN Bibiana Cisneros MD    pantoprazole 40 mg Oral Early Morning Bibiana Cisneros MD    piperacillin-tazobactam 3 375 g Intravenous Q6H Bibiana Cisneros MD Last Rate: 3 375 g (03/13/19 1750)        Today, Patient Was Seen By: Francisco Bush MD    ** Please Note: Dictation voice to text software may have been used in the creation of this document   **

## 2019-03-13 NOTE — PLAN OF CARE
Problem: PAIN - ADULT  Goal: Verbalizes/displays adequate comfort level or baseline comfort level  Description  Interventions:  - Encourage patient to monitor pain and request assistance  - Assess pain using appropriate pain scale  - Administer analgesics based on type and severity of pain and evaluate response  - Implement non-pharmacological measures as appropriate and evaluate response  - Consider cultural and social influences on pain and pain management  - Notify physician/advanced practitioner if interventions unsuccessful or patient reports new pain  Outcome: Progressing     Problem: INFECTION - ADULT  Goal: Absence or prevention of progression during hospitalization  Description  INTERVENTIONS:  - Assess and monitor for signs and symptoms of infection  - Monitor lab/diagnostic results  - Monitor all insertion sites, i e  indwelling lines, tubes, and drains  - Monitor endotracheal (as able) and nasal secretions for changes in amount and color  - [place] appropriate cooling/warming therapies per order  - Administer medications as ordered  - Instruct and encourage patient and family to use good hand hygiene technique  - Identify and instruct in appropriate isolation precautions for identified infection/condition  Outcome: Progressing  Goal: Absence of fever/infection during neutropenic period  Description  INTERVENTIONS:  - Monitor WBC  - Implement neutropenic guidelines  Outcome: Progressing     Problem: SAFETY ADULT  Goal: Patient will remain free of falls  Description  INTERVENTIONS:  - Assess patient frequently for physical needs  -  Identify cognitive and physical deficits and behaviors that affect risk of falls    -  [place] fall precautions as indicated by assessment   - Educate patient/family on patient safety including physical limitations  - Instruct patient to call for assistance with activity based on assessment  - Modify environment to reduce risk of injury  - Consider OT/PT consult to assist with strengthening/mobility  Outcome: Progressing  Goal: Maintain or return to baseline ADL function  Description  INTERVENTIONS:  -  Assess patient's ability to carry out ADLs; assess patient's baseline for ADL function and identify physical deficits which impact ability to perform ADLs (bathing, care of mouth/teeth, toileting, grooming, dressing, etc )  - Assess/evaluate cause of self-care deficits   - Assess range of motion  - Assess patient's mobility; develop plan if impaired  - Assess patient's need for assistive devices and provide as appropriate  - Encourage maximum independence but intervene and supervise when necessary  ¯ Involve family in performance of ADLs  ¯ Assess for home care needs following discharge   ¯ Request OT consult to assist with ADL evaluation and planning for discharge  ¯ Provide patient education as appropriate  Outcome: Progressing  Goal: Maintain or return mobility status to optimal level  Description  INTERVENTIONS:  - Assess patient's baseline mobility status (ambulation, transfers, stairs, etc )    - Identify cognitive and physical deficits and behaviors that affect mobility  - Identify mobility aids required to assist with transfers and/or ambulation (gait belt, sit-to-stand, lift, walker, cane, etc )  - [place] fall precautions as indicated by assessment  - Record patient progress and toleration of activity level on Mobility SBAR; progress patient to next Phase/Stage  - Instruct patient to call for assistance with activity based on assessment  - Request Rehabilitation consult to assist with strengthening/weightbearing, etc   Outcome: Progressing     Problem: DISCHARGE PLANNING  Goal: Discharge to home or other facility with appropriate resources  Description  INTERVENTIONS:  - Identify barriers to discharge w/patient and caregiver  - Arrange for needed discharge resources and transportation as appropriate  - Identify discharge learning needs (meds, wound care, etc )  - Arrange for interpretive services to assist at discharge as needed  - Refer to Case Management Department for coordinating discharge planning if the patient needs post-hospital services based on physician/advanced practitioner order or complex needs related to functional status, cognitive ability, or social support system  Outcome: Progressing

## 2019-03-13 NOTE — UTILIZATION REVIEW
Initial Clinical Review    Admission: Date/Time/Statement: 3/12/19 @ 1820   Orders Placed This Encounter   Procedures    Inpatient Admission     Standing Status:   Standing     Number of Occurrences:   1     Order Specific Question:   Admitting Physician     Answer:   Emir Tobin     Order Specific Question:   Level of Care     Answer:   Med Surg [16]     Order Specific Question:   Estimated length of stay     Answer:   More than 2 Midnights     Order Specific Question:   Certification     Answer:   I certify that inpatient services are medically necessary for this patient for a duration of greater than two midnights  See H&P and MD Progress Notes for additional information about the patient's course of treatment  ED: Date/Time/Mode of Arrival:   ED Arrival Information     Expected Arrival Acuity Means of Arrival Escorted By Service Admission Type    - 3/12/2019 13:55 Urgent Walk-In Family Member General Medicine Urgent    Arrival Complaint    abscess        Chief Complaint:   Chief Complaint   Patient presents with    Abscess     pt has hx of anal fistula/abscess  pt states "I am just sick, it has to be infected or something " pt c/o green puss drainage  Assessment/Plan: 30 yo male from home to ED w/ recurrent abscess , perianal fistula for 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 years   Inc purulent drainage , chills  , painful to lay on back   + N/V     Plan for the Primary Problem(s):  · 1  Perianal fistula- patient states that there has been purulent discharge for the past two days  Patient afebrile, no leukocytosis but will place on IV zosyn  CT abdomen did not show any collection to indicate abscess  GI and surgery to evaluate  · 2    GERD- on omeprazole    ED Vital Signs:   ED Triage Vitals   Temperature Pulse Respirations Blood Pressure SpO2   03/12/19 1401 03/12/19 1401 03/12/19 1401 03/12/19 1401 03/12/19 1401   97 5 °F (36 4 °C) 100 18 149/85 99 %      Temp Source Heart Rate Source Patient Position - Orthostatic VS BP Location FiO2 (%)   03/12/19 1401 03/12/19 1637 03/12/19 1637 03/12/19 1637 --   Oral Monitor Lying Left arm       Pain Score       03/12/19 1401       7        Wt Readings from Last 1 Encounters:   03/12/19 90 2 kg (198 lb 13 7 oz)     Vital Signs (abnormal): wnl   Pertinent Labs/Diagnostic Test Results: CT abd -Linear soft tissue density extending from the left side of the anal verge posteriorly into the medial left gluteal subcutaneous tissues in keeping with this patient's known fistula  Unchanged appearance when compared with the prior study from 2/5/2019    ED Treatment:   Medication Administration from 03/12/2019 1355 to 03/12/2019 2307       Date/Time Order Dose Route Action Action by Comments     03/12/2019 1917 sodium chloride 0 9 % bolus 2,730 mL 0 mL/kg Intravenous Stopped Novelty Haim, MATTHEW      03/12/2019 1524 sodium chloride 0 9 % bolus 2,730 mL 2,730 mL Intravenous New Bag Novelty [facility]      03/12/2019 1516 ondansetron (ZOFRAN) injection 4 mg 4 mg Intravenous Given Novelty Haim, RN      03/12/2019 1519 HYDROmorphone (DILAUDID) injection 0 5 mg 0 5 mg Intravenous Given Michael Haim, RN      03/12/2019 1649 iohexol (OMNIPAQUE) 240 MG/ML solution 50 mL 50 mL Oral Given Michael Haim, RN      03/12/2019 1708 iohexol (OMNIPAQUE) 350 MG/ML injection (MULTI-DOSE) 100 mL 100 mL Intravenous Given Floyde Carol      03/12/2019 2013 dicyclomine (BENTYL) tablet 20 mg 20 mg Oral Given Novelty Roy, RN      03/12/2019 2245 famotidine (PEPCID) tablet 40 mg 40 mg Oral Given Michael Roy, RN      03/12/2019 2017 sodium chloride 0 9 % infusion 125 mL/hr Intravenous New Bag Novelty [facility]      03/12/2019 2245 acetaminophen (TYLENOL) tablet 650 mg 650 mg Oral Given Michael Haim, RN      03/12/2019 2247 ondansetron (ZOFRAN) injection 4 mg 4 mg Intravenous Given Michael Roy, RN      03/12/2019 1946 piperacillin-tazobactam (ZOSYN) 3 375 g in sodium chloride 0 9 % 50 mL IVPB 0 g Intravenous Stopped Princess Paniagua RN      03/12/2019 1916 piperacillin-tazobactam (ZOSYN) 3 375 g in sodium chloride 0 9 % 50 mL IVPB 3 375 g Intravenous New Bag Princess Paniagua RN         Past Medical/Surgical History: Active Ambulatory Problems     Diagnosis Date Noted    Internal hemorrhoids 17/62/6040    Umbilical bleeding 56/85/7093    Nausea 11/13/2018    Rectal pain 11/13/2018    Pharyngeal dysphagia 11/13/2018    History of rectal abscess 32/51/5175    Periumbilical pain, chronic 84/65/1876    Decreased appetite 11/13/2018    Other fatigue 11/13/2018    Migraine 11/13/2018    Toshia-rectal abscess 11/13/2018    Nausea and vomiting 11/13/2018    Dysphagia 11/13/2018    Rectal abscess 11/13/2018    Perirectal abscess 11/13/2018    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    Intractable vomiting with nausea 02/06/2019       Admitting Diagnosis: Perianal fistula [K60 3]  Anal abscess [K61 0]  Age/Sex: 29 y o  male  Admission Orders:  Scheduled Meds:   Current Facility-Administered Medications:  acetaminophen 650 mg Oral Q6H PRN     dicyclomine 20 mg Oral Q6H     enoxaparin 40 mg Subcutaneous Daily     famotidine 40 mg Oral HS     morphine injection 4 mg Intravenous Q4H PRN x1    ondansetron 4 mg Intravenous Q6H PRN x1    pantoprazole 40 mg Oral Early Morning     piperacillin-tazobactam 3 375 g Intravenous Q6H  Last Rate: 3 375 g (03/13/19 2780)     Colorectal surgical consult   Reg diet   Up and OOB as ciara   GI and acute care surgery consult   3/13 calprotein fecal , c reative protein , sed rate , cbc , bmp     GI consult  3/13   1) Perianal fistula with abscess - Patient has been dealing with this issue for 2 years  He had EGD/colonoscopy in the past that was negative for obvious signs of IBD     - Agree with colorectal evaluation   - ESR, CRP, fecal calprotectin   - Will defer need for pelvic MRI to colorectal surgery   - Will consider outpatient VCE vs enterography as well to complete evaluation   - Agree with continuing Zosyn

## 2019-03-13 NOTE — ED NOTES
1  CC- Patient c/o anal fistula/abcess with purulent drainage    2  Orientation status- Alert and oriented x 4    3  Abnormal labs/abnormal focused assessment/abnormal vitals- Pt has perianal fistula    4  Medications/drips- NS 125ml/hr    5  Last time narcotics/pain meds given- Tylenol @1045    6  IV lines/drains/etc  20GRAC    7  Isolation status NONE  8  Skin    9  Ambulation status- Fully ambulatory    10   ED phone # [phone]     Austin Cr RN  03/12/19 1288

## 2019-03-13 NOTE — PLAN OF CARE
Problem: DISCHARGE PLANNING - CARE MANAGEMENT  Goal: Discharge to post-acute care or home with appropriate resources  Description  INTERVENTIONS:  - Conduct assessment to determine patient/family and health care team treatment goals, and need for post-acute services based on payer coverage, community resources, and patient preferences, and barriers to discharge  - Address psychosocial, clinical, and financial barriers to discharge as identified in assessment in conjunction with the patient/family and health care team  - Arrange appropriate level of post-acute services according to patient?s   needs and preference and payer coverage in collaboration with the physician and health care team  - Communicate with and update the patient/family, physician, and health care team regarding progress on the discharge plan  - Arrange appropriate transportation to post-acute venues  Outcome: Progressing  Note:   CM met with pt at bedside  Pt lives with his wife Shaun Henley and 2 children in a one story house with no ALISHA  Pt has no problem navigating steps and is independent with ADL's  He uses no DME's  He has used OP/PT for back issues years ago, but has never used [facility] services  Dr Isabel Parsons is his PCP  Denies substance abuse or mental health issues  He is a smoker-PPD-1/2 x 15 years  He uses [facility] in [place] and has no problem with his co-pays  He works and drives  His wife will transport home when he is medically cleared  CM discussed d/c needs including [facility] services, but pt does not feel this will be needed  CM will continue to follow through hospitalization  CM reviewed discharge planning process including the following: identifying help at home, patient preference for discharge planning needs, pharmacy preference, and availability of treatment team to discuss questions or concerns patient and/or family may have regarding understanding medications and recognizing signs and symptoms once discharged   CM also encouraged patient to follow up with all recommended appointments after discharge  Patient advised of importance for patient and family to participate in managing patient?s medical well being  CM name and role reviewed  Discharge Checklist reviewed and CM will continue to monitor for progress toward discharge goals in nursing and provider rounds

## 2019-03-13 NOTE — SOCIAL WORK
CM met with pt at bedside  Pt lives with his wife Shaun Henley and 2 children in a one story house with no ALISAH  Pt has no problem navigating steps and is independent with ADL's  He uses no DME's  He has used OP/PT for back issues years ago, but has never used [facility] services  Dr Isabel Parsons is his PCP  Denies substance abuse or mental health issues  He is a smoker-PPD-1/2 x 15 years  He uses [facility] in [place]  [facility] and has no problem with his co-pays  He works and drives  His wife will transport home when he is medically cleared  CM discussed d/c needs including [facility] services, but pt does not feel this will be needed  CM will continue to follow through hospitalization  CM reviewed discharge planning process including the following: identifying help at home, patient preference for discharge planning needs, pharmacy preference, and availability of treatment team to discuss questions or concerns patient and/or family may have regarding understanding medications and recognizing signs and symptoms once discharged  CM also encouraged patient to follow up with all recommended appointments after discharge  Patient advised of importance for patient and family to participate in managing patients medical well being  CM name and role reviewed  Discharge Checklist reviewed and CM will continue to monitor for progress toward discharge goals in nursing and provider rounds

## 2019-03-13 NOTE — CONSULTS
Consultation - [facility]  Chon Tobin 29 y o  male MRN: [id number]  Unit/Bed#:  Encounter: [id number]         Reason for Consult / Principal Problem:  Perianal fistula with abscess    HPI:  Chante Mcconnell is a 70-year-old male with history of perianal fistula with abscess status post multiple drainages  Patient reports that this began about 2 years ago  Patient presented to the emergency room for nausea, vomiting, chills and drainage from his known recurrent perianal abscess  He had a CT on admission revealing a linear soft tissue density extending from the left side of the anal verge posteriorly into the medial left gluteal subcutaneous tissue  On admission, fortunately the patient did not have fever or leukocytosis  Patient's last colonoscopy was in November with Dr Nash Boeck  There was a small sigmoid polyp that was removed  Otherwise, the colon mucosa and the terminal ileum appeared normal   He had an inflammatory bowel disease panel that was negative as an outpatient  Patient's last EGD was in February  This revealed an esophageal stricture requiring dilation  Hiatal hernia noted  There was a large amount of food in the greater curvature  Patient was planned to have a GES patient  He has never had a VCE  He has no family history of IBD to his knowledge  Review of Systems:    CONSTITUTIONAL: Denies any fever, chills, or rigors  Good appetite, and no recent weight loss  HEENT: No earache or tinnitus  Denies hearing loss or visual disturbances  CARDIOVASCULAR: No chest pain or palpitations  RESPIRATORY: Denies any cough, hemoptysis, shortness of breath or dyspnea on exertion  GASTROINTESTINAL: As noted in the History of Present Illness  GENITOURINARY: No problems with urination  Denies any hematuria or dysuria  NEUROLOGIC: No dizziness or vertigo, denies headaches  MUSCULOSKELETAL: Denies any muscle or joint pain  SKIN: Denies skin rashes or itching  ENDOCRINE: Denies excessive thirst  Denies intolerance to heat or cold  PSYCHOSOCIAL: Denies depression or anxiety  Denies any recent memory loss  Historical Information   History reviewed  No pertinent past medical history  Past Surgical History:   Procedure Laterality Date    ABCESS DRAINAGE  10/29/2018    carlos anal    GA COLONOSCOPY FLX DX W/COLLJ SPEC WHEN PFRMD N/A 11/21/2018    Procedure: COLONOSCOPY;  Surgeon: Lisbeth Lund MD;  Location: [facility]; Service: Gastroenterology    GA ESOPHAGOGASTRODUODENOSCOPY TRANSORAL DIAGNOSTIC N/A 11/21/2018    Procedure: ESOPHAGOGASTRODUODENOSCOPY (EGD); Surgeon: Lisbeth Lund MD;  Location: [facility]; Service: Gastroenterology    GA ESOPHAGOGASTRODUODENOSCOPY TRANSORAL DIAGNOSTIC N/A 2/7/2019    Procedure: ESOPHAGOGASTRODUODENOSCOPY (EGD); Surgeon: Lisbeth Lund MD;  Location: [facility];   Service: Gastroenterology     Social History   Social History     Substance and Sexual Activity   Alcohol Use No     Social History     Substance and Sexual Activity   Drug Use Not Currently    Frequency: 4 0 times per week    Types: Marijuana     Social History     Tobacco Use   Smoking Status Current Every Day Smoker    Packs/day: 0 50   Smokeless Tobacco Never Used     Family History   Problem Relation Age of Onset    Diabetes Father         Meds/Allergies     Current Facility-Administered Medications   Medication Dose Route Frequency    acetaminophen (TYLENOL) tablet 650 mg  650 mg Oral Q6H PRN    dicyclomine (BENTYL) tablet 20 mg  20 mg Oral Q6H    enoxaparin (LOVENOX) subcutaneous injection 40 mg  40 mg Subcutaneous Daily    famotidine (PEPCID) tablet 40 mg  40 mg Oral HS    morphine (PF) 4 mg/mL injection 4 mg  4 mg Intravenous Q4H PRN    ondansetron (ZOFRAN) injection 4 mg  4 mg Intravenous Q6H PRN    pantoprazole (PROTONIX) EC tablet 40 mg  40 mg Oral Early Morning    piperacillin-tazobactam (ZOSYN) 3 375 g in sodium chloride 0 9 % 50 mL IVPB  3 375 g Intravenous Q6H       No Known Allergies      Objective     Blood pressure 105/54, pulse (!) 53, temperature 97 7 °F (36 5 °C), temperature source Oral, resp  rate 16, height 5' 11" (1 803 m), weight 90 2 kg (198 lb 13 7 oz), SpO2 97 %  Intake/Output Summary (Last 24 hours) at 3/13/2019 1159  Last data filed at 3/13/2019 0701  Gross per 24 hour   Intake 3140 ml   Output --   Net 3140 ml         PHYSICAL EXAM:      General Appearance:   Alert and oriented x 3  Cooperative, and in no respiratory distress   HEENT:   Normocephalic, atraumatic, anicteric      Neck:  Supple, symmetrical, trachea midline   Lungs:   Clear to auscultation bilaterally; no rales, rhonchi or wheezing; respirations unlabored    Heart[de-identified]   S1 and S2 normal; regular rate and rhythm; no murmur, rub, or gallop     Abdomen:   Soft, non-tender, non-distended; normal bowel sounds; no masses, no organomegaly    Genitalia:   Deferred    Rectal:   Deferred    Extremities:  No cyanosis, clubbing or edema    Pulses:  2+ and symmetric all extremities    Skin:  Skin color, texture, turgor normal, no rashes or lesions    Lymph nodes:  No palpable cervical or supraclavicular lymphadenopathy        Lab Results:   Results from last 7 days   Lab Units 03/13/19  0535 03/12/19  1515   WBC Thousand/uL 8 31 9 07   HEMOGLOBIN g/dL 14 0 15 4   HEMATOCRIT % 41 0 44 6   PLATELETS Thousands/uL 240 252   NEUTROS PCT %  --  67   LYMPHS PCT %  --  23   MONOS PCT %  --  7   EOS PCT %  --  3     Results from last 7 days   Lab Units 03/13/19  0535 03/12/19  1515   POTASSIUM mmol/L 3 9 3 9   CHLORIDE mmol/L 106 104   CO2 mmol/L 28 29   BUN mg/dL 12 12   CREATININE mg/dL 1 01 0 86   CALCIUM mg/dL 8 8 9 3   ALK PHOS U/L  --  88   ALT U/L  --  41   AST U/L  --  25     Results from last 7 days   Lab Units 03/12/19  1515   INR  0 98     Results from last 7 days   Lab Units 03/12/19  1515   LIPASE u/L 89       Imaging Studies: I have personally reviewed pertinent imaging studies  Ct Abdomen Pelvis With Contrast    Result Date: 3/12/2019  Impression: Linear soft tissue density extending from the left side of the anal verge posteriorly into the medial left gluteal subcutaneous tissues in keeping with this patient's known fistula  Unchanged appearance when compared with the prior study from 2/5/2019  No discrete collection to indicate an abscess  Workstation performed: [id number]       ASSESSMENT and PLAN:      1) Perianal fistula with abscess - Patient has been dealing with this issue for 2 years  He had EGD/colonoscopy in the past that was negative for obvious signs of IBD  - Agree with colorectal evaluation   - ESR, CRP, fecal calprotectin   - Pelvic MRI    - Will consider outpatient VCE vs enterography as well to complete evaluation   - Agree with continuing Zosyn     The patient was seen and examined by Dr Angel Fish, all quiroz medical decisions were made with Dr Angel Fish  Thank you for allowing us to participate in the care of this pleasant patient  We will follow up with you closely

## 2019-03-14 ENCOUNTER — ANESTHESIA (INPATIENT)
Dept: PERIOP | Facility: HOSPITAL | Age: 34
DRG: 226 | End: 2019-03-14
Payer: COMMERCIAL

## 2019-03-14 ENCOUNTER — ANESTHESIA EVENT (INPATIENT)
Dept: PERIOP | Facility: HOSPITAL | Age: 34
DRG: 226 | End: 2019-03-14
Payer: COMMERCIAL

## 2019-03-14 VITALS
OXYGEN SATURATION: 97 % | WEIGHT: 198.85 LBS | RESPIRATION RATE: 18 BRPM | HEIGHT: 71 IN | TEMPERATURE: 98.5 F | BODY MASS INDEX: 27.84 KG/M2 | HEART RATE: 69 BPM | DIASTOLIC BLOOD PRESSURE: 55 MMHG | SYSTOLIC BLOOD PRESSURE: 117 MMHG

## 2019-03-14 LAB
CRP SERPL QL: 3.9 MG/L
ERYTHROCYTE [SEDIMENTATION RATE] IN BLOOD: 4 MM/HOUR (ref 0–10)

## 2019-03-14 PROCEDURE — 88304 TISSUE EXAM BY PATHOLOGIST: CPT | Performed by: PATHOLOGY

## 2019-03-14 PROCEDURE — 99239 HOSP IP/OBS DSCHRG MGMT >30: CPT | Performed by: STUDENT IN AN ORGANIZED HEALTH CARE EDUCATION/TRAINING PROGRAM

## 2019-03-14 PROCEDURE — 85652 RBC SED RATE AUTOMATED: CPT | Performed by: PHYSICIAN ASSISTANT

## 2019-03-14 PROCEDURE — 86140 C-REACTIVE PROTEIN: CPT | Performed by: PHYSICIAN ASSISTANT

## 2019-03-14 PROCEDURE — 0DBQ3ZZ EXCISION OF ANUS, PERCUTANEOUS APPROACH: ICD-10-PCS | Performed by: COLON & RECTAL SURGERY

## 2019-03-14 RX ORDER — OXYCODONE HYDROCHLORIDE 10 MG/1
10 TABLET ORAL EVERY 4 HOURS PRN
Status: DISCONTINUED | OUTPATIENT
Start: 2019-03-14 | End: 2019-03-14 | Stop reason: HOSPADM

## 2019-03-14 RX ORDER — OXYCODONE HYDROCHLORIDE AND ACETAMINOPHEN 5; 325 MG/1; MG/1
1 TABLET ORAL EVERY 6 HOURS PRN
Qty: 15 TABLET | Refills: 0 | Status: SHIPPED | OUTPATIENT
Start: 2019-03-14 | End: 2019-03-14 | Stop reason: HOSPADM

## 2019-03-14 RX ORDER — MIDAZOLAM HYDROCHLORIDE 1 MG/ML
2 INJECTION INTRAMUSCULAR; INTRAVENOUS ONCE
Status: DISCONTINUED | OUTPATIENT
Start: 2019-03-14 | End: 2019-03-14

## 2019-03-14 RX ORDER — MIDAZOLAM HYDROCHLORIDE 1 MG/ML
INJECTION INTRAMUSCULAR; INTRAVENOUS AS NEEDED
Status: DISCONTINUED | OUTPATIENT
Start: 2019-03-14 | End: 2019-03-14 | Stop reason: SURG

## 2019-03-14 RX ORDER — KETOROLAC TROMETHAMINE 10 MG/1
10 TABLET, FILM COATED ORAL EVERY 6 HOURS PRN
Qty: 20 TABLET | Refills: 0 | Status: SHIPPED | OUTPATIENT
Start: 2019-03-14 | End: 2019-04-29 | Stop reason: ALTCHOICE

## 2019-03-14 RX ORDER — ACETAMINOPHEN 325 MG/1
650 TABLET ORAL EVERY 6 HOURS PRN
Qty: 30 TABLET | Refills: 0 | Status: SHIPPED | OUTPATIENT
Start: 2019-03-14 | End: 2019-04-29 | Stop reason: ALTCHOICE

## 2019-03-14 RX ORDER — DEXAMETHASONE SODIUM PHOSPHATE 10 MG/ML
INJECTION, SOLUTION INTRAMUSCULAR; INTRAVENOUS AS NEEDED
Status: DISCONTINUED | OUTPATIENT
Start: 2019-03-14 | End: 2019-03-14 | Stop reason: SURG

## 2019-03-14 RX ORDER — FENTANYL CITRATE 50 UG/ML
INJECTION, SOLUTION INTRAMUSCULAR; INTRAVENOUS AS NEEDED
Status: DISCONTINUED | OUTPATIENT
Start: 2019-03-14 | End: 2019-03-14 | Stop reason: SURG

## 2019-03-14 RX ORDER — MIDAZOLAM HYDROCHLORIDE 1 MG/ML
2 INJECTION INTRAMUSCULAR; INTRAVENOUS ONCE
Status: COMPLETED | OUTPATIENT
Start: 2019-03-14 | End: 2019-03-14

## 2019-03-14 RX ORDER — PROPOFOL 10 MG/ML
INJECTION, EMULSION INTRAVENOUS AS NEEDED
Status: DISCONTINUED | OUTPATIENT
Start: 2019-03-14 | End: 2019-03-14 | Stop reason: SURG

## 2019-03-14 RX ORDER — BUPIVACAINE HYDROCHLORIDE AND EPINEPHRINE 2.5; 5 MG/ML; UG/ML
INJECTION, SOLUTION EPIDURAL; INFILTRATION; INTRACAUDAL; PERINEURAL AS NEEDED
Status: DISCONTINUED | OUTPATIENT
Start: 2019-03-14 | End: 2019-03-14 | Stop reason: HOSPADM

## 2019-03-14 RX ORDER — MAGNESIUM HYDROXIDE 1200 MG/15ML
LIQUID ORAL AS NEEDED
Status: DISCONTINUED | OUTPATIENT
Start: 2019-03-14 | End: 2019-03-14 | Stop reason: HOSPADM

## 2019-03-14 RX ADMIN — PROPOFOL 100 MG: 10 INJECTION, EMULSION INTRAVENOUS at 08:36

## 2019-03-14 RX ADMIN — PIPERACILLIN SODIUM,TAZOBACTAM SODIUM 3.38 G: 3; .375 INJECTION, POWDER, FOR SOLUTION INTRAVENOUS at 06:21

## 2019-03-14 RX ADMIN — PIPERACILLIN SODIUM,TAZOBACTAM SODIUM 3.38 G: 3; .375 INJECTION, POWDER, FOR SOLUTION INTRAVENOUS at 01:24

## 2019-03-14 RX ADMIN — PROPOFOL 25 MG: 10 INJECTION, EMULSION INTRAVENOUS at 08:44

## 2019-03-14 RX ADMIN — MIDAZOLAM 2 MG: 1 INJECTION INTRAMUSCULAR; INTRAVENOUS at 08:28

## 2019-03-14 RX ADMIN — ONDANSETRON 4 MG: 2 INJECTION INTRAMUSCULAR; INTRAVENOUS at 08:45

## 2019-03-14 RX ADMIN — FENTANYL CITRATE 50 MCG: 50 INJECTION, SOLUTION INTRAMUSCULAR; INTRAVENOUS at 08:36

## 2019-03-14 RX ADMIN — DICYCLOMINE HYDROCHLORIDE 20 MG: 20 TABLET ORAL at 01:24

## 2019-03-14 RX ADMIN — PROPOFOL 25 MG: 10 INJECTION, EMULSION INTRAVENOUS at 08:40

## 2019-03-14 RX ADMIN — DEXAMETHASONE SODIUM PHOSPHATE 10 MG: 10 INJECTION, SOLUTION INTRAMUSCULAR; INTRAVENOUS at 08:45

## 2019-03-14 RX ADMIN — SODIUM CHLORIDE, SODIUM LACTATE, POTASSIUM CHLORIDE, AND CALCIUM CHLORIDE 100 ML/HR: .6; .31; .03; .02 INJECTION, SOLUTION INTRAVENOUS at 00:23

## 2019-03-14 RX ADMIN — FENTANYL CITRATE 50 MCG: 50 INJECTION, SOLUTION INTRAMUSCULAR; INTRAVENOUS at 08:45

## 2019-03-14 RX ADMIN — MIDAZOLAM HYDROCHLORIDE 2 MG: 1 INJECTION, SOLUTION INTRAMUSCULAR; INTRAVENOUS at 08:35

## 2019-03-14 NOTE — ANESTHESIA PREPROCEDURE EVALUATION
Review of Systems/Medical History  Patient summary reviewed  Chart reviewed      Cardiovascular  Negative cardio ROS    Pulmonary  Smoker cigarette smoker  ,        GI/Hepatic  Negative GI/hepatic ROS          Negative  ROS        Endo/Other  Negative endo/other ROS      GYN  Negative gynecology ROS          Hematology  Negative hematology ROS      Musculoskeletal  Negative musculoskeletal ROS        Neurology  Negative neurology ROS   Headaches,    Psychology           Physical Exam    Airway    Mallampati score: II  TM Distance: >3 FB  Neck ROM: full     Dental   No notable dental hx     Cardiovascular  Comment: Negative ROS, Rhythm: regular, Rate: normal, Cardiovascular exam normal    Pulmonary  Pulmonary exam normal Breath sounds clear to auscultation,     Other Findings        Anesthesia Plan  ASA Score- 2     Anesthesia Type- IV sedation with anesthesia with ASA Monitors  Additional Monitors:   Airway Plan:         Plan Factors-    Induction- intravenous  Postoperative Plan- Plan for postoperative opioid use  Informed Consent- Anesthetic plan and risks discussed with patient  I personally reviewed this patient with the CRNA  Discussed and agreed on the Anesthesia Plan with the CRNA  Carlton Mac

## 2019-03-14 NOTE — OP NOTE
Colon Rectal Surgery Postoperative Note    PATIENT NAME: Jackie Mars  : 1985  MRN: [id number]    [facility] ROOM 02    Surgery Date: 3/14/2019  Left anterior quadrant anal fistula   * No pre-op diagnosis entered *    * No Diagnosis Codes entered *    Procedure(s):  ANAL  FISTULOTOMY    Surgeon(s) and Role:     * Kayy Reid MD - Primary    Specimens:  ID Type Source Tests Collected by Time Destination   1 : Anal Fistula Site Tissue Fistula TISSUE EXAM Kayy Reid MD 3/14/2019 7567          Fluids:     Estimated Blood Loss:   Minimal    Urine: NA    Anesthesia Type:   IV Sedation with Anesthesia     Findings:   LEFT ANTERIOR ANAL MARGIN SECONDARY FISTULOUS OPENING    Procedure Details:   THE PATIENT WAS BROUGHT TO THE [facility] [address]  THE PATIENT WAS PLACED ON THE OPERATING TABLE IN THE PRONE POSITION  TIME-OUTS WERE COMPLETED  THE BUTTOCKS WERE TAPED APART  PATIENT WAS THEN PREPPED AND DRAPED IN A STERILE MANNER  SECOND TIME-OUT COMPLETED  APPROXIMATELY 18 CC OF 0 25% MARCAINE 1 TO [id number] EPINEPHRINE WAS UTILIZED TO PERFORM A PERIANAL FIELD BLOCK  ONCE LOCAL ANESTHESIA HAD BEEN ACHIEVED A BLUNT TIPPED PROBE WAS PLACED IN THE SECONDARY FISTULOUS OPENING ADVANCED TO THE PRIMARY CRYPT  GLANDULAR OPENING   0 SILK SUTURES WERE PLACED THROUGH THE FISTULA TRACT AFTER DID BEEN CURETTED  THE FISTULA TRACT WAS EXCISED WITH PRESERVATION OF THE EXTERNAL SPHINCTER MECHANISM  SKIN SAMPLES OF THE ANAL MARGIN IN ANAL CANAL WERE PROVIDED TO EVALUATE FOR INFLAMMATORY BOWEL DISEASE  THE SILK  SETON WAS SECURED TO ITSELF  THE WOUND EDGES OF THE FISTULOTOMY SITE WERE OVERSEWN FOR HEMOSTASIS  WITH A RUNNING LOCKING 2 0 CHROMIC SUTURE  A DRESSING WAS PLACED THE WOUND WAS COMPLETELY HEMOSTATIC UPON CONCLUSION OF THE PROCEDURE    THE PATIENT WAS RETURNED TO RECOVERY AREA IN SATISFACTORY AND STABLE CONDITION      Complications:   None    SIGNATURE: Kayy Reid MD   DATE: March 14, 2019   TIME: 9:15 AM

## 2019-03-14 NOTE — DISCHARGE INSTRUCTIONS
Recommended close follow-up with    Rectal Fistulotomy   WHAT YOU NEED TO KNOW:   A rectal fistulotomy is surgery to heal a fistula near your anus  A rectal fistula is a tunnel-like wound that forms next to your anus, often after you have an abscess (pus buildup under your skin)  It spreads from inside your rectum to the skin surface near your anus  A rectal fistulotomy opens and drains the rectal fistula  The wound heals by filling in with scar tissue so the fistula does not come back or form an abscess  Your healthcare provider may also drain an abscess during your surgery  DISCHARGE INSTRUCTIONS:   Medicines:   · Pain medicine: You may be given medicine to take away or decrease pain  Do not wait until the pain is severe before you take your medicine  · Stool softeners: This medicine makes it easier for you to pass a bowel movement  You may need this medicine to treat or prevent constipation  · Antibiotics: This medicine will help fight or prevent an infection  Take your antibiotics until they are gone, even if you feel better  · Take your medicine as directed:  Call your healthcare provider if you think your medicine is not helping or if you have side effects  Tell him if you are taking any vitamins, herbs, or other medicines  Keep a list of the medicines you take  Include the amounts and when and why you take them  Bring the list of the pill bottles to follow-up visits  Follow up with your healthcare provider or surgeon every 2 to 3 weeks as directed: You will need to be watched closely after surgery to make sure you do not get sepsis  This is a dangerous blood infection caused by bacteria that can be life-threatening  You may also need to have your seton removed after your surgery  Keep all your follow-up appointments  Write down your questions so you remember to ask them during your visits  Your wound should heal within 4 weeks for a minor fistula or 16 weeks for a complex fistula    Self-care: · Follow nutrition recommendations:  Drink only clear fluids until you start passing bowel movements again  Ask when you may eat regular foods  Eat high-fiber foods once your healthcare provider says it is okay  Examples of high-fiber foods are fruit and whole grains  · Limit your activity as directed: Ask when you can return to your normal activities  · Do not smoke:  Smoking may slow your healing process  · Care for your wound:  Remove any bandages near your anus the next day  You may have some gauze inside your rectum that will fall out on its own or be removed by your healthcare provider  Clean the area as directed  · Take sitz baths:  Sit in a sitz bath or take tub baths in warm water  Do this 3 to 4 times each day for 15 to 20 minutes each time, or as directed  A sitz bath is a clean pan that holds warm water and fits in your toilet bowl  Sitz baths help keep your wound clean and may ease your pain  · Avoid constipation:  Do not stop yourself from having a bowel movement  This may cause constipation  Drink plenty of water  This will help your bowel movements stay soft and make them easier to pass  Exercise also helps your digestive system work better so you avoid constipation  Contact your healthcare provider or surgeon if:   · You have nausea and vomiting and cannot drink liquids  · You urinate less often than you normally do  · You have pain when you urinate  This may mean you have a urinary tract infection  · You have more bleeding, swelling, or pain than you were told to expect  · You have a fever  · You become constipated  · You have pus or blood that drains from your wound and does not stop, or your wound is not healing as it should  · You have stool drainage that does not stop or you cannot control your bowel movements or gas  · You have questions or concerns about your condition or care    Seek care immediately or call 911 if:   · You are not able to pass urine within 8 hours after your surgery  · You have increasing pain, redness, and swelling on skin near your anus  © 2017 [employer] Information is for End User's use only and may not be sold, redistributed or otherwise used for commercial purposes  All illustrations and images included in CareNotes® are the copyrighted property of [employer] , [employer]  or [employer]  The above information is an  only  It is not intended as medical advice for individual conditions or treatments  Talk to your doctor, nurse or pharmacist before following any medical regimen to see if it is safe and effective for you

## 2019-03-14 NOTE — ANESTHESIA POSTPROCEDURE EVALUATION
Post-Op Assessment Note    CV Status:  Stable  Pain Score: 0    Pain management: adequate     Mental Status:  Awake and alert   Hydration Status:  Stable   PONV Controlled:  None   Airway Patency:  Patent  Airway: intubated   Post Op Vitals Reviewed: Yes      Staff: Anesthesiologist           /58 (03/14/19 0912)    Temp (!) 97 2 °F (36 2 °C) (03/14/19 0912)    Pulse 86 (03/14/19 0912)   Resp 12 (03/14/19 0912)    SpO2 92 % (03/14/19 0912)

## 2019-03-14 NOTE — PLAN OF CARE
Problem: DISCHARGE PLANNING - CARE MANAGEMENT  Goal: Discharge to post-acute care or home with appropriate resources  Description  INTERVENTIONS:  - Conduct assessment to determine patient/family and health care team treatment goals, and need for post-acute services based on payer coverage, community resources, and patient preferences, and barriers to discharge  - Address psychosocial, clinical, and financial barriers to discharge as identified in assessment in conjunction with the patient/family and health care team  - Arrange appropriate level of post-acute services according to patient's   needs and preference and payer coverage in collaboration with the physician and health care team  - Communicate with and update the patient/family, physician, and health care team regarding progress on the discharge plan  - Arrange appropriate transportation to post-acute venues   Outcome: Completed  Note:   Pt to be discharged home with no needs  Wife will transport home

## 2019-03-15 ENCOUNTER — TRANSITIONAL CARE MANAGEMENT (OUTPATIENT)
Dept: INTERNAL MEDICINE CLINIC | Facility: CLINIC | Age: 34
End: 2019-03-15

## 2019-03-15 NOTE — DISCHARGE SUMMARY
Discharge- Ly Hernandez 1985, 29 y o  male MRN: [id number]    Unit/Bed#:  Encounter: [id number]    Primary Care Provider: Tressa Garcia DO   Date and time admitted to hospital: 3/12/2019  2:32 PM              Assessment/Plan:     Hospital Problem List:      Principal Problem:    Anal fistula  Active Problems:     Perirectal abscess    History of rectal abscess    Anal abscess        Plan for the Primary Problem(s):  1   Perianal fistula- chronic perianal fistula complicated by 4 recurrent abscess status post multiple drainage, history of seton placement  Presented  with perianal fistula and abscess with spontaneous purulent drainage  Recent colonoscopy was negative for Crohn's disease  S/p fistulotomy on 03/14/19  No signs of systemic infection present  Nontoxic appearing  D/c abx  Hemodynamically stable to discharge home  Colorectal surgeon recommended frequent Sitz bath, keeping perianal area clean, pain management with Toradol, Percocet, close outpatient follow-up  Patient reports pain is well controlled and not asking for opiates on discharge  Recommended follow-up with GI outpatient as well       2  Perirectal abscess:  Spontaneous drainage of perirectal abscess  Plan as per above      3   GERD- on omeprazole    Discharging Physician / Practitioner: Dirk Ceron MD  PCP: Tressa Garcia DO  Admission Date:   Admission Orders (From admission, onward)    Ordered        03/12/19 1820  Inpatient Admission  Once     Order ID Start Status   [id number] 03/12/19 1820 Completed              Discharge Date: 03/14/19    Resolved Problems  Date Reviewed: 3/14/2019    None          Consultations During Hospital Stay:  · Inpatient neurology, colorectal surgery    Procedures Performed:     Fistulotomy on 12/49/6806    Complications:  none    Reason for Admission: perianal fistula, perianal abscess with spontaneous drainage      Hospital Course:     Ly Hernandez is a 29 y o  male patient with pmhx for chronic perianal fistula complicated by 4 recurrent abscess status post multiple drainage, history of seton placement  who originally presented to the hospital on 3/12/2019 due to  perianal fistula and abscess with spontaneous purulent drainage  Recent colonoscopy was negative for Crohn's disease  On presentation no signs of systemic illness noted  No significant labs abnormalities noted  Pt has Fistulotomy on 03/14/19  Post procedure asymptomatic low bp noted that resolved without interventions  Currently pt reports feeling fine  Denies chest pain, fever, chills, nausea, vomiting, dizziness  Denies any other complaints  eager to go home  Colorectal surgeon recommends pain control, sitz bath, keeping the anal area clean and close follow up out pt  Also recommended to follow up with GI and PCP out pt  Pt  And spouse both understands and agrees with above mentioned plan  No other events reported  Refer to earlier notes for further clarification  Please see above list of diagnoses and related plan for additional information  Condition at Discharge: good     Discharge Day Visit / Exam:     Subjective: s/p fistulotomy  pt reports feeling fine  Denies chest pain, fever, chills, nausea, vomiting, dizziness  Denies any other complaints  eager to go home  No other events reported  Vitals: Blood Pressure: 117/55 (03/14/19 1608)  Pulse: 69 (03/14/19 1608)  Temperature: 98 5 °F (36 9 °C) (03/14/19 1608)  Temp Source: Oral (03/14/19 1608)  Respirations: 18 (03/14/19 1608)  Height: 5' 11" (180 3 cm) (03/12/19 2315)  Weight - Scale: 90 2 kg (198 lb 13 7 oz) (03/12/19 2315)  SpO2: 97 % (03/14/19 1100)  Exam:   Physical Exam   Constitutional: He is oriented to person, place, and time  No distress  HENT:   Head: Normocephalic and atraumatic  Eyes: Pupils are equal, round, and reactive to light  EOM are normal    Neck: Normal range of motion  Neck supple     Cardiovascular: Normal rate and regular rhythm  Pulmonary/Chest: Effort normal and breath sounds normal  No stridor  No respiratory distress  He has no wheezes  Abdominal: Soft  Bowel sounds are normal  He exhibits no distension  There is no tenderness  Musculoskeletal: Normal range of motion  Neurological: He is alert and oriented to person, place, and time  Psychiatric: He has a normal mood and affect  Discussion with Family: spouse present at bedside, answered all questions appropriately  Discharge instructions/Information to patient and family:   See after visit summary for information provided to patient and family  Provisions for Follow-Up Care:  See after visit summary for information related to follow-up care and any pertinent home health orders  Disposition:     Home    For Discharges to   [address] [facility]:   · Not Applicable to this Patient - Not Applicable to this Patient    Planned Readmission: none     Discharge Statement:  I spent 35 minutes discharging the patient  This time was spent on the day of discharge  I had direct contact with the patient on the day of discharge  Greater than 50% of the total time was spent examining patient, answering all patient questions, arranging and discussing plan of care with patient as well as directly providing post-discharge instructions  Additional time then spent on discharge activities  Discharge Medications:  See after visit summary for reconciled discharge medications provided to patient and family        ** Please Note: This note has been constructed using a voice recognition system **

## 2019-03-15 NOTE — UTILIZATION REVIEW
Notification of Discharge  This is a Notification of Discharge from our facility [address]  Please be advised that this patient has been discharge from our facility  Below you will find the admission and discharge date and time including the patients disposition  PRESENTATION DATE: 3/12/2019  2:32 PM  IP ADMISSION DATE: 3/12/19 1820  DISCHARGE DATE: 3/14/2019  4:26 PM  DISPOSITION: [id number] Utilization Review Department  Phone: [phone]; Fax [phone]  [email or web address]  ATTENTION: Please call with any questions or concerns to [phone]  and carefully listen to the prompts so that you are directed to the right person  Send all requests for admission clinical reviews, approved or denied determinations and any other requests to fax [phone]   All voicemails are confidential

## 2019-03-16 ENCOUNTER — APPOINTMENT (EMERGENCY)
Dept: CT IMAGING | Facility: HOSPITAL | Age: 34
End: 2019-03-16
Payer: COMMERCIAL

## 2019-03-16 ENCOUNTER — HOSPITAL ENCOUNTER (EMERGENCY)
Facility: HOSPITAL | Age: 34
Discharge: HOME | End: 2019-03-17
Attending: EMERGENCY MEDICINE
Payer: COMMERCIAL

## 2019-03-16 DIAGNOSIS — K20.90 ESOPHAGITIS: ICD-10-CM

## 2019-03-16 DIAGNOSIS — R11.2 NAUSEA AND VOMITING: Primary | ICD-10-CM

## 2019-03-16 LAB
ALBUMIN SERPL BCP-MCNC: 4 G/DL (ref 3.5–5)
ALP SERPL-CCNC: 81 U/L (ref 46–116)
ALT SERPL W P-5'-P-CCNC: 28 U/L (ref 12–78)
ANION GAP SERPL CALCULATED.3IONS-SCNC: 7 MMOL/L (ref 4–13)
AST SERPL W P-5'-P-CCNC: 15 U/L (ref 5–45)
BASOPHILS # BLD AUTO: 0.05 THOUSANDS/ΜL (ref 0–0.1)
BASOPHILS NFR BLD AUTO: 0 % (ref 0–1)
BILIRUB SERPL-MCNC: 0.5 MG/DL (ref 0.2–1)
BUN SERPL-MCNC: 9 MG/DL (ref 5–25)
CALCIUM SERPL-MCNC: 9.4 MG/DL (ref 8.3–10.1)
CHLORIDE SERPL-SCNC: 103 MMOL/L (ref 100–108)
CO2 SERPL-SCNC: 31 MMOL/L (ref 21–32)
CREAT SERPL-MCNC: 0.91 MG/DL (ref 0.6–1.3)
EOSINOPHIL # BLD AUTO: 0.31 THOUSAND/ΜL (ref 0–0.61)
EOSINOPHIL NFR BLD AUTO: 2 % (ref 0–6)
ERYTHROCYTE [DISTWIDTH] IN BLOOD BY AUTOMATED COUNT: 13.1 % (ref 11.6–15.1)
GFR SERPL CREATININE-BSD FRML MDRD: 111 ML/MIN/1.73SQ M
GLUCOSE SERPL-MCNC: 88 MG/DL (ref 65–140)
HCT VFR BLD AUTO: 44.2 % (ref 36.5–49.3)
HGB BLD-MCNC: 14.9 G/DL (ref 12–17)
IMM GRANULOCYTES # BLD AUTO: 0.05 THOUSAND/UL (ref 0–0.2)
IMM GRANULOCYTES NFR BLD AUTO: 0 % (ref 0–2)
LIPASE SERPL-CCNC: 67 U/L (ref 73–393)
LYMPHOCYTES # BLD AUTO: 3.23 THOUSANDS/ΜL (ref 0.6–4.47)
LYMPHOCYTES NFR BLD AUTO: 24 % (ref 14–44)
MCH RBC QN AUTO: 31.9 PG (ref 26.8–34.3)
MCHC RBC AUTO-ENTMCNC: 33.7 G/DL (ref 31.4–37.4)
MCV RBC AUTO: 95 FL (ref 82–98)
MONOCYTES # BLD AUTO: 1.21 THOUSAND/ΜL (ref 0.17–1.22)
MONOCYTES NFR BLD AUTO: 9 % (ref 4–12)
NEUTROPHILS # BLD AUTO: 8.61 THOUSANDS/ΜL (ref 1.85–7.62)
NEUTS SEG NFR BLD AUTO: 65 % (ref 43–75)
NRBC BLD AUTO-RTO: 0 /100 WBCS
PLATELET # BLD AUTO: 259 THOUSANDS/UL (ref 149–390)
PMV BLD AUTO: 9.5 FL (ref 8.9–12.7)
POTASSIUM SERPL-SCNC: 3.5 MMOL/L (ref 3.5–5.3)
PROT SERPL-MCNC: 7.6 G/DL (ref 6.4–8.2)
RBC # BLD AUTO: 4.67 MILLION/UL (ref 3.88–5.62)
SODIUM SERPL-SCNC: 141 MMOL/L (ref 136–145)
WBC # BLD AUTO: 13.46 THOUSAND/UL (ref 4.31–10.16)

## 2019-03-16 PROCEDURE — 96374 THER/PROPH/DIAG INJ IV PUSH: CPT

## 2019-03-16 PROCEDURE — 83690 ASSAY OF LIPASE: CPT | Performed by: EMERGENCY MEDICINE

## 2019-03-16 PROCEDURE — 36415 COLL VENOUS BLD VENIPUNCTURE: CPT | Performed by: EMERGENCY MEDICINE

## 2019-03-16 PROCEDURE — 96376 TX/PRO/DX INJ SAME DRUG ADON: CPT

## 2019-03-16 PROCEDURE — C9113 INJ PANTOPRAZOLE SODIUM, VIA: HCPCS | Performed by: NURSE PRACTITIONER

## 2019-03-16 PROCEDURE — 74177 CT ABD & PELVIS W/CONTRAST: CPT

## 2019-03-16 PROCEDURE — 85025 COMPLETE CBC W/AUTO DIFF WBC: CPT | Performed by: EMERGENCY MEDICINE

## 2019-03-16 PROCEDURE — 96375 TX/PRO/DX INJ NEW DRUG ADDON: CPT

## 2019-03-16 PROCEDURE — 99284 EMERGENCY DEPT VISIT MOD MDM: CPT

## 2019-03-16 PROCEDURE — 80053 COMPREHEN METABOLIC PANEL: CPT | Performed by: EMERGENCY MEDICINE

## 2019-03-16 PROCEDURE — 71260 CT THORAX DX C+: CPT

## 2019-03-16 RX ORDER — HYDROMORPHONE HCL/PF 1 MG/ML
1 SYRINGE (ML) INJECTION ONCE
Status: COMPLETED | OUTPATIENT
Start: 2019-03-16 | End: 2019-03-16

## 2019-03-16 RX ORDER — PANTOPRAZOLE SODIUM 40 MG/1
40 INJECTION, POWDER, FOR SOLUTION INTRAVENOUS ONCE
Status: COMPLETED | OUTPATIENT
Start: 2019-03-16 | End: 2019-03-16

## 2019-03-16 RX ORDER — MAGNESIUM HYDROXIDE/ALUMINUM HYDROXICE/SIMETHICONE 120; 1200; 1200 MG/30ML; MG/30ML; MG/30ML
30 SUSPENSION ORAL ONCE
Status: COMPLETED | OUTPATIENT
Start: 2019-03-16 | End: 2019-03-16

## 2019-03-16 RX ORDER — METOCLOPRAMIDE HYDROCHLORIDE 5 MG/ML
5 INJECTION INTRAMUSCULAR; INTRAVENOUS ONCE
Status: COMPLETED | OUTPATIENT
Start: 2019-03-16 | End: 2019-03-16

## 2019-03-16 RX ORDER — DIPHENHYDRAMINE HYDROCHLORIDE 50 MG/ML
12.5 INJECTION INTRAMUSCULAR; INTRAVENOUS ONCE
Status: COMPLETED | OUTPATIENT
Start: 2019-03-16 | End: 2019-03-16

## 2019-03-16 RX ORDER — ONDANSETRON 2 MG/ML
4 INJECTION INTRAMUSCULAR; INTRAVENOUS ONCE
Status: COMPLETED | OUTPATIENT
Start: 2019-03-16 | End: 2019-03-16

## 2019-03-16 RX ADMIN — PANTOPRAZOLE SODIUM 40 MG: 40 INJECTION, POWDER, FOR SOLUTION INTRAVENOUS at 22:20

## 2019-03-16 RX ADMIN — ALUMINUM HYDROXIDE, MAGNESIUM HYDROXIDE, AND SIMETHICONE 30 ML: 200; 200; 20 SUSPENSION ORAL at 22:20

## 2019-03-16 RX ADMIN — LIDOCAINE HYDROCHLORIDE 10 ML: 20 SOLUTION ORAL; TOPICAL at 22:20

## 2019-03-16 RX ADMIN — HYDROMORPHONE HYDROCHLORIDE 1 MG: 1 INJECTION, SOLUTION INTRAMUSCULAR; INTRAVENOUS; SUBCUTANEOUS at 23:00

## 2019-03-16 RX ADMIN — DIPHENHYDRAMINE HYDROCHLORIDE 12.5 MG: 50 INJECTION INTRAMUSCULAR; INTRAVENOUS at 23:00

## 2019-03-16 RX ADMIN — METOCLOPRAMIDE 5 MG: 5 INJECTION, SOLUTION INTRAMUSCULAR; INTRAVENOUS at 23:00

## 2019-03-16 RX ADMIN — IOHEXOL 100 ML: 350 INJECTION, SOLUTION INTRAVENOUS at 23:55

## 2019-03-16 RX ADMIN — HYDROMORPHONE HYDROCHLORIDE 1 MG: 1 INJECTION, SOLUTION INTRAMUSCULAR; INTRAVENOUS; SUBCUTANEOUS at 22:20

## 2019-03-16 RX ADMIN — ONDANSETRON 4 MG: 2 INJECTION INTRAMUSCULAR; INTRAVENOUS at 22:20

## 2019-03-17 VITALS
BODY MASS INDEX: 28.55 KG/M2 | WEIGHT: 203.93 LBS | HEIGHT: 71 IN | DIASTOLIC BLOOD PRESSURE: 54 MMHG | TEMPERATURE: 98 F | HEART RATE: 61 BPM | OXYGEN SATURATION: 100 % | SYSTOLIC BLOOD PRESSURE: 116 MMHG | RESPIRATION RATE: 16 BRPM

## 2019-03-17 PROCEDURE — 96361 HYDRATE IV INFUSION ADD-ON: CPT

## 2019-03-17 RX ORDER — OXYCODONE HYDROCHLORIDE AND ACETAMINOPHEN 5; 325 MG/1; MG/1
1 TABLET ORAL EVERY 6 HOURS PRN
Qty: 20 TABLET | Refills: 0 | Status: SHIPPED | OUTPATIENT
Start: 2019-03-17 | End: 2019-04-29 | Stop reason: ALTCHOICE

## 2019-03-17 RX ORDER — SUCRALFATE ORAL 1 G/10ML
1 SUSPENSION ORAL 4 TIMES DAILY
Qty: 420 ML | Refills: 0 | Status: SHIPPED | OUTPATIENT
Start: 2019-03-17 | End: 2019-04-29 | Stop reason: ALTCHOICE

## 2019-03-17 RX ORDER — PANTOPRAZOLE SODIUM 20 MG/1
20 TABLET, DELAYED RELEASE ORAL 2 TIMES DAILY
Qty: 60 TABLET | Refills: 0 | Status: SHIPPED | OUTPATIENT
Start: 2019-03-17 | End: 2019-04-29 | Stop reason: ALTCHOICE

## 2019-03-17 RX ADMIN — SODIUM CHLORIDE 1000 ML: 0.9 INJECTION, SOLUTION INTRAVENOUS at 00:01

## 2019-03-17 NOTE — ED NOTES
Pt reports vomiting blood, SOB and pain  Pt states that he is in the process of meeting with a surgeon about hernia repair  Pt also states that he was just released from [place] in patient on Thursday after having a rectal fistula repair        Paul Nash RN  03/16/19 5137

## 2019-03-17 NOTE — ED PROVIDER NOTES
History  Chief Complaint   Patient presents with   Ashley Talamantes     states he has a hiatal hernia that has been causing him to vomit blood and feel SOB      80-year-old male patient presents here with a chief complaint of vomiting reports that he has a history of GERD, esophagitis, hiatal hernia, ulcers a believe that they are acting up  Symptoms are primarily present today he has been reportedly vomiting throughout the day  Denies fever chills or bad food intake  He does not appear toxic  States he vomited some blood with streaks of blood in the mucus  He has had intractable vomiting in the past   Will treat his symptoms and likely give him medications to reduce acid project stomach  Prior to Admission Medications   Prescriptions Last Dose Informant Patient Reported? Taking?   acetaminophen (TYLENOL) 325 mg tablet   No No   Sig: Take 2 tablets (650 mg total) by mouth every 6 (six) hours as needed for fever   hydrocortisone-pramoxine (ANALPRAM-HC) 2 5-1 % rectal cream   No No   Sig: Insert into the rectum 3 (three) times a day   ketorolac (TORADOL) 10 mg tablet   No No   Sig: Take 1 tablet (10 mg total) by mouth every 6 (six) hours as needed for moderate pain      Facility-Administered Medications: None       History reviewed  No pertinent past medical history  Past Surgical History:   Procedure Laterality Date    ABCESS DRAINAGE  10/29/2018    carlos anal    ANAL FISTULOTOMY N/A 3/14/2019    Procedure: FISTULOTOMY;  Surgeon: Tilton Nissen, MD;  [facility];  Service: Colorectal    ID COLONOSCOPY FLX DX W/COLLJ SPEC WHEN PFRMD N/A 11/21/2018    Procedure: COLONOSCOPY;  Surgeon: Renata Regalado MD;  Location: [facility]; Service: Gastroenterology    ID ESOPHAGOGASTRODUODENOSCOPY TRANSORAL DIAGNOSTIC N/A 11/21/2018    Procedure: ESOPHAGOGASTRODUODENOSCOPY (EGD); Surgeon: eRnata Regalado MD;  Location: [facility];   Service: Gastroenterology    ID ESOPHAGOGASTRODUODENOSCOPY TRANSORAL DIAGNOSTIC N/A 2/7/2019    Procedure: ESOPHAGOGASTRODUODENOSCOPY (EGD); Surgeon: Dionne Dyer MD;  Location[facility]; Service: Gastroenterology    TONSILLECTOMY         Family History   Problem Relation Age of Onset    Diabetes Father      I have reviewed and agree with the history as documented  Social History     Tobacco Use    Smoking status: Current Every Day Smoker     Packs/day: 0 50    Smokeless tobacco: Never Used   Substance Use Topics    Alcohol use: No    Drug use: Yes     Frequency: 2 0 times per week     Types: Marijuana        Review of Systems   Constitutional: Negative for diaphoresis, fatigue and fever  HENT: Negative for congestion, ear pain, nosebleeds and sore throat  Eyes: Negative for photophobia, pain, discharge and visual disturbance  Respiratory: Negative for cough, choking, chest tightness, shortness of breath and wheezing  Cardiovascular: Negative for chest pain and palpitations  Gastrointestinal: Positive for abdominal pain, nausea and vomiting  Negative for abdominal distention and diarrhea  Genitourinary: Negative for dysuria, flank pain and frequency  Musculoskeletal: Negative for back pain, gait problem and joint swelling  Skin: Negative for color change and rash  Neurological: Negative for dizziness, syncope and headaches  Psychiatric/Behavioral: Negative for behavioral problems and confusion  The patient is not nervous/anxious  All other systems reviewed and are negative  Physical Exam  Physical Exam   Constitutional: He is oriented to person, place, and time  He appears well-developed and well-nourished  HENT:   Head: Normocephalic and atraumatic  Eyes: Pupils are equal, round, and reactive to light  Neck: Normal range of motion  Neck supple  Cardiovascular: Normal rate, regular rhythm, normal heart sounds and normal pulses  PMI is not displaced     Pulmonary/Chest: Effort normal and breath sounds normal  No respiratory distress  Abdominal: Soft  He exhibits no distension  There is no guarding  Musculoskeletal: Normal range of motion  Lymphadenopathy:     He has no cervical adenopathy  Neurological: He is alert and oriented to person, place, and time  Skin: Skin is warm and dry  No rash noted  He is not diaphoretic  No pallor  Psychiatric: He has a normal mood and affect  Vitals reviewed        Vital Signs  ED Triage Vitals   Temperature Pulse Respirations Blood Pressure SpO2   03/16/19 2130 03/16/19 2129 03/16/19 2129 03/16/19 2129 03/16/19 2129   98 °F (36 7 °C) 90 18 138/83 99 %      Temp Source Heart Rate Source Patient Position - Orthostatic VS BP Location FiO2 (%)   03/16/19 2130 03/16/19 2129 03/16/19 2129 03/16/19 2129 --   Oral Monitor Sitting Left arm       Pain Score       03/16/19 2129       8           Vitals:    03/16/19 2129   BP: 138/83   Pulse: 90   Patient Position - Orthostatic VS: Sitting       qSOFA     Row Name 03/16/19 2129                Altered mental status GCS < 15  --        Respiratory Rate > / =69  0        Systolic BP < / =613  0        Q Sofa Score  0              Visual Acuity      ED Medications  Medications   sodium chloride 0 9 % bolus 1,000 mL (1,000 mL Intravenous New Bag 3/17/19 0001)   HYDROmorphone (DILAUDID) injection 1 mg (1 mg Intravenous Given 3/16/19 2220)   ondansetron (ZOFRAN) injection 4 mg (4 mg Intravenous Given 3/16/19 2220)   pantoprazole (PROTONIX) injection 40 mg (40 mg Intravenous Given 3/16/19 2220)   aluminum-magnesium hydroxide-simethicone (MYLANTA) 200-200-20 mg/5 mL oral suspension 30 mL (30 mL Oral Given 3/16/19 2220)   lidocaine viscous (XYLOCAINE) 2 % mucosal solution 10 mL (10 mL Swish & Swallow Given 3/16/19 2220)   HYDROmorphone (DILAUDID) injection 1 mg (1 mg Intravenous Given 3/16/19 2300)   metoclopramide (REGLAN) injection 5 mg (5 mg Intravenous Given 3/16/19 2300)   diphenhydrAMINE (BENADRYL) injection 12 5 mg (12 5 mg Intravenous Given 3/16/19 2300)   iohexol (OMNIPAQUE) 350 MG/ML injection (MULTI-DOSE) 100 mL (100 mL Intravenous Given 3/16/19 0705)       Diagnostic Studies  Results Reviewed     Procedure Component Value Units Date/Time    Comprehensive metabolic panel [[id number]] Collected:  03/16/19 2221    Lab Status:  Final result Specimen:  Blood from Arm, Right Updated:  03/16/19 2245     Sodium 141 mmol/L      Potassium 3 5 mmol/L      Chloride 103 mmol/L      CO2 31 mmol/L      ANION GAP 7 mmol/L      BUN 9 mg/dL      Creatinine 0 91 mg/dL      Glucose 88 mg/dL      Calcium 9 4 mg/dL      AST 15 U/L      ALT 28 U/L      Alkaline Phosphatase 81 U/L      Total Protein 7 6 g/dL      Albumin 4 0 g/dL      Total Bilirubin 0 50 mg/dL      eGFR 111 ml/min/1 73sq m     Narrative:       National Kidney Disease Education Program recommendations are as follows:  GFR calculation is accurate only with a steady state creatinine  Chronic Kidney disease less than 60 ml/min/1 73 sq  meters  Kidney failure less than 15 ml/min/1 73 sq  meters      Lipase [[id number]]  (Abnormal) Collected:  03/16/19 2221    Lab Status:  Final result Specimen:  Blood from Arm, Right Updated:  03/16/19 2245     Lipase 67 u/L     CBC and differential [[id number]]  (Abnormal) Collected:  03/16/19 2221    Lab Status:  Final result Specimen:  Blood from Arm, Right Updated:  03/16/19 2227     WBC 13 46 Thousand/uL      RBC 4 67 Million/uL      Hemoglobin 14 9 g/dL      Hematocrit 44 2 %      MCV 95 fL      MCH 31 9 pg      MCHC 33 7 g/dL      RDW 13 1 %      MPV 9 5 fL      Platelets 493 Thousands/uL      nRBC 0 /100 WBCs      Neutrophils Relative 65 %      Immat GRANS % 0 %      Lymphocytes Relative 24 %      Monocytes Relative 9 %      Eosinophils Relative 2 %      Basophils Relative 0 %      Neutrophils Absolute 8 61 Thousands/µL      Immature Grans Absolute 0 05 Thousand/uL      Lymphocytes Absolute 3 23 Thousands/µL      Monocytes Absolute 1 21 Thousand/µL      Eosinophils Absolute 0 31 Thousand/µL      Basophils Absolute 0 05 Thousands/µL                  CT chest abdomen pelvis w contrast    (Results Pending)              Procedures  Procedures       Phone Contacts  ED Phone Contact    ED Course                               MDM      Disposition  Final diagnoses:   Nausea and vomiting     Time reflects when diagnosis was documented in both MDM as applicable and the Disposition within this note     Time User Action Codes Description Comment    3/17/2019 12:33 AM Myron Mcintyre Add [R11 2] Nausea and vomiting       ED Disposition     None      Follow-up Information    None         Patient's Medications   Discharge Prescriptions    OXYCODONE-ACETAMINOPHEN (PERCOCET) 5-325 MG PER TABLET    Take 1 tablet by mouth every 6 (six) hours as needed for severe pain for up to 20 dosesMax Daily Amount: 4 tablets       Start Date: 3/17/2019 End Date: --       Order Dose: 1 tablet       Quantity: 20 tablet    Refills: 0    PANTOPRAZOLE (PROTONIX) 20 MG TABLET    Take 1 tablet (20 mg total) by mouth 2 (two) times a day for 30 days       Start Date: 3/17/2019 End Date: 4/16/2019       Order Dose: 20 mg       Quantity: 60 tablet    Refills: 0    SUCRALFATE (CARAFATE) 1 G/10 ML SUSPENSION    Take 10 mL (1 g total) by mouth 4 (four) times a day for 14 days       Start Date: 3/17/2019 End Date: 3/31/2019       Order Dose: 1 g       Quantity: 420 mL    Refills: 0     No discharge procedures on file      ED Provider  Electronically Signed by           GORDY Brewer  03/16/19 Evan Moore [place]  03/17/19 0079

## 2019-03-29 ENCOUNTER — HOSPITAL ENCOUNTER (OUTPATIENT)
Dept: NUCLEAR MEDICINE | Facility: HOSPITAL | Age: 34
Discharge: HOME | End: 2019-03-29
Attending: INTERNAL MEDICINE
Payer: COMMERCIAL

## 2019-03-29 DIAGNOSIS — R11.10 VOMITING, INTRACTABILITY OF VOMITING NOT SPECIFIED, PRESENCE OF NAUSEA NOT SPECIFIED, UNSPECIFIED VOMITING TYPE: ICD-10-CM

## 2019-03-29 PROCEDURE — A9541 TC99M SULFUR COLLOID: HCPCS

## 2019-03-29 PROCEDURE — 78264 GASTRIC EMPTYING IMG STUDY: CPT

## 2019-04-29 ENCOUNTER — APPOINTMENT (EMERGENCY)
Dept: CT IMAGING | Facility: HOSPITAL | Age: 34
End: 2019-04-29
Payer: COMMERCIAL

## 2019-04-29 ENCOUNTER — HOSPITAL ENCOUNTER (EMERGENCY)
Facility: HOSPITAL | Age: 34
Discharge: HOME | End: 2019-04-29
Attending: EMERGENCY MEDICINE
Payer: COMMERCIAL

## 2019-04-29 VITALS
WEIGHT: 204.37 LBS | HEIGHT: 71 IN | HEART RATE: 73 BPM | BODY MASS INDEX: 28.61 KG/M2 | OXYGEN SATURATION: 100 % | SYSTOLIC BLOOD PRESSURE: 148 MMHG | TEMPERATURE: 97.7 F | RESPIRATION RATE: 16 BRPM | DIASTOLIC BLOOD PRESSURE: 66 MMHG

## 2019-04-29 DIAGNOSIS — K60.4 PERIRECTAL FISTULA: Primary | ICD-10-CM

## 2019-04-29 LAB
ALBUMIN SERPL BCP-MCNC: 4 G/DL (ref 3.5–5)
ALP SERPL-CCNC: 83 U/L (ref 46–116)
ALT SERPL W P-5'-P-CCNC: 29 U/L (ref 12–78)
ANION GAP SERPL CALCULATED.3IONS-SCNC: 6 MMOL/L (ref 4–13)
AST SERPL W P-5'-P-CCNC: 19 U/L (ref 5–45)
BASOPHILS # BLD AUTO: 0.06 THOUSANDS/ΜL (ref 0–0.1)
BASOPHILS NFR BLD AUTO: 1 % (ref 0–1)
BILIRUB SERPL-MCNC: 0.3 MG/DL (ref 0.2–1)
BUN SERPL-MCNC: 10 MG/DL (ref 5–25)
CALCIUM SERPL-MCNC: 9.1 MG/DL (ref 8.3–10.1)
CHLORIDE SERPL-SCNC: 104 MMOL/L (ref 100–108)
CO2 SERPL-SCNC: 29 MMOL/L (ref 21–32)
CREAT SERPL-MCNC: 0.84 MG/DL (ref 0.6–1.3)
EOSINOPHIL # BLD AUTO: 0.27 THOUSAND/ΜL (ref 0–0.61)
EOSINOPHIL NFR BLD AUTO: 3 % (ref 0–6)
ERYTHROCYTE [DISTWIDTH] IN BLOOD BY AUTOMATED COUNT: 12.8 % (ref 11.6–15.1)
GFR SERPL CREATININE-BSD FRML MDRD: 116 ML/MIN/1.73SQ M
GLUCOSE SERPL-MCNC: 93 MG/DL (ref 65–140)
HCT VFR BLD AUTO: 41 % (ref 36.5–49.3)
HGB BLD-MCNC: 14 G/DL (ref 12–17)
IMM GRANULOCYTES # BLD AUTO: 0.02 THOUSAND/UL (ref 0–0.2)
IMM GRANULOCYTES NFR BLD AUTO: 0 % (ref 0–2)
LYMPHOCYTES # BLD AUTO: 2.76 THOUSANDS/ΜL (ref 0.6–4.47)
LYMPHOCYTES NFR BLD AUTO: 28 % (ref 14–44)
MCH RBC QN AUTO: 32.4 PG (ref 26.8–34.3)
MCHC RBC AUTO-ENTMCNC: 34.1 G/DL (ref 31.4–37.4)
MCV RBC AUTO: 95 FL (ref 82–98)
MONOCYTES # BLD AUTO: 0.73 THOUSAND/ΜL (ref 0.17–1.22)
MONOCYTES NFR BLD AUTO: 7 % (ref 4–12)
NEUTROPHILS # BLD AUTO: 6.17 THOUSANDS/ΜL (ref 1.85–7.62)
NEUTS SEG NFR BLD AUTO: 61 % (ref 43–75)
NRBC BLD AUTO-RTO: 0 /100 WBCS
PLATELET # BLD AUTO: 276 THOUSANDS/UL (ref 149–390)
PMV BLD AUTO: 9.4 FL (ref 8.9–12.7)
POTASSIUM SERPL-SCNC: 4 MMOL/L (ref 3.5–5.3)
PROT SERPL-MCNC: 7.2 G/DL (ref 6.4–8.2)
RBC # BLD AUTO: 4.32 MILLION/UL (ref 3.88–5.62)
SODIUM SERPL-SCNC: 139 MMOL/L (ref 136–145)
WBC # BLD AUTO: 10.01 THOUSAND/UL (ref 4.31–10.16)

## 2019-04-29 PROCEDURE — 96361 HYDRATE IV INFUSION ADD-ON: CPT

## 2019-04-29 PROCEDURE — 36415 COLL VENOUS BLD VENIPUNCTURE: CPT | Performed by: PHYSICIAN ASSISTANT

## 2019-04-29 PROCEDURE — 85025 COMPLETE CBC W/AUTO DIFF WBC: CPT | Performed by: PHYSICIAN ASSISTANT

## 2019-04-29 PROCEDURE — 74177 CT ABD & PELVIS W/CONTRAST: CPT

## 2019-04-29 PROCEDURE — 96374 THER/PROPH/DIAG INJ IV PUSH: CPT

## 2019-04-29 PROCEDURE — 87040 BLOOD CULTURE FOR BACTERIA: CPT | Performed by: PHYSICIAN ASSISTANT

## 2019-04-29 PROCEDURE — 96375 TX/PRO/DX INJ NEW DRUG ADDON: CPT

## 2019-04-29 PROCEDURE — 99283 EMERGENCY DEPT VISIT LOW MDM: CPT | Performed by: PHYSICIAN ASSISTANT

## 2019-04-29 PROCEDURE — 80053 COMPREHEN METABOLIC PANEL: CPT | Performed by: PHYSICIAN ASSISTANT

## 2019-04-29 PROCEDURE — 99284 EMERGENCY DEPT VISIT MOD MDM: CPT

## 2019-04-29 RX ORDER — ONDANSETRON 4 MG/1
4 TABLET, ORALLY DISINTEGRATING ORAL EVERY 8 HOURS PRN
Qty: 15 TABLET | Refills: 0 | Status: SHIPPED | OUTPATIENT
Start: 2019-04-29 | End: 2019-07-26

## 2019-04-29 RX ORDER — OXYCODONE HYDROCHLORIDE AND ACETAMINOPHEN 5; 325 MG/1; MG/1
1 TABLET ORAL EVERY 8 HOURS PRN
Qty: 12 TABLET | Refills: 0 | Status: SHIPPED | OUTPATIENT
Start: 2019-04-29 | End: 2019-07-26

## 2019-04-29 RX ORDER — HYDROMORPHONE HCL/PF 1 MG/ML
0.5 SYRINGE (ML) INJECTION ONCE
Status: COMPLETED | OUTPATIENT
Start: 2019-04-29 | End: 2019-04-29

## 2019-04-29 RX ORDER — ONDANSETRON 2 MG/ML
4 INJECTION INTRAMUSCULAR; INTRAVENOUS ONCE
Status: COMPLETED | OUTPATIENT
Start: 2019-04-29 | End: 2019-04-29

## 2019-04-29 RX ADMIN — HYDROMORPHONE HYDROCHLORIDE 0.5 MG: 1 INJECTION, SOLUTION INTRAMUSCULAR; INTRAVENOUS; SUBCUTANEOUS at 20:02

## 2019-04-29 RX ADMIN — IOHEXOL 100 ML: 350 INJECTION, SOLUTION INTRAVENOUS at 20:50

## 2019-04-29 RX ADMIN — ONDANSETRON 4 MG: 2 INJECTION INTRAMUSCULAR; INTRAVENOUS at 20:02

## 2019-04-29 RX ADMIN — SODIUM CHLORIDE 1000 ML: 0.9 INJECTION, SOLUTION INTRAVENOUS at 20:01

## 2019-05-03 ENCOUNTER — OFFICE VISIT (OUTPATIENT)
Dept: GASTROENTEROLOGY | Facility: CLINIC | Age: 34
End: 2019-05-03
Payer: COMMERCIAL

## 2019-05-03 VITALS
BODY MASS INDEX: 28.7 KG/M2 | SYSTOLIC BLOOD PRESSURE: 120 MMHG | HEART RATE: 89 BPM | DIASTOLIC BLOOD PRESSURE: 70 MMHG | HEIGHT: 71 IN | WEIGHT: 205 LBS | RESPIRATION RATE: 16 BRPM

## 2019-05-03 DIAGNOSIS — K61.1 PERI-RECTAL ABSCESS: ICD-10-CM

## 2019-05-03 DIAGNOSIS — K62.89 RECTAL PAIN: Primary | ICD-10-CM

## 2019-05-03 DIAGNOSIS — R11.0 NAUSEA: ICD-10-CM

## 2019-05-03 PROCEDURE — 99214 OFFICE O/P EST MOD 30 MIN: CPT | Performed by: INTERNAL MEDICINE

## 2019-05-04 LAB
BACTERIA BLD CULT: NORMAL
BACTERIA BLD CULT: NORMAL

## 2019-07-10 ENCOUNTER — TELEPHONE (OUTPATIENT)
Dept: PAIN MEDICINE | Facility: CLINIC | Age: 34
End: 2019-07-10

## 2019-07-10 NOTE — TELEPHONE ENCOUNTER
Resent np paperwork to address on file of [address] [place] [address]

## 2019-07-10 NOTE — TELEPHONE ENCOUNTER
Patients wife is calling to say she has not received new patient paperwork in the mail yet  Please mail out to the address on file  Call back# [phone]

## 2019-07-12 ENCOUNTER — APPOINTMENT (EMERGENCY)
Dept: CT IMAGING | Facility: HOSPITAL | Age: 34
End: 2019-07-12
Payer: COMMERCIAL

## 2019-07-12 ENCOUNTER — HOSPITAL ENCOUNTER (EMERGENCY)
Facility: HOSPITAL | Age: 34
Discharge: HOME | End: 2019-07-12
Attending: EMERGENCY MEDICINE | Admitting: EMERGENCY MEDICINE
Payer: COMMERCIAL

## 2019-07-12 VITALS
SYSTOLIC BLOOD PRESSURE: 136 MMHG | HEART RATE: 80 BPM | RESPIRATION RATE: 16 BRPM | TEMPERATURE: 98.3 F | DIASTOLIC BLOOD PRESSURE: 62 MMHG | OXYGEN SATURATION: 98 %

## 2019-07-12 DIAGNOSIS — R10.33 PERIUMBILICAL PAIN: Primary | ICD-10-CM

## 2019-07-12 LAB
ALBUMIN SERPL BCP-MCNC: 3.9 G/DL (ref 3.5–5)
ALP SERPL-CCNC: 83 U/L (ref 46–116)
ALT SERPL W P-5'-P-CCNC: 23 U/L (ref 12–78)
ANION GAP SERPL CALCULATED.3IONS-SCNC: 7 MMOL/L (ref 4–13)
AST SERPL W P-5'-P-CCNC: 15 U/L (ref 5–45)
BASOPHILS # BLD AUTO: 0.06 THOUSANDS/ΜL (ref 0–0.1)
BASOPHILS NFR BLD AUTO: 1 % (ref 0–1)
BILIRUB DIRECT SERPL-MCNC: 0.13 MG/DL (ref 0–0.2)
BILIRUB SERPL-MCNC: 0.7 MG/DL (ref 0.2–1)
BUN SERPL-MCNC: 14 MG/DL (ref 5–25)
CALCIUM SERPL-MCNC: 9.4 MG/DL (ref 8.3–10.1)
CHLORIDE SERPL-SCNC: 105 MMOL/L (ref 100–108)
CO2 SERPL-SCNC: 30 MMOL/L (ref 21–32)
CREAT SERPL-MCNC: 0.97 MG/DL (ref 0.6–1.3)
EOSINOPHIL # BLD AUTO: 0.3 THOUSAND/ΜL (ref 0–0.61)
EOSINOPHIL NFR BLD AUTO: 4 % (ref 0–6)
ERYTHROCYTE [DISTWIDTH] IN BLOOD BY AUTOMATED COUNT: 12.8 % (ref 11.6–15.1)
GFR SERPL CREATININE-BSD FRML MDRD: 103 ML/MIN/1.73SQ M
GLUCOSE SERPL-MCNC: 95 MG/DL (ref 65–140)
HCT VFR BLD AUTO: 42.6 % (ref 36.5–49.3)
HGB BLD-MCNC: 14.5 G/DL (ref 12–17)
IMM GRANULOCYTES # BLD AUTO: 0.01 THOUSAND/UL (ref 0–0.2)
IMM GRANULOCYTES NFR BLD AUTO: 0 % (ref 0–2)
LIPASE SERPL-CCNC: 71 U/L (ref 73–393)
LYMPHOCYTES # BLD AUTO: 2.76 THOUSANDS/ΜL (ref 0.6–4.47)
LYMPHOCYTES NFR BLD AUTO: 33 % (ref 14–44)
MCH RBC QN AUTO: 32.2 PG (ref 26.8–34.3)
MCHC RBC AUTO-ENTMCNC: 34 G/DL (ref 31.4–37.4)
MCV RBC AUTO: 95 FL (ref 82–98)
MONOCYTES # BLD AUTO: 0.69 THOUSAND/ΜL (ref 0.17–1.22)
MONOCYTES NFR BLD AUTO: 8 % (ref 4–12)
NEUTROPHILS # BLD AUTO: 4.64 THOUSANDS/ΜL (ref 1.85–7.62)
NEUTS SEG NFR BLD AUTO: 54 % (ref 43–75)
NRBC BLD AUTO-RTO: 0 /100 WBCS
PLATELET # BLD AUTO: 261 THOUSANDS/UL (ref 149–390)
PMV BLD AUTO: 9.1 FL (ref 8.9–12.7)
POTASSIUM SERPL-SCNC: 4.2 MMOL/L (ref 3.5–5.3)
PROT SERPL-MCNC: 7.1 G/DL (ref 6.4–8.2)
RBC # BLD AUTO: 4.51 MILLION/UL (ref 3.88–5.62)
SODIUM SERPL-SCNC: 142 MMOL/L (ref 136–145)
WBC # BLD AUTO: 8.46 THOUSAND/UL (ref 4.31–10.16)

## 2019-07-12 PROCEDURE — 74177 CT ABD & PELVIS W/CONTRAST: CPT

## 2019-07-12 PROCEDURE — 36415 COLL VENOUS BLD VENIPUNCTURE: CPT | Performed by: PHYSICIAN ASSISTANT

## 2019-07-12 PROCEDURE — 85025 COMPLETE CBC W/AUTO DIFF WBC: CPT | Performed by: PHYSICIAN ASSISTANT

## 2019-07-12 PROCEDURE — 99284 EMERGENCY DEPT VISIT MOD MDM: CPT | Performed by: PHYSICIAN ASSISTANT

## 2019-07-12 PROCEDURE — 80048 BASIC METABOLIC PNL TOTAL CA: CPT | Performed by: PHYSICIAN ASSISTANT

## 2019-07-12 PROCEDURE — 83690 ASSAY OF LIPASE: CPT | Performed by: PHYSICIAN ASSISTANT

## 2019-07-12 PROCEDURE — 80076 HEPATIC FUNCTION PANEL: CPT | Performed by: PHYSICIAN ASSISTANT

## 2019-07-12 PROCEDURE — 99284 EMERGENCY DEPT VISIT MOD MDM: CPT

## 2019-07-12 RX ADMIN — IOHEXOL 100 ML: 350 INJECTION, SOLUTION INTRAVENOUS at 18:52

## 2019-07-12 NOTE — ED PROVIDER NOTES
History  Chief Complaint   Patient presents with    Abdominal Pain     onset about 1 week ago, today "I felt a ripping sensation and now something is uzing out off my belly button", this  happened before, going on for aabout 5 years now, hx of hernia     Rash     "i had it for years, i want somone look at it"     Antonio Apodaca is a 29 y o  male w PMH perianal abscess who presents for evaluation of abdominal pain  Patient w abdominal pain ongoing intermittently for 8 - 10 years along with periumbilical drainage  He reports about once a year he suffers from some spontaneous drainage from his belly button  Reports this is usually is serous drainage  Reports this happened today as he was walking  Reports he felt a ripping sensation and then noticed some fluid draining from his belly button  He has some slight pain around the belly button  Denies prior abdominal surgeries other than a fistulotomy related to perianal abscess  Prior to Admission Medications   Prescriptions Last Dose Informant Patient Reported? Taking?   ondansetron (ZOFRAN-ODT) 4 mg disintegrating tablet   No No   Sig: Take 1 tablet (4 mg total) by mouth every 8 (eight) hours as needed for nausea   Patient not taking: Reported on 5/3/2019   oxyCODONE-acetaminophen (PERCOCET) 5-325 mg per tablet   No No   Sig: Take 1 tablet by mouth every 8 (eight) hours as needed for moderate painMax Daily Amount: 3 tablets   Patient not taking: Reported on 5/3/2019      Facility-Administered Medications: None       History reviewed  No pertinent past medical history      Past Surgical History:   Procedure Laterality Date    ABCESS DRAINAGE  10/29/2018    carlos anal    ANAL FISTULOTOMY N/A 3/14/2019    Procedure: FISTULOTOMY;  Surgeon: David Blum MD;  [facility];  Service: Colorectal    LA COLONOSCOPY FLX DX W/COLLJ SPEC WHEN PFRMD N/A 11/21/2018    Procedure: COLONOSCOPY;  Surgeon: Holden Shi MD;  [facility]; Service: Gastroenterology    OK ESOPHAGOGASTRODUODENOSCOPY TRANSORAL DIAGNOSTIC N/A 11/21/2018    Procedure: ESOPHAGOGASTRODUODENOSCOPY (EGD); Surgeon: Yvon Laird MD;  [facility]; Service: Gastroenterology    OK ESOPHAGOGASTRODUODENOSCOPY TRANSORAL DIAGNOSTIC N/A 2/7/2019    Procedure: ESOPHAGOGASTRODUODENOSCOPY (EGD); Surgeon: Yvon Laird MD;  [facility]; Service: Gastroenterology    TONSILLECTOMY         Family History   Problem Relation Age of Onset    Diabetes Father      I have reviewed and agree with the history as documented  Social History     Tobacco Use    Smoking status: Current Every Day Smoker     Packs/day: 0 50    Smokeless tobacco: Never Used   Substance Use Topics    Alcohol use: No    Drug use: Yes     Frequency: 2 0 times per week     Types: Marijuana        Review of Systems   Constitutional: Negative for activity change, chills, diaphoresis, fatigue and fever  HENT: Negative for congestion and rhinorrhea  Eyes: Negative for pain  Respiratory: Negative for cough, chest tightness, shortness of breath and wheezing  Cardiovascular: Negative for chest pain and palpitations  Gastrointestinal: Negative for abdominal distention, constipation, diarrhea, nausea and vomiting  Genitourinary: Negative for difficulty urinating and dysuria  Musculoskeletal: Negative for arthralgias and myalgias  Skin: Positive for rash  Neurological: Negative for dizziness, weakness, light-headedness and headaches  Psychiatric/Behavioral: The patient is not nervous/anxious  Physical Exam  Physical Exam   Constitutional: He is oriented to person, place, and time  He appears well-developed and well-nourished  No distress  HENT:   Head: Normocephalic and atraumatic  Eyes: Pupils are equal, round, and reactive to light  Neck: Neck supple  No tracheal deviation present  Cardiovascular: Normal rate, regular rhythm and intact distal pulses   Exam reveals no gallop and no friction rub  No murmur heard  Pulmonary/Chest: Effort normal and breath sounds normal  No respiratory distress  He has no wheezes  He has no rales  Abdominal: Soft  Bowel sounds are normal  He exhibits no distension and no mass  There is tenderness  There is no guarding  Mild tenderness adjacent to the umbilicus on the left side  There is no active drainage from the belly button  No abscess or erythema  He shows me a picture as well that shows some serous drainage from the belly but earlier  Musculoskeletal: He exhibits no edema or deformity  Neurological: He is alert and oriented to person, place, and time  Skin: Skin is warm and dry  He is not diaphoretic  Psychiatric: He has a normal mood and affect  His behavior is normal    Nursing note and vitals reviewed        Vital Signs  ED Triage Vitals [07/12/19 1704]   Temperature Pulse Respirations Blood Pressure SpO2   98 3 °F (36 8 °C) 86 16 136/62 98 %      Temp Source Heart Rate Source Patient Position - Orthostatic VS BP Location FiO2 (%)   Oral Monitor Sitting Right arm --      Pain Score       6           Vitals:    07/12/19 1704 07/12/19 2020   BP: 136/62    Pulse: 86 80   Patient Position - Orthostatic VS: Sitting          Visual Acuity      ED Medications  Medications   iohexol (OMNIPAQUE) 350 MG/ML injection (MULTI-DOSE) 100 mL (100 mL Intravenous Given 7/12/19 1852)       Diagnostic Studies  Results Reviewed     Procedure Component Value Units Date/Time    Basic metabolic panel [[id number]] Collected:  07/12/19 1747    Lab Status:  Final result Specimen:  Blood from Arm, Right Updated:  07/12/19 1816     Sodium 142 mmol/L      Potassium 4 2 mmol/L      Chloride 105 mmol/L      CO2 30 mmol/L      ANION GAP 7 mmol/L      BUN 14 mg/dL      Creatinine 0 97 mg/dL      Glucose 95 mg/dL      Calcium 9 4 mg/dL      eGFR 103 ml/min/1 73sq m     Narrative:       [place] guidelines for Chronic Kidney Disease (CKD):     Stage 1 with normal or high GFR (GFR > 90 mL/min/1 73 square meters)    Stage 2 Mild CKD (GFR = 60-89 mL/min/1 73 square meters)    Stage 3A Moderate CKD (GFR = 45-59 mL/min/1 73 square meters)    Stage 3B Moderate CKD (GFR = 30-44 mL/min/1 73 square meters)    Stage 4 Severe CKD (GFR = 15-29 mL/min/1 73 square meters)    Stage 5 End Stage CKD (GFR <15 mL/min/1 73 square meters)  Note: GFR calculation is accurate only with a steady state creatinine    Hepatic function panel [[id number]]  (Normal) Collected:  07/12/19 1747    Lab Status:  Final result Specimen:  Blood from Arm, Right Updated:  07/12/19 1816     Total Bilirubin 0 70 mg/dL      Bilirubin, Direct 0 13 mg/dL      Alkaline Phosphatase 83 U/L      AST 15 U/L      ALT 23 U/L      Total Protein 7 1 g/dL      Albumin 3 9 g/dL     Lipase [[id number]]  (Abnormal) Collected:  07/12/19 1747    Lab Status:  Final result Specimen:  Blood from Arm, Right Updated:  07/12/19 1816     Lipase 71 u/L     CBC and differential [[id number]] Collected:  07/12/19 1747    Lab Status:  Final result Specimen:  Blood from Arm, Right Updated:  07/12/19 1753     WBC 8 46 Thousand/uL      RBC 4 51 Million/uL      Hemoglobin 14 5 g/dL      Hematocrit 42 6 %      MCV 95 fL      MCH 32 2 pg      MCHC 34 0 g/dL      RDW 12 8 %      MPV 9 1 fL      Platelets 284 Thousands/uL      nRBC 0 /100 WBCs      Neutrophils Relative 54 %      Immat GRANS % 0 %      Lymphocytes Relative 33 %      Monocytes Relative 8 %      Eosinophils Relative 4 %      Basophils Relative 1 %      Neutrophils Absolute 4 64 Thousands/µL      Immature Grans Absolute 0 01 Thousand/uL      Lymphocytes Absolute 2 76 Thousands/µL      Monocytes Absolute 0 69 Thousand/µL      Eosinophils Absolute 0 30 Thousand/µL      Basophils Absolute 0 06 Thousands/µL                  CT abdomen pelvis with contrast   Final Result by Dante Garcia MD (07/12 1909)      Similar left perianal fistula tract    No evidence of perianal or perirectal abscess  Other chronic findings as above                  Workstation performed: [id number]                    Procedures  Procedures       ED Course                               MDM  Number of Diagnoses or Management Options  Periumbilical pain:   Diagnosis management comments: DDX includes but not ltd to: Unclear etiology for patient's belly button drainage  This seems to be a chronic issue he has suffered for for many years  Discussed low utility in working this up in the emergency department  However because of his pain adjacent to the site we can check some abdominal labs  He is concerned and is requesting imaging and a CT  Explained we can obtain a CT while he is here in the emergency department  Plan is to obtain:  CBC as marker of infectivity, hemoconcentration for hydration status  CMP to check for electrolytes, renal function, liver function   Lipase to check for pancreatitis  CT a/p to check for acute intra-abdominal pathology to explain symptomatology     Based on results:  Patient's abdominal CT scan shows known perianal fistula  Advised to follow up with his colorectal surgeon  He otherwise can follow up with his PCP  He also mentioned that he has a rash in the torso and the back  Mention this is a secondary complaint which was also evaluated  I did offer him a Benadryl cream for this as he reports it is slightly  It   However he denied wanting any creams  He tells me he is actually also had a rash for 10 years  He has several small pink circular lesions across his chest and back  Looks somewhat consistent with tinea versicolor and offered him a ketoconazole cream which he also denied and said he preferred to just follow up with his family doctor for this as this is chronic, ongoing for 10 years and not really bothering him  Return parameters discussed  Pt requires f/u as an outpt  Pt expresses understanding w above treatment plan   All questions answered prior to d/c  Portions of the record may have been created with voice recognition software   Occasional wrong word or "sound a like" substitutions may have occurred due to the inherent limitations of voice recognition software   Read the chart carefully and recognize, using context, where substitutions have occurred  Disposition  Final diagnoses:   Periumbilical pain     Time reflects when diagnosis was documented in both MDM as applicable and the Disposition within this note     Time User Action Codes Description Comment    7/12/2019  8:14 PM Gentry Pereira Add [I87 13] Periumbilical pain       ED Disposition     ED Disposition Condition Date/Time Comment    Discharge Stable Fri Jul 12, 2019  8:12 PM Susannah Moralesshimon discharge to home/self care  Follow-up Information     Follow up With Specialties Details Why Contact Info Additional Information    [address] Emergency Department Emergency Medicine  If symptoms worsen [address] [facility] [address] ED, [address] [place], [place], [facility] Internal Medicine   [facility] [address]       Earnest New MD Colon and Rectal Surgery  for continued management of chronic perianal fistula [facility]             Discharge Medication List as of 7/12/2019  8:14 PM      CONTINUE these medications which have NOT CHANGED    Details   ondansetron (ZOFRAN-ODT) 4 mg disintegrating tablet Take 1 tablet (4 mg total) by mouth every 8 (eight) hours as needed for nausea, Starting Mon 4/29/2019, Print      oxyCODONE-acetaminophen (PERCOCET) 5-325 mg per tablet Take 1 tablet by mouth every 8 (eight) hours as needed for moderate painMax Daily Amount: 3 tablets, Starting Mon 4/29/2019, Print           No discharge procedures on file      ED Provider  Electronically Signed by           Lisa Noguera PA-C  07/12/19 9957

## 2019-07-26 ENCOUNTER — CONSULT (OUTPATIENT)
Dept: PAIN MEDICINE | Facility: CLINIC | Age: 34
End: 2019-07-26
Payer: COMMERCIAL

## 2019-07-26 VITALS
HEART RATE: 59 BPM | HEIGHT: 71 IN | WEIGHT: 185 LBS | DIASTOLIC BLOOD PRESSURE: 80 MMHG | SYSTOLIC BLOOD PRESSURE: 142 MMHG | RESPIRATION RATE: 18 BRPM | BODY MASS INDEX: 25.9 KG/M2

## 2019-07-26 DIAGNOSIS — M54.16 LUMBAR RADICULOPATHY: Primary | ICD-10-CM

## 2019-07-26 DIAGNOSIS — M51.16 LUMBAR DISC DISEASE WITH RADICULOPATHY: ICD-10-CM

## 2019-07-26 PROCEDURE — 99204 OFFICE O/P NEW MOD 45 MIN: CPT | Performed by: ANESTHESIOLOGY

## 2019-07-26 NOTE — PROGRESS NOTES
Assessment:  1  Lumbar radiculopathy  FL spine and pain procedure   2  Lumbar disc disease with radiculopathy  FL spine and pain procedure     Plan: This is a 46-year-old male who presents today for initial consultation for management of low back pain and bilateral leg pain which is neuropathic in nature  Patient has a diagnosis of lumbar radiculitis  On physical examination, there is no gross motor deficits appreciated  Sensory exam demonstrates hypoesthesis an the anterolateral aspect of the leg bilaterally  MRI of the lumbosacral spine was reviewed in detail with the patient and all his questions were answered to his satisfaction  Patient takes over-the-counter analgesics without much relief of pain  He does routine home exercises including Hever technique of the lumbar spine and core exercises and stretching   He has had physical therapy in the past without much relief of pain  The patient's pain persists despite time, relative rest, activity modification and therapy  I recommend a [bilateral L4] transforaminal epidural steroid injection to diminish any inflammatory component of the pain  We will initially use a transforaminal approach to better concentrate the steroid along the affected nerve root  The injection may need to be repeated based on the degree of pain relief following the initial injection  In the office today, we reviewed the nature of the patient's pathology in depth using diagrams and models  We discussed the approach we would use for the epidural steroid injection and provided literature for home review  The patient understands the risks associated with the procedure including bleeding, infection, tissue injury, allergic reaction and paralysis and provided written and verbal consent in the office today  History of Present Illness: The patient is a 36 y o  male who presents for consultation in regards to low back pain and bilateral leg pain    Of note, patient reports prior injury to his low back  Patient reports pain which is moderate in nature rated 5/10 on the pain scale  His pain is constant, with no typical pattern  Patient further describes pain as shooting, numbness with pins and needles sensation   Patient reports low back pain and leg pain which is aggravated with prolonged standing, bending, sitting, walking and exercise  Patient has had physical therapy in the past without much relief of pain  Patient does routine home exercise regimen daily which includes core strengthening, Hever modality of the lumbar spine without much relief of pain  Most recent MRI dated August 2018 demonstrates disc desiccation protrusion causing foraminal narrowing at L4-L5 and L5-S1  Patient denies bladder and or bowel issues  He denies fever or chills  He uses heat and TENS unit for pain relief  He reports bilateral leg weakness  Review of Systems:    Review of Systems   Constitutional: Negative for fever and unexpected weight change  HENT: Negative for trouble swallowing  Eyes: Negative for visual disturbance  Respiratory: Negative for shortness of breath and wheezing  Cardiovascular: Negative for chest pain and palpitations  Gastrointestinal: Negative for constipation, diarrhea, nausea and vomiting  Endocrine: Negative for cold intolerance, heat intolerance and polydipsia  Genitourinary: Negative for difficulty urinating and frequency  Musculoskeletal: Positive for arthralgias, back pain and gait problem  Negative for joint swelling and myalgias  Skin: Negative for rash  Neurological: Positive for numbness  Negative for dizziness, seizures, syncope, weakness and headaches  Hematological: Does not bruise/bleed easily  Psychiatric/Behavioral: Negative for dysphoric mood  All other systems reviewed and are negative  No past medical history on file      Past Surgical History:   Procedure Laterality Date    ABCESS DRAINAGE  10/29/2018    carlos anal    ANAL FISTULOTOMY N/A 3/14/2019    Procedure: FISTULOTOMY;  Surgeon: Michel Brittle, MD;  [facility];  Service: Colorectal    NM COLONOSCOPY FLX DX W/COLLJ SPEC WHEN PFRMD N/A 11/21/2018    Procedure: COLONOSCOPY;  Surgeon: Lalita Estrella MD;  [facility]; Service: Gastroenterology    NM ESOPHAGOGASTRODUODENOSCOPY TRANSORAL DIAGNOSTIC N/A 11/21/2018    Procedure: ESOPHAGOGASTRODUODENOSCOPY (EGD); Surgeon: Lalita Estrella MD;  [facility]; Service: Gastroenterology    NM ESOPHAGOGASTRODUODENOSCOPY TRANSORAL DIAGNOSTIC N/A 2/7/2019    Procedure: ESOPHAGOGASTRODUODENOSCOPY (EGD); Surgeon: Lalita Estrella MD;  [facility]; Service: Gastroenterology    TONSILLECTOMY         Family History   Problem Relation Age of Onset    Diabetes Father        Social History     Occupational History    Not on file   Tobacco Use    Smoking status: Current Every Day Smoker     Packs/day: 0 50    Smokeless tobacco: Never Used   Substance and Sexual Activity    Alcohol use: No    Drug use: Yes     Frequency: 2 0 times per week     Types: Marijuana    Sexual activity: Not on file         Current Outpatient Medications:     ondansetron (ZOFRAN-ODT) 4 mg disintegrating tablet, Take 1 tablet (4 mg total) by mouth every 8 (eight) hours as needed for nausea (Patient not taking: Reported on 5/3/2019), Disp: 15 tablet, Rfl: 0    oxyCODONE-acetaminophen (PERCOCET) 5-325 mg per tablet, Take 1 tablet by mouth every 8 (eight) hours as needed for moderate painMax Daily Amount: 3 tablets (Patient not taking: Reported on 5/3/2019), Disp: 12 tablet, Rfl: 0    No Known Allergies    Physical Exam:    There were no vitals taken for this visit  Constitutional: normal, well developed, well nourished, alert, in no distress and non-toxic and no overt pain behavior    Eyes: anicteric  HEENT: grossly intact  Neck: supple, symmetric, trachea midline and no masses   Pulmonary:even and unlabored  Cardiovascular:No edema or pitting edema present  Skin:Normal without rashes or lesions and well hydrated  Psychiatric:Mood and affect appropriate  Neurologic:Cranial Nerves II-XII grossly intact  Musculoskeletal:normal    Lumbar Spine Exam    Appearance:  Normal lordosis  Palpation/Tenderness:  left lumbar paraspinal tenderness  right lumbar paraspinal tenderness  Sensory:  Anterolateral leg b/l  Range of Motion:  Extension:  Moderately limited  with pain  Motor Strength:  Left foot dorsiflexion:  5/5  Left foot plantar flexion:  5/5  Right foot dorsiflexion:  5/5  Right foot plantar flexion:  5/5  Reflexes:  Left Patellar:  2+   Right Patellar:  2+     Imaging  No orders to display       No orders of the defined types were placed in this encounter

## 2019-08-15 ENCOUNTER — HOSPITAL ENCOUNTER (OUTPATIENT)
Dept: RADIOLOGY | Facility: CLINIC | Age: 34
Discharge: HOME | End: 2019-08-15
Attending: ANESTHESIOLOGY | Admitting: ANESTHESIOLOGY
Payer: COMMERCIAL

## 2019-08-15 VITALS
HEART RATE: 67 BPM | OXYGEN SATURATION: 97 % | TEMPERATURE: 97.2 F | RESPIRATION RATE: 20 BRPM | DIASTOLIC BLOOD PRESSURE: 74 MMHG | SYSTOLIC BLOOD PRESSURE: 115 MMHG

## 2019-08-15 DIAGNOSIS — M51.16 LUMBAR DISC DISEASE WITH RADICULOPATHY: ICD-10-CM

## 2019-08-15 DIAGNOSIS — M54.16 LUMBAR RADICULOPATHY: ICD-10-CM

## 2019-08-15 PROCEDURE — 64483 NJX AA&/STRD TFRM EPI L/S 1: CPT | Performed by: ANESTHESIOLOGY

## 2019-08-15 RX ORDER — BUPIVACAINE HCL/PF 2.5 MG/ML
5 VIAL (ML) INJECTION ONCE
Status: COMPLETED | OUTPATIENT
Start: 2019-08-15 | End: 2019-08-15

## 2019-08-15 RX ORDER — PAPAVERINE HCL 150 MG
20 CAPSULE, EXTENDED RELEASE ORAL ONCE
Status: COMPLETED | OUTPATIENT
Start: 2019-08-15 | End: 2019-08-15

## 2019-08-15 RX ORDER — LIDOCAINE HYDROCHLORIDE 10 MG/ML
5 INJECTION, SOLUTION EPIDURAL; INFILTRATION; INTRACAUDAL; PERINEURAL ONCE
Status: COMPLETED | OUTPATIENT
Start: 2019-08-15 | End: 2019-08-15

## 2019-08-15 RX ADMIN — IOHEXOL 2 ML: 300 INJECTION, SOLUTION INTRAVENOUS at 09:44

## 2019-08-15 RX ADMIN — Medication 2 ML: at 09:43

## 2019-08-15 RX ADMIN — DEXAMETHASONE SODIUM PHOSPHATE 20 MG: 10 INJECTION, SOLUTION INTRAMUSCULAR; INTRAVENOUS at 09:44

## 2019-08-15 RX ADMIN — LIDOCAINE HYDROCHLORIDE 3 ML: 10 INJECTION, SOLUTION EPIDURAL; INFILTRATION; INTRACAUDAL; PERINEURAL at 09:43

## 2019-08-15 NOTE — DISCHARGE INSTR - LAB
Epidural Steroid Injection   WHAT YOU NEED TO KNOW:   An epidural steroid injection (GM) is a procedure to inject steroid medicine into the epidural space  The epidural space is between your spinal cord and vertebrae  Steroids reduce inflammation and fluid buildup in your spine that may be causing pain  You may be given pain medicine along with the steroids  ACTIVITY  · Do not drive or operate machinery today  · No strenuous activity today - bending, lifting, etc   · You may resume normal activites starting tomorrow - start slowly and as tolerated  · You may shower today, but no tub baths or hot tubs  · You may have numbness for several hours from the local anesthetic  Please use caution and common sense, especially with weight-bearing activities  CARE OF THE INJECTION SITE  · If you have soreness or pain, apply ice to the area today (20 minutes on/20 minutes off)  · Starting tomorrow, you may use warm, moist heat or ice if needed  · You may have an increase or change in your discomfort for 36-48 hours after your treatment  · Apply ice and continue with any pain medication you have been prescribed  · Notify [facility] if you have any of the following: redness, drainage, swelling, headache, stiff neck or fever above 100°F     SPECIAL INSTRUCTIONS  · Our office will contact you in approximately 7 days for a progress report  MEDICATIONS  · Continue to take all routine medications  · Our office may have instructed you to hold some medications  If you have a problem specifically related to your procedure, please call our office at [phone]  Problems not related to your procedure should be directed to your primary care physician

## 2019-08-22 ENCOUNTER — TELEPHONE (OUTPATIENT)
Dept: PAIN MEDICINE | Facility: CLINIC | Age: 34
End: 2019-08-22

## 2019-08-22 NOTE — TELEPHONE ENCOUNTER
Patient states she or he has 0% of improvement & 6-8 pain level  Patient would like to speak to a nurse about their status   Please advise, cesar    Call back# [phone]   (Patient request to be contacted tomorrow 8/23/19 at 2 pm)

## 2019-08-28 NOTE — TELEPHONE ENCOUNTER
Please send can not reach letter  Pt has no f/u scheduled  If pt calls back, please transfer to [facility] triage

## 2019-08-29 ENCOUNTER — HOSPITAL ENCOUNTER (EMERGENCY)
Facility: HOSPITAL | Age: 34
Discharge: HOME | End: 2019-08-29
Attending: EMERGENCY MEDICINE
Payer: COMMERCIAL

## 2019-08-29 ENCOUNTER — APPOINTMENT (EMERGENCY)
Dept: MRI IMAGING | Facility: HOSPITAL | Age: 34
End: 2019-08-29
Payer: COMMERCIAL

## 2019-08-29 VITALS
BODY MASS INDEX: 27.03 KG/M2 | RESPIRATION RATE: 18 BRPM | WEIGHT: 193.78 LBS | SYSTOLIC BLOOD PRESSURE: 94 MMHG | HEART RATE: 74 BPM | DIASTOLIC BLOOD PRESSURE: 55 MMHG | OXYGEN SATURATION: 97 % | TEMPERATURE: 98.3 F

## 2019-08-29 DIAGNOSIS — M54.16 LUMBAR RADICULOPATHY: Primary | ICD-10-CM

## 2019-08-29 DIAGNOSIS — M54.50 ACUTE LOW BACK PAIN: ICD-10-CM

## 2019-08-29 LAB
ANION GAP SERPL CALCULATED.3IONS-SCNC: 10 MMOL/L (ref 4–13)
APTT PPP: 18 SECONDS (ref 23–37)
BASOPHILS # BLD AUTO: 0.03 THOUSANDS/ΜL (ref 0–0.1)
BASOPHILS NFR BLD AUTO: 0 % (ref 0–1)
BUN SERPL-MCNC: 10 MG/DL (ref 5–25)
CALCIUM SERPL-MCNC: 9.7 MG/DL (ref 8.3–10.1)
CHLORIDE SERPL-SCNC: 103 MMOL/L (ref 100–108)
CO2 SERPL-SCNC: 28 MMOL/L (ref 21–32)
CREAT SERPL-MCNC: 0.88 MG/DL (ref 0.6–1.3)
CRP SERPL QL: <3 MG/L
EOSINOPHIL # BLD AUTO: 0.2 THOUSAND/ΜL (ref 0–0.61)
EOSINOPHIL NFR BLD AUTO: 2 % (ref 0–6)
ERYTHROCYTE [DISTWIDTH] IN BLOOD BY AUTOMATED COUNT: 13 % (ref 11.6–15.1)
ERYTHROCYTE [SEDIMENTATION RATE] IN BLOOD: 1 MM/HOUR (ref 0–10)
GFR SERPL CREATININE-BSD FRML MDRD: 113 ML/MIN/1.73SQ M
GLUCOSE SERPL-MCNC: 87 MG/DL (ref 65–140)
HCT VFR BLD AUTO: 44.2 % (ref 36.5–49.3)
HGB BLD-MCNC: 15 G/DL (ref 12–17)
IMM GRANULOCYTES # BLD AUTO: 0.03 THOUSAND/UL (ref 0–0.2)
IMM GRANULOCYTES NFR BLD AUTO: 0 % (ref 0–2)
INR PPP: 1.05 (ref 0.84–1.19)
LYMPHOCYTES # BLD AUTO: 3.21 THOUSANDS/ΜL (ref 0.6–4.47)
LYMPHOCYTES NFR BLD AUTO: 33 % (ref 14–44)
MCH RBC QN AUTO: 32.6 PG (ref 26.8–34.3)
MCHC RBC AUTO-ENTMCNC: 33.9 G/DL (ref 31.4–37.4)
MCV RBC AUTO: 96 FL (ref 82–98)
MONOCYTES # BLD AUTO: 0.58 THOUSAND/ΜL (ref 0.17–1.22)
MONOCYTES NFR BLD AUTO: 6 % (ref 4–12)
NEUTROPHILS # BLD AUTO: 5.55 THOUSANDS/ΜL (ref 1.85–7.62)
NEUTS SEG NFR BLD AUTO: 59 % (ref 43–75)
NRBC BLD AUTO-RTO: 0 /100 WBCS
PLATELET # BLD AUTO: 140 THOUSANDS/UL (ref 149–390)
PMV BLD AUTO: 9.2 FL (ref 8.9–12.7)
POTASSIUM SERPL-SCNC: 4 MMOL/L (ref 3.5–5.3)
PROTHROMBIN TIME: 13.8 SECONDS (ref 11.6–14.5)
RBC # BLD AUTO: 4.6 MILLION/UL (ref 3.88–5.62)
SODIUM SERPL-SCNC: 141 MMOL/L (ref 136–145)
WBC # BLD AUTO: 9.6 THOUSAND/UL (ref 4.31–10.16)

## 2019-08-29 PROCEDURE — 85610 PROTHROMBIN TIME: CPT | Performed by: EMERGENCY MEDICINE

## 2019-08-29 PROCEDURE — 85730 THROMBOPLASTIN TIME PARTIAL: CPT | Performed by: EMERGENCY MEDICINE

## 2019-08-29 PROCEDURE — 96374 THER/PROPH/DIAG INJ IV PUSH: CPT

## 2019-08-29 PROCEDURE — 99284 EMERGENCY DEPT VISIT MOD MDM: CPT

## 2019-08-29 PROCEDURE — 36415 COLL VENOUS BLD VENIPUNCTURE: CPT | Performed by: EMERGENCY MEDICINE

## 2019-08-29 PROCEDURE — A9585 GADOBUTROL INJECTION: HCPCS | Performed by: EMERGENCY MEDICINE

## 2019-08-29 PROCEDURE — 99284 EMERGENCY DEPT VISIT MOD MDM: CPT | Performed by: EMERGENCY MEDICINE

## 2019-08-29 PROCEDURE — 86140 C-REACTIVE PROTEIN: CPT | Performed by: EMERGENCY MEDICINE

## 2019-08-29 PROCEDURE — 85025 COMPLETE CBC W/AUTO DIFF WBC: CPT | Performed by: EMERGENCY MEDICINE

## 2019-08-29 PROCEDURE — 85652 RBC SED RATE AUTOMATED: CPT | Performed by: EMERGENCY MEDICINE

## 2019-08-29 PROCEDURE — 72158 MRI LUMBAR SPINE W/O & W/DYE: CPT

## 2019-08-29 PROCEDURE — 93005 ELECTROCARDIOGRAM TRACING: CPT

## 2019-08-29 PROCEDURE — 80048 BASIC METABOLIC PNL TOTAL CA: CPT | Performed by: EMERGENCY MEDICINE

## 2019-08-29 RX ORDER — DEXAMETHASONE SODIUM PHOSPHATE 4 MG/ML
6 INJECTION, SOLUTION INTRA-ARTICULAR; INTRALESIONAL; INTRAMUSCULAR; INTRAVENOUS; SOFT TISSUE ONCE
Status: COMPLETED | OUTPATIENT
Start: 2019-08-29 | End: 2019-08-29

## 2019-08-29 RX ORDER — OXYCODONE HYDROCHLORIDE AND ACETAMINOPHEN 5; 325 MG/1; MG/1
1 TABLET ORAL EVERY 4 HOURS PRN
Qty: 12 TABLET | Refills: 0 | Status: SHIPPED | OUTPATIENT
Start: 2019-08-29 | End: 2019-11-22 | Stop reason: HOSPADM

## 2019-08-29 RX ORDER — ACETAMINOPHEN 325 MG/1
650 TABLET ORAL ONCE
Status: COMPLETED | OUTPATIENT
Start: 2019-08-29 | End: 2019-08-29

## 2019-08-29 RX ORDER — METHYLPREDNISOLONE 4 MG/1
TABLET ORAL
Qty: 1 EACH | Refills: 0 | Status: SHIPPED | OUTPATIENT
Start: 2019-08-29 | End: 2019-11-22 | Stop reason: HOSPADM

## 2019-08-29 RX ORDER — OXYCODONE HYDROCHLORIDE AND ACETAMINOPHEN 5; 325 MG/1; MG/1
1 TABLET ORAL ONCE
Status: COMPLETED | OUTPATIENT
Start: 2019-08-29 | End: 2019-08-29

## 2019-08-29 RX ADMIN — OXYCODONE HYDROCHLORIDE AND ACETAMINOPHEN 1 TABLET: 5; 325 TABLET ORAL at 22:17

## 2019-08-29 RX ADMIN — DEXAMETHASONE SODIUM PHOSPHATE 6 MG: 4 INJECTION, SOLUTION INTRAMUSCULAR; INTRAVENOUS at 19:42

## 2019-08-29 RX ADMIN — GADOBUTROL 8 ML: 604.72 INJECTION INTRAVENOUS at 21:34

## 2019-08-29 RX ADMIN — ACETAMINOPHEN 650 MG: 325 TABLET, FILM COATED ORAL at 20:35

## 2019-08-29 NOTE — TELEPHONE ENCOUNTER
I spoke with patient and advised that he should report to the ER since he does have leg weakness  Patient states that he is on his way

## 2019-08-29 NOTE — TELEPHONE ENCOUNTER
Pt's wife called again regarding pt's pain  States it is severe to bl lower back  Advised tylenol and ibuprofen prn, as well as ice or heat  Wife states he doesn't like taking meds and ice/heat do not work  Suggested topical like OTC lidocaine patch or aspercreme  States he has tried patches without relief  Advised ED if pain is severe  They are requesting a cb directly from [facility]   Advised will forward request

## 2019-08-29 NOTE — ED PROVIDER NOTES
History  Chief Complaint   Patient presents with    Back Pain     pt states he has had ongoing back pain for yaers  had a procedure done at the spine center 2 weeks ago and had 2 epidurals  woke up with excrutiating pain this morning and was sent here by doctor to possibly get an MRI     Pt with chronic back pain who presents to the ED c/o gradual onset progressive worsening low back pain  He reports having an "epidural steroid injection" 2 weeks ago which provided 3 days of relief of pain  Since that time his pain has returned and it is worse in the last 2-3 days, worsened with standing up and bending and with ambulation and associated with both legs "feeling like they are going to give out" episodically  He also reports that his legs are diffusely paresthetic x the last few days  He denies fever and chills  He reports no relief from OTC meds  His pain mgmt doctor called in a rx for methylprednisolone but this was just called in a few minutes ago and he has not yet filled it  He denies any recent fall or trauma  He denies UE weakness or numbness  He has no other sxs or concerns at this time  Additional history per review of recent encounters and phone conversations in [facility] [address]  Prior to Admission Medications   Prescriptions Last Dose Informant Patient Reported? Taking? methylPREDNISolone 4 MG tablet therapy pack   No No   Sig: Use as directed on package      Facility-Administered Medications: None       History reviewed  No pertinent past medical history  Past Surgical History:   Procedure Laterality Date    ABCESS DRAINAGE  10/29/2018    carlos anal    ANAL FISTULOTOMY N/A 3/14/2019    Procedure: FISTULOTOMY;  Surgeon: Angelita Nissen, MD;  Location: [facility];  Service: Colorectal    FL COLONOSCOPY FLX DX W/COLLJ SPEC WHEN PFRMD N/A 11/21/2018    Procedure: COLONOSCOPY;  Surgeon: Nimo Krishnan MD;  Location: [facility];   Service: Gastroenterology    FL ESOPHAGOGASTRODUODENOSCOPY TRANSORAL DIAGNOSTIC N/A 11/21/2018    Procedure: ESOPHAGOGASTRODUODENOSCOPY (EGD); Surgeon: Paris Cee MD;  Location: [facility]; Service: Gastroenterology    DE ESOPHAGOGASTRODUODENOSCOPY TRANSORAL DIAGNOSTIC N/A 2/7/2019    Procedure: ESOPHAGOGASTRODUODENOSCOPY (EGD); Surgeon: Paris Cee MD;  [facility]: [facility]; Service: Gastroenterology    TONSILLECTOMY         Family History   Problem Relation Age of Onset    Diabetes Father      I have reviewed and agree with the history as documented  Social History     Tobacco Use    Smoking status: Current Every Day Smoker     Packs/day: 0 50    Smokeless tobacco: Never Used   Substance Use Topics    Alcohol use: No    Drug use: Yes     Frequency: 2 0 times per week     Types: Marijuana        Review of Systems   Constitutional: Negative for chills and fever  Musculoskeletal: Positive for back pain  Negative for arthralgias, gait problem, joint swelling, myalgias, neck pain and neck stiffness  Skin: Negative for color change, pallor, rash and wound  Neurological: Positive for weakness and numbness  Negative for dizziness, tremors, seizures, syncope, facial asymmetry, speech difficulty, light-headedness and headaches  All other systems reviewed and are negative  Physical Exam  Physical Exam   Constitutional: He is oriented to person, place, and time  He appears well-developed and well-nourished  No distress  HENT:   Head: Normocephalic and atraumatic  Eyes: Pupils are equal, round, and reactive to light  Conjunctivae and EOM are normal    Neck: Normal range of motion  Neck supple  No JVD present  Cardiovascular: Normal rate, regular rhythm, normal heart sounds and intact distal pulses  Exam reveals no gallop and no friction rub  No murmur heard  Pulmonary/Chest: Effort normal and breath sounds normal  No stridor  No respiratory distress  He has no wheezes  He has no rales  Abdominal: Soft  He exhibits no distension   There is no tenderness  Musculoskeletal: Normal range of motion  He exhibits no edema, tenderness or deformity  Neurological: He is alert and oriented to person, place, and time  He displays normal reflexes  No cranial nerve deficit or sensory deficit  He exhibits normal muscle tone  Coordination normal    Normal gait  Ambulates without difficulty or assistance  Skin: Skin is warm and dry  Capillary refill takes less than 2 seconds  He is not diaphoretic  Nursing note and vitals reviewed        Vital Signs  ED Triage Vitals   Temperature Pulse Respirations Blood Pressure SpO2   08/29/19 2210 08/29/19 1906 08/29/19 1906 08/29/19 1906 08/29/19 1906   98 3 °F (36 8 °C) 86 18 124/64 97 %      Temp Source Heart Rate Source Patient Position - Orthostatic VS BP Location FiO2 (%)   08/29/19 2210 08/29/19 1906 08/29/19 1906 08/29/19 1906 --   Oral Monitor Lying Right arm       Pain Score       08/29/19 2035       8           Vitals:    08/29/19 1906 08/29/19 2210 08/29/19 2300   BP: 124/64 107/60 94/55   Pulse: 86 70 74   Patient Position - Orthostatic VS: Lying           Visual Acuity  Visual Acuity      Most Recent Value   L Pupil Size (mm)  4   R Pupil Size (mm)  4          ED Medications  Medications   dexamethasone (DECADRON) injection 6 mg (6 mg Intravenous Given 8/29/19 1942)   acetaminophen (TYLENOL) tablet 650 mg (650 mg Oral Given 8/29/19 2035)   gadobutrol injection (MULTI-DOSE) SOLN 8 mL (8 mL Intravenous Given 8/29/19 2134)   oxyCODONE-acetaminophen (PERCOCET) 5-325 mg per tablet 1 tablet (1 tablet Oral Given 8/29/19 2217)       Diagnostic Studies  Results Reviewed     Procedure Component Value Units Date/Time    Sedimentation rate, automated [[id number]]  (Normal) Collected:  08/29/19 1934    Lab Status:  Final result Specimen:  Blood from Arm, Right Updated:  08/29/19 2101     Sed Rate 1 mm/hour     Basic metabolic panel [[id number]] Collected:  08/29/19 1934    Lab Status:  Final result Specimen:  Blood from Arm, Right Updated:  08/29/19 2007     Sodium 141 mmol/L      Potassium 4 0 mmol/L      Chloride 103 mmol/L      CO2 28 mmol/L      ANION GAP 10 mmol/L      BUN 10 mg/dL      Creatinine 0 88 mg/dL      Glucose 87 mg/dL      Calcium 9 7 mg/dL      eGFR 113 ml/min/1 73sq m     Narrative:       [place] guidelines for Chronic Kidney Disease (CKD):     Stage 1 with normal or high GFR (GFR > 90 mL/min/1 73 square meters)    Stage 2 Mild CKD (GFR = 60-89 mL/min/1 73 square meters)    Stage 3A Moderate CKD (GFR = 45-59 mL/min/1 73 square meters)    Stage 3B Moderate CKD (GFR = 30-44 mL/min/1 73 square meters)    Stage 4 Severe CKD (GFR = 15-29 mL/min/1 73 square meters)    Stage 5 End Stage CKD (GFR <15 mL/min/1 73 square meters)  Note: GFR calculation is accurate only with a steady state creatinine    C-reactive protein [[id number]]  (Normal) Collected:  08/29/19 1934    Lab Status:  Final result Specimen:  Blood from Arm, Right Updated:  08/29/19 2007     CRP <3 0 mg/L     Protime-INR [[id number]]  (Normal) Collected:  08/29/19 1934    Lab Status:  Final result Specimen:  Blood from Arm, Right Updated:  08/29/19 1957     Protime 13 8 seconds      INR 1 05    APTT [[id number]]  (Abnormal) Collected:  08/29/19 1934    Lab Status:  Final result Specimen:  Blood from Arm, Right Updated:  08/29/19 1957     PTT 18 seconds     CBC and differential [[id number]]  (Abnormal) Collected:  08/29/19 1934    Lab Status:  Final result Specimen:  Blood from Arm, Right Updated:  08/29/19 1953     WBC 9 60 Thousand/uL      RBC 4 60 Million/uL      Hemoglobin 15 0 g/dL      Hematocrit 44 2 %      MCV 96 fL      MCH 32 6 pg      MCHC 33 9 g/dL      RDW 13 0 %      MPV 9 2 fL      Platelets 871 Thousands/uL      nRBC 0 /100 WBCs      Neutrophils Relative 59 %      Immat GRANS % 0 %      Lymphocytes Relative 33 %      Monocytes Relative 6 %      Eosinophils Relative 2 %      Basophils Relative 0 %      Neutrophils Absolute 5 55 Thousands/µL      Immature Grans Absolute 0 03 Thousand/uL      Lymphocytes Absolute 3 21 Thousands/µL      Monocytes Absolute 0 58 Thousand/µL      Eosinophils Absolute 0 20 Thousand/µL      Basophils Absolute 0 03 Thousands/µL                  MRI lumbar spine w wo contrast   ED Interpretation by Nathan Damon DO (08/29 2256)   Left-sided L4-L5 disc extrusion with inferior disc migration impinging the left lateral recess (anterolateral left spinal canal) compressing/impinging the left L5 nerve root  This is significantly worse compared to the prior exam August 22, 2018  Final Result by Elio Timmons MD (08/29 2253)      Left-sided L4-L5 disc extrusion with inferior disc migration impinging the left lateral recess (anterolateral left spinal canal) compressing/impinging the left L5 nerve root  This is significantly worse compared to the prior exam August 22, 2018  Workstation performed: [id number]                    Procedures  ECG 12 Lead Documentation Only  Date/Time: 8/29/2019 7:53 PM  Performed by: Roque Holly MD  Authorized by: Roque Holly MD     Indications / Diagnosis:  Reports being electrocuted earlier this week  ECG reviewed by me, the ED Provider: yes    Patient location:  ED  Previous ECG:     Previous ECG:  Unavailable  Interpretation:     Interpretation: normal    Rate:     ECG rate:  72    ECG rate assessment: normal    Rhythm:     Rhythm: sinus rhythm    Ectopy:     Ectopy: none    Comments:      Normal ekg              ED Course                               MDM  Number of Diagnoses or Management Options  Acute low back pain:   Lumbar radiculopathy:       Disposition  Final diagnoses:   Lumbar radiculopathy   Acute low back pain     Time reflects when diagnosis was documented in both MDM as applicable and the Disposition within this note     Time User Action Codes Description Comment    8/29/2019  8:53 PM Kenji Harrison [M54 16] Lumbar radiculopathy 8/29/2019  8:53 PM Sanjay Maguire Modify [G38 94] Lumbar radiculopathy     8/29/2019  8:53 PM Lisandro Mixon Add [M54 5] Acute low back pain       ED Disposition     ED Disposition Condition Date/Time Comment    Discharge Stable Thu Aug 29, 2019 11:00 PM Ryne Urena discharge to home/self care  Follow-up Information     Follow up With Specialties Details Why Contact Info Additional Information    [address] [facility] Emergency Department Emergency Medicine  If symptoms worsen: numbness, especially in the rectal area, loss of control of bladder or bowel function, etc [address] [facility] [address] ED, [address] [place] [address], [facility]    your spine doctor (bring MRI results) and pain management  Discharge Medication List as of 8/29/2019 11:01 PM      START taking these medications    Details   oxyCODONE-acetaminophen (PERCOCET) 5-325 mg per tablet Take 1 tablet by mouth every 4 (four) hours as needed for moderate pain for up to 12 dosesMax Daily Amount: 6 tablets, Starting Thu 8/29/2019, Print         CONTINUE these medications which have NOT CHANGED    Details   methylPREDNISolone 4 MG tablet therapy pack Use as directed on package, Normal           No discharge procedures on file      ED Provider  Electronically Signed by           Casandra Saavedra MD  09/02/19 9955

## 2019-08-29 NOTE — TELEPHONE ENCOUNTER
Emailed on call Dr:  Long Carrillo is a patient of Dr Miguel Ángel Martin,   he had Lumbar radiculopathy completed on 8/15/2019 felt better for a few days and now his pain is excruciating and will be heading to the ED  Pt would like a callback to see if there is something that can be done instead of him going to the ED  He does not believe anything can be done for him at the ED         Pt can be reached  [phone]

## 2019-08-29 NOTE — TELEPHONE ENCOUNTER
See below  Pt is 2 weeks s/p Bl L4 TFESI  Pt reports pain is worse than prior to procedure  No f/u scheduled  Any recommendations at this time?

## 2019-08-29 NOTE — TELEPHONE ENCOUNTER
Pt's wife called stating pt is in increased pain, pain worst than before his procedure and would like the dr to call        Pt can be reached at : [phone]

## 2019-08-30 DIAGNOSIS — M51.26 LUMBAR DISC HERNIATION: Primary | ICD-10-CM

## 2019-08-30 LAB
ATRIAL RATE: 72 BPM
P AXIS: 57 DEGREES
PR INTERVAL: 150 MS
QRS AXIS: 66 DEGREES
QRSD INTERVAL: 94 MS
QT INTERVAL: 364 MS
QTC INTERVAL: 398 MS
T WAVE AXIS: 20 DEGREES
VENTRICULAR RATE: 72 BPM

## 2019-08-30 PROCEDURE — 93010 ELECTROCARDIOGRAM REPORT: CPT | Performed by: INTERNAL MEDICINE

## 2019-08-30 NOTE — TELEPHONE ENCOUNTER
Lm for pt to cb  May put through to [facility] triage when pt calls back    SHAHID SI  Pt did go to ED  Was given short term percocet and had a lumbar MRI  Results in Epic to review  Pt has no f/u scheduled

## 2019-08-30 NOTE — TELEPHONE ENCOUNTER
MRI reviewed  I placed a referral for him to see Dr Ceci Cedeño  Patient has worsened disc herniation

## 2019-09-09 ENCOUNTER — CONSULT (OUTPATIENT)
Dept: NEUROSURGERY | Facility: CLINIC | Age: 34
End: 2019-09-09
Attending: ANESTHESIOLOGY
Payer: COMMERCIAL

## 2019-09-09 VITALS
RESPIRATION RATE: 16 BRPM | HEIGHT: 71 IN | BODY MASS INDEX: 27.19 KG/M2 | TEMPERATURE: 97.7 F | DIASTOLIC BLOOD PRESSURE: 67 MMHG | HEART RATE: 73 BPM | WEIGHT: 194.2 LBS | SYSTOLIC BLOOD PRESSURE: 100 MMHG

## 2019-09-09 DIAGNOSIS — M54.10 RADICULOPATHY, UNSPECIFIED SPINAL REGION: ICD-10-CM

## 2019-09-09 DIAGNOSIS — M48.061 SPINAL STENOSIS OF LUMBAR REGION, UNSPECIFIED WHETHER NEUROGENIC CLAUDICATION PRESENT: Primary | ICD-10-CM

## 2019-09-09 DIAGNOSIS — M51.26 LUMBAR DISC HERNIATION: ICD-10-CM

## 2019-09-09 PROCEDURE — 99204 OFFICE O/P NEW MOD 45 MIN: CPT | Performed by: NEUROLOGICAL SURGERY

## 2019-09-09 RX ORDER — GABAPENTIN 300 MG/1
300 CAPSULE ORAL 3 TIMES DAILY
Qty: 90 CAPSULE | Refills: 2 | Status: SHIPPED | OUTPATIENT
Start: 2019-09-09 | End: 2021-01-05
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
## 2019-09-24 ENCOUNTER — EVALUATION (OUTPATIENT)
Dept: PHYSICAL THERAPY | Facility: CLINIC | Age: 34
End: 2019-09-24
Payer: COMMERCIAL

## 2019-09-24 DIAGNOSIS — M48.061 SPINAL STENOSIS OF LUMBAR REGION, UNSPECIFIED WHETHER NEUROGENIC CLAUDICATION PRESENT: ICD-10-CM

## 2019-09-24 DIAGNOSIS — M51.26 LUMBAR DISC HERNIATION: Primary | ICD-10-CM

## 2019-09-24 DIAGNOSIS — M54.10 RADICULOPATHY, UNSPECIFIED SPINAL REGION: ICD-10-CM

## 2019-09-24 PROCEDURE — 97110 THERAPEUTIC EXERCISES: CPT | Performed by: PHYSICAL THERAPIST

## 2019-09-24 PROCEDURE — 97162 PT EVAL MOD COMPLEX 30 MIN: CPT | Performed by: PHYSICAL THERAPIST

## 2019-09-24 NOTE — PROGRESS NOTES
PT Evaluation     Today's date: 2019  Patient name: Miguel Angel Hinds  : 1985  MRN: [id number]  Referring provider: Felicia Jenkins MD  Dx:   Encounter Diagnosis     ICD-10-CM    1  Lumbar disc herniation M51 26 Ambulatory referral to Physical Therapy   2  Spinal stenosis of lumbar region, unspecified whether neurogenic claudication present M48 061 Ambulatory referral to Physical Therapy   3  Radiculopathy, unspecified spinal region M54 10 Ambulatory referral to Physical Therapy       Start Time: 910  Stop Time: 950  Total time in clinic (min): 40 minutes    Assessment  Assessment details: Pt is a 35 y/o male presenting to physical therapy with chief complaint of LBP that travels into the LLE that has been happening for a couple years, but has gotten worse in the past couple years  He presents with weakness (4-) in L3-S1 myotomes and decreased sensation in the L4/5 dermatomes  Pt's reflexes were WNL bilaterally  His gait is altered with decreased trunk rotation and ER of the LLE causing toeing out  Pt had peripheralization with PPU and decreased pain with L side glides  Pt able to contract TA strong and with good control  Pt would benefit from physical therapy to improve AROM, pain, stabilization, gait quality, and overall function  Impairments: abnormal gait, abnormal or restricted ROM, activity intolerance, impaired balance, impaired physical strength, lacks appropriate home exercise program, pain with function and poor posture   Functional limitations: standing, walking, stairs  Symptom irritability: highUnderstanding of Dx/Px/POC: good   Prognosis: good    Goals  STG: 3 weeks  1  Pt will demonstrate independence with HEP  2  Pt will improve lumbar AROM by 10%  3  Pt will improve BLE strength by at least 1/2 grade  4  Pt will report pain no more than 5/10  5  Pt will generate proper TA contraction without cuing to show improved NM control    LT weeks  1   Pt will improve lumbar AROM to at least 80% in all directions  2  Pt will report pain no more than 2/10  3  Pt will be able to stand/sit for at least 30 minutes without pain to return to PLOF  4  Pt will be able to lift at least 10lbs from floor to waist without pain  Plan  Patient would benefit from: skilled physical therapy  Planned modality interventions: cryotherapy and thermotherapy: hydrocollator packs  Planned therapy interventions: therapeutic exercise, therapeutic activities, stretching, strengthening, patient education, neuromuscular re-education, massage, manual therapy, balance, gait training and home exercise program  Frequency: 2x week  Duration in weeks: 6  Treatment plan discussed with: patient        Subjective Evaluation    History of Present Illness  Mechanism of injury: Pt reports he has had LBP for years, that came on progressively  Pt reports sitting, walking, standing are the biggest problems for him throughout the day  He reports sitting with his weight shifted to his R makes it a little better  Pt reports he works in [employer] and reports the commuting and walking throughout the day are difficult  Pt reports pain and N/T in both LE, L>R  Pt reports weakness in the legs, and they feel like they're going to give out on him   Pt reports he is just here because the insurance won't pay for surgery unless he comes to PT first           Recurrent probem    Quality of life: fair    Pain  Current pain ratin  At best pain ratin  At worst pain rating: 10  Quality: sharp and needle-like  Relieving factors: heat and change in position  Aggravating factors: running, lifting, stair climbing, walking, standing and sitting  Progression: worsening    Social Support  Lives in: [facility]  Lives with: spouse and young children      Diagnostic Tests  MRI studies: abnormal  Treatments  Previous treatment: injection treatment, medication and chiropractic  Patient Goals  Patient goals for therapy: increased strength, independence with ADLs/IADLs, return to sport/leisure activities, increased motion and decreased pain          Objective     Neurological Testing     Sensation     Lumbar   Left   Diminished: light touch    Right   Intact: light touch    Reflexes   Left   Patellar (L4): normal (2+)  Achilles (S1): normal (2+)    Right   Patellar (L4): normal (2+)  Achilles (S1): normal (2+)    Active Range of Motion     Lumbar   Flexion:  with pain Restriction level: moderate  Extension:  with pain Restriction level: moderate  Left lateral flexion:  with pain Restriction level: minimal  Right lateral flexion:  with pain Restriction level: minimal  Mechanical Assessment    Cervical      Thoracic      Lumbar    Lying extension: repeated movements  Pain location: centralized  Left Sidegliding: repeated movements  Pain intensity: better  Pain level: decreased    Strength/Myotome Testing     Lumbar     Right   Normal strength    Left Hip   Planes of Motion   Flexion: 4+    Left Knee   Flexion: 4-  Extension: 4-    Left Ankle/Foot   Dorsiflexion: 4-  Great toe extension: 4-             Precautions: Patient tolerance      Manual  9/24                                                                                 Exercise Diary  9/24            HAI             L sidelzaria             TA contraction             PPT             SKFO             Seated lumbar ext                                                                                                                                                                                                       Modalities

## 2019-09-26 ENCOUNTER — OFFICE VISIT (OUTPATIENT)
Dept: PHYSICAL THERAPY | Facility: CLINIC | Age: 34
End: 2019-09-26
Payer: COMMERCIAL

## 2019-09-26 DIAGNOSIS — M51.26 LUMBAR DISC HERNIATION: Primary | ICD-10-CM

## 2019-09-26 DIAGNOSIS — M48.061 SPINAL STENOSIS OF LUMBAR REGION, UNSPECIFIED WHETHER NEUROGENIC CLAUDICATION PRESENT: ICD-10-CM

## 2019-09-26 DIAGNOSIS — M54.10 RADICULOPATHY, UNSPECIFIED SPINAL REGION: ICD-10-CM

## 2019-09-26 PROCEDURE — 97140 MANUAL THERAPY 1/> REGIONS: CPT | Performed by: PHYSICAL THERAPIST

## 2019-09-26 PROCEDURE — 97112 NEUROMUSCULAR REEDUCATION: CPT | Performed by: PHYSICAL THERAPIST

## 2019-09-26 PROCEDURE — 97110 THERAPEUTIC EXERCISES: CPT | Performed by: PHYSICAL THERAPIST

## 2019-09-26 NOTE — PROGRESS NOTES
Daily Note     Today's date: 2019  Patient name: Molly Jimenez  : 1985  MRN: [id number]  Referring provider: Johny Damon MD  Dx:   Encounter Diagnosis     ICD-10-CM    1  Lumbar disc herniation M51 26    2  Spinal stenosis of lumbar region, unspecified whether neurogenic claudication present M48 061    3  Radiculopathy, unspecified spinal region M54 10                   Subjective: No change from HEP or from eval, continues to have pain down the lower extremity  Objective: See treatment diary below      Assessment: No change with lateral glides, HAI, prone hips off center, or manual shift correction  Pain travels do the foot without change from program today  Plan: Continue per plan of care        Precautions: Patient tolerance      Manual             Shift correct  8 mins                                                                   Exercise Diary             HAI  5'           L sidelgides  3x10           TA contraction             PPT             SKFO             Seated lumbar ext             PPU  10x           Prone with hips off center  5 mins                                                                                                                                                                           Modalities

## 2019-09-30 ENCOUNTER — OFFICE VISIT (OUTPATIENT)
Dept: PHYSICAL THERAPY | Facility: CLINIC | Age: 34
End: 2019-09-30
Payer: COMMERCIAL

## 2019-09-30 DIAGNOSIS — M54.10 RADICULOPATHY, UNSPECIFIED SPINAL REGION: ICD-10-CM

## 2019-09-30 DIAGNOSIS — M51.26 LUMBAR DISC HERNIATION: Primary | ICD-10-CM

## 2019-09-30 DIAGNOSIS — M48.061 SPINAL STENOSIS OF LUMBAR REGION, UNSPECIFIED WHETHER NEUROGENIC CLAUDICATION PRESENT: ICD-10-CM

## 2019-09-30 PROCEDURE — 97110 THERAPEUTIC EXERCISES: CPT | Performed by: PHYSICAL THERAPIST

## 2019-09-30 PROCEDURE — 97140 MANUAL THERAPY 1/> REGIONS: CPT | Performed by: PHYSICAL THERAPIST

## 2019-09-30 PROCEDURE — 97112 NEUROMUSCULAR REEDUCATION: CPT | Performed by: PHYSICAL THERAPIST

## 2019-09-30 NOTE — PROGRESS NOTES
Daily Note     Today's date: 2019  Patient name: Kendell Smiley  : 1985  MRN: [id number]  Referring provider: Kaycee Bonner MD  Dx:   Encounter Diagnosis     ICD-10-CM    1  Lumbar disc herniation M51 26    2  Spinal stenosis of lumbar region, unspecified whether neurogenic claudication present M48 061    3  Radiculopathy, unspecified spinal region M54 10        Start Time: 1756  Stop Time: 1835  Total time in clinic (min): 39 minutes    Subjective: Pt reports pain in both hips and down into the L foot  He reports the pain is the same  Objective: See treatment diary below      Assessment: Pt able to tolerate more exercises than previous visit  Increased R sided LBP following P-A lumbar mobs, however improved following IASTM  Pt self-manipulated at end of session, which helped to improve pain  Plan: Progress as tolerated        Precautions: Patient tolerance      Manual            Shift correct  8 mins           Lumbar PA while HAI   7'          Lumbar IASTM   4'                                        Exercise Diary            HAI  5' 5'          L sidelgides  3x10 20x          TA contraction             PPT   3"x20          SKFO   20x ea          Seated lumbar ext   Stand 20x          PPU  10x           Prone with hips off center  5 mins           Piriformis stretch   30"x3          LTR   10"x10          Paloff press   RTB 20x                                                                                                                                   Modalities

## 2019-10-07 ENCOUNTER — OFFICE VISIT (OUTPATIENT)
Dept: PHYSICAL THERAPY | Facility: CLINIC | Age: 34
End: 2019-10-07
Payer: COMMERCIAL

## 2019-10-07 DIAGNOSIS — M48.061 SPINAL STENOSIS OF LUMBAR REGION, UNSPECIFIED WHETHER NEUROGENIC CLAUDICATION PRESENT: ICD-10-CM

## 2019-10-07 DIAGNOSIS — M51.26 LUMBAR DISC HERNIATION: Primary | ICD-10-CM

## 2019-10-07 DIAGNOSIS — M54.10 RADICULOPATHY, UNSPECIFIED SPINAL REGION: ICD-10-CM

## 2019-10-07 PROCEDURE — 97112 NEUROMUSCULAR REEDUCATION: CPT

## 2019-10-07 PROCEDURE — 97110 THERAPEUTIC EXERCISES: CPT

## 2019-10-07 NOTE — PROGRESS NOTES
Daily Note     Today's date: 10/7/2019  Patient name: Zenia Matos  : 1985  MRN: [id number]  Referring provider: David Ennis MD  Dx:   Encounter Diagnosis     ICD-10-CM    1  Lumbar disc herniation M51 26    2  Spinal stenosis of lumbar region, unspecified whether neurogenic claudication present M48 061    3  Radiculopathy, unspecified spinal region M54 10                   Subjective: Pt states he is experiencing pain down L posterior LE into L lateral foot  Pt states this has not changed since starting PT  Objective: See treatment diary below      Assessment: Pt presents with positive neural tension in LLE  No change in symptoms following nerve slides or prone press ups this visit  Pt prefers hooklying position for comfort  Reported increased LBP post session  Plan: Continue per plan of care        Precautions: Patient tolerance      Manual  9/24 9/26 9/30 10/7         Shift correct  8 mins           Lumbar PA while HAI   7' deferred         Lumbar IASTM   4' deferred         Supine sciatic sliders    5'                          Exercise Diary  9/24 9/26 9/30 10/7         HAI  5' 5'          L sidelgides  3x10 20x          TA contraction    5"x20         PPT   3"x20          SKFO   20x ea          Seated lumbar ext   Stand 20x          PPU  10x  2x10         Prone with hips off center  5 mins           Piriformis stretch   30"x3 30"x3         LTR   10"x10 10"x10         Paloff press   RTB 20x rtb x20         Slump sliders    x10         DLS pball press    7"F61         DLS pull down    rtb x20                                                                                           Modalities

## 2019-10-10 ENCOUNTER — APPOINTMENT (OUTPATIENT)
Dept: PHYSICAL THERAPY | Facility: CLINIC | Age: 34
End: 2019-10-10
Payer: COMMERCIAL

## 2019-10-14 ENCOUNTER — APPOINTMENT (OUTPATIENT)
Dept: PHYSICAL THERAPY | Facility: CLINIC | Age: 34
End: 2019-10-14
Payer: COMMERCIAL

## 2019-10-17 ENCOUNTER — APPOINTMENT (OUTPATIENT)
Dept: PHYSICAL THERAPY | Facility: CLINIC | Age: 34
End: 2019-10-17
Payer: COMMERCIAL

## 2019-10-21 ENCOUNTER — APPOINTMENT (OUTPATIENT)
Dept: PHYSICAL THERAPY | Facility: CLINIC | Age: 34
End: 2019-10-21
Payer: COMMERCIAL

## 2019-10-24 ENCOUNTER — APPOINTMENT (OUTPATIENT)
Dept: PHYSICAL THERAPY | Facility: CLINIC | Age: 34
End: 2019-10-24
Payer: COMMERCIAL

## 2019-10-28 ENCOUNTER — TRANSCRIBE ORDERS (OUTPATIENT)
Dept: LAB | Facility: CLINIC | Age: 34
End: 2019-10-28

## 2019-10-28 ENCOUNTER — APPOINTMENT (OUTPATIENT)
Dept: PHYSICAL THERAPY | Facility: CLINIC | Age: 34
End: 2019-10-28
Payer: COMMERCIAL

## 2019-10-28 ENCOUNTER — OFFICE VISIT (OUTPATIENT)
Dept: NEUROSURGERY | Facility: CLINIC | Age: 34
End: 2019-10-28
Payer: COMMERCIAL

## 2019-10-28 ENCOUNTER — APPOINTMENT (OUTPATIENT)
Dept: LAB | Facility: CLINIC | Age: 34
End: 2019-10-28
Payer: COMMERCIAL

## 2019-10-28 VITALS
RESPIRATION RATE: 16 BRPM | SYSTOLIC BLOOD PRESSURE: 132 MMHG | DIASTOLIC BLOOD PRESSURE: 66 MMHG | HEIGHT: 71 IN | BODY MASS INDEX: 27.58 KG/M2 | TEMPERATURE: 96.3 F | WEIGHT: 197 LBS | HEART RATE: 70 BPM

## 2019-10-28 DIAGNOSIS — M48.061 SPINAL STENOSIS OF LUMBAR REGION, UNSPECIFIED WHETHER NEUROGENIC CLAUDICATION PRESENT: ICD-10-CM

## 2019-10-28 DIAGNOSIS — M54.16 LUMBAR RADICULOPATHY: ICD-10-CM

## 2019-10-28 DIAGNOSIS — M54.16 LUMBAR RADICULOPATHY: Primary | ICD-10-CM

## 2019-10-28 DIAGNOSIS — Z01.818 PRE-PROCEDURAL EXAMINATION: ICD-10-CM

## 2019-10-28 LAB
ALBUMIN SERPL BCP-MCNC: 3.8 G/DL (ref 3.5–5)
ALP SERPL-CCNC: 90 U/L (ref 46–116)
ALT SERPL W P-5'-P-CCNC: 31 U/L (ref 12–78)
ANION GAP SERPL CALCULATED.3IONS-SCNC: 9 MMOL/L (ref 4–13)
APTT PPP: 30 SECONDS (ref 23–37)
AST SERPL W P-5'-P-CCNC: 17 U/L (ref 5–45)
BASOPHILS # BLD AUTO: 0.05 THOUSANDS/ΜL (ref 0–0.1)
BASOPHILS NFR BLD AUTO: 1 % (ref 0–1)
BILIRUB SERPL-MCNC: 0.3 MG/DL (ref 0.2–1)
BUN SERPL-MCNC: 13 MG/DL (ref 5–25)
CALCIUM SERPL-MCNC: 9 MG/DL (ref 8.3–10.1)
CHLORIDE SERPL-SCNC: 106 MMOL/L (ref 100–108)
CO2 SERPL-SCNC: 28 MMOL/L (ref 21–32)
CREAT SERPL-MCNC: 0.76 MG/DL (ref 0.6–1.3)
EOSINOPHIL # BLD AUTO: 0.49 THOUSAND/ΜL (ref 0–0.61)
EOSINOPHIL NFR BLD AUTO: 5 % (ref 0–6)
ERYTHROCYTE [DISTWIDTH] IN BLOOD BY AUTOMATED COUNT: 13.1 % (ref 11.6–15.1)
EST. AVERAGE GLUCOSE BLD GHB EST-MCNC: 108 MG/DL
GFR SERPL CREATININE-BSD FRML MDRD: 120 ML/MIN/1.73SQ M
GLUCOSE P FAST SERPL-MCNC: 98 MG/DL (ref 65–99)
HBA1C MFR BLD: 5.4 % (ref 4.2–6.3)
HCT VFR BLD AUTO: 44.3 % (ref 36.5–49.3)
HGB BLD-MCNC: 14.7 G/DL (ref 12–17)
IMM GRANULOCYTES # BLD AUTO: 0.03 THOUSAND/UL (ref 0–0.2)
IMM GRANULOCYTES NFR BLD AUTO: 0 % (ref 0–2)
INR PPP: 0.98 (ref 0.84–1.19)
LYMPHOCYTES # BLD AUTO: 2.55 THOUSANDS/ΜL (ref 0.6–4.47)
LYMPHOCYTES NFR BLD AUTO: 24 % (ref 14–44)
MCH RBC QN AUTO: 32.9 PG (ref 26.8–34.3)
MCHC RBC AUTO-ENTMCNC: 33.2 G/DL (ref 31.4–37.4)
MCV RBC AUTO: 99 FL (ref 82–98)
MONOCYTES # BLD AUTO: 0.71 THOUSAND/ΜL (ref 0.17–1.22)
MONOCYTES NFR BLD AUTO: 7 % (ref 4–12)
NEUTROPHILS # BLD AUTO: 6.7 THOUSANDS/ΜL (ref 1.85–7.62)
NEUTS SEG NFR BLD AUTO: 63 % (ref 43–75)
NRBC BLD AUTO-RTO: 0 /100 WBCS
PLATELET # BLD AUTO: 293 THOUSANDS/UL (ref 149–390)
PMV BLD AUTO: 8.9 FL (ref 8.9–12.7)
POTASSIUM SERPL-SCNC: 4.3 MMOL/L (ref 3.5–5.3)
PROT SERPL-MCNC: 7 G/DL (ref 6.4–8.2)
PROTHROMBIN TIME: 12.4 SECONDS (ref 11.6–14.5)
RBC # BLD AUTO: 4.47 MILLION/UL (ref 3.88–5.62)
SODIUM SERPL-SCNC: 143 MMOL/L (ref 136–145)
WBC # BLD AUTO: 10.53 THOUSAND/UL (ref 4.31–10.16)

## 2019-10-28 PROCEDURE — 36415 COLL VENOUS BLD VENIPUNCTURE: CPT

## 2019-10-28 PROCEDURE — 85025 COMPLETE CBC W/AUTO DIFF WBC: CPT

## 2019-10-28 PROCEDURE — 99214 OFFICE O/P EST MOD 30 MIN: CPT | Performed by: NEUROLOGICAL SURGERY

## 2019-10-28 PROCEDURE — 85610 PROTHROMBIN TIME: CPT

## 2019-10-28 PROCEDURE — 85730 THROMBOPLASTIN TIME PARTIAL: CPT

## 2019-10-28 PROCEDURE — 80053 COMPREHEN METABOLIC PANEL: CPT

## 2019-10-28 PROCEDURE — 83036 HEMOGLOBIN GLYCOSYLATED A1C: CPT

## 2019-10-28 RX ORDER — CHLORHEXIDINE GLUCONATE 0.12 MG/ML
15 RINSE ORAL ONCE
Status: CANCELLED | OUTPATIENT
Start: 2019-10-28 | End: 2019-10-28

## 2019-10-28 RX ORDER — CEFAZOLIN SODIUM 2 G/50ML
2000 SOLUTION INTRAVENOUS ONCE
Status: CANCELLED | OUTPATIENT
Start: 2019-11-22 | End: 2019-10-28
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
## 2019-10-29 NOTE — PROGRESS NOTES
PT Discharge    Today's date: 10/29/2019  Patient name: Luisa Peter  : 1985  MRN: [id number]  Referring provider: Karl Chavira MD  Dx:   Encounter Diagnosis     ICD-10-CM    1  Lumbar disc herniation M51 26    2  Spinal stenosis of lumbar region, unspecified whether neurogenic claudication present M48 061    3  Radiculopathy, unspecified spinal region M54 10      Assessment  Assessment details: Pt DC from therapy, as he did not make gains and since has elected to have surgery  Subjective and objective information and goals unable to be updated at this time  Pt DC from skilled therapy  Impairments: abnormal gait, abnormal or restricted ROM, activity intolerance, impaired balance, impaired physical strength, lacks appropriate home exercise program, pain with function and poor posture   Functional limitations: standing, walking, stairs  Symptom irritability: highUnderstanding of Dx/Px/POC: good   Prognosis: good    Goals  STG: 3 weeks - not met  1  Pt will demonstrate independence with HEP  2  Pt will improve lumbar AROM by 10%  3  Pt will improve BLE strength by at least 1/2 grade  4  Pt will report pain no more than 5/10  5  Pt will generate proper TA contraction without cuing to show improved NM control    LT weeks - not met  1  Pt will improve lumbar AROM to at least 80% in all directions  2  Pt will report pain no more than 2/10  3  Pt will be able to stand/sit for at least 30 minutes without pain to return to PLOF  4  Pt will be able to lift at least 10lbs from floor to waist without pain        Plan  Patient would benefit from: skilled physical therapy  Planned modality interventions: cryotherapy and thermotherapy: hydrocollator packs  Planned therapy interventions: therapeutic exercise, therapeutic activities, stretching, strengthening, patient education, neuromuscular re-education, massage, manual therapy, balance, gait training and home exercise program        Subjective Evaluation    History of Present Illness  Mechanism of injury: Pt reports he has had LBP for years, that came on progressively  Pt reports sitting, walking, standing are the biggest problems for him throughout the day  He reports sitting with his weight shifted to his R makes it a little better  Pt reports he works in [employer] and reports the commuting and walking throughout the day are difficult  Pt reports pain and N/T in both LE, L>R  Pt reports weakness in the legs, and they feel like they're going to give out on him   Pt reports he is just here because the insurance won't pay for surgery unless he comes to PT first           Recurrent probem    Quality of life: fair    Pain  Current pain ratin  At best pain ratin  At worst pain rating: 10  Quality: sharp and needle-like  Relieving factors: heat and change in position  Aggravating factors: running, lifting, stair climbing, walking, standing and sitting  Progression: worsening    Social Support  Lives in: [facility]  Lives with: spouse and young children      Diagnostic Tests  MRI studies: abnormal  Treatments  Previous treatment: injection treatment, medication and chiropractic  Patient Goals  Patient goals for therapy: increased strength, independence with ADLs/IADLs, return to sport/leisure activities, increased motion and decreased pain          Objective     Neurological Testing     Sensation     Lumbar   Left   Diminished: light touch    Right   Intact: light touch    Reflexes   Left   Patellar (L4): normal (2+)  Achilles (S1): normal (2+)    Right   Patellar (L4): normal (2+)  Achilles (S1): normal (2+)    Active Range of Motion     Lumbar   Flexion:  with pain Restriction level: moderate  Extension:  with pain Restriction level: moderate  Left lateral flexion:  with pain Restriction level: minimal  Right lateral flexion:  with pain Restriction level: minimal  Mechanical Assessment    Cervical      Thoracic      Lumbar    Lying extension: repeated movements  Pain location: centralized  Left Sidegliding: repeated movements  Pain intensity: better  Pain level: decreased    Strength/Myotome Testing     Lumbar     Right   Normal strength    Left Hip   Planes of Motion   Flexion: 4+    Left Knee   Flexion: 4-  Extension: 4-    Left Ankle/Foot   Dorsiflexion: 4-  Great toe extension: 4-

## 2019-10-31 ENCOUNTER — APPOINTMENT (OUTPATIENT)
Dept: PHYSICAL THERAPY | Facility: CLINIC | Age: 34
End: 2019-10-31
Payer: COMMERCIAL

## 2019-11-06 ENCOUNTER — OFFICE VISIT (OUTPATIENT)
Dept: INTERNAL MEDICINE CLINIC | Facility: CLINIC | Age: 34
End: 2019-11-06
Payer: COMMERCIAL

## 2019-11-06 VITALS
DIASTOLIC BLOOD PRESSURE: 74 MMHG | BODY MASS INDEX: 26.94 KG/M2 | HEART RATE: 76 BPM | RESPIRATION RATE: 16 BRPM | HEIGHT: 71 IN | TEMPERATURE: 97.8 F | WEIGHT: 192.4 LBS | SYSTOLIC BLOOD PRESSURE: 102 MMHG

## 2019-11-06 DIAGNOSIS — M48.061 SPINAL STENOSIS OF LUMBAR REGION, UNSPECIFIED WHETHER NEUROGENIC CLAUDICATION PRESENT: Primary | ICD-10-CM

## 2019-11-06 DIAGNOSIS — M54.16 LUMBAR RADICULOPATHY: ICD-10-CM

## 2019-11-06 PROBLEM — R51.9 HEADACHE: Status: ACTIVE | Noted: 2019-11-06

## 2019-11-06 PROBLEM — F32.A DEPRESSION: Status: ACTIVE | Noted: 2019-11-06

## 2019-11-06 PROBLEM — G47.30 SLEEP APNEA, UNSPECIFIED: Status: ACTIVE | Noted: 2017-02-24

## 2019-11-06 PROCEDURE — 99242 OFF/OP CONSLTJ NEW/EST SF 20: CPT | Performed by: NURSE PRACTITIONER

## 2019-11-06 RX ORDER — AMOXICILLIN 500 MG/1
CAPSULE ORAL
Refills: 0 | COMMUNITY
Start: 2019-11-02 | End: 2021-01-05

## 2019-11-06 NOTE — PATIENT INSTRUCTIONS
Attempt to stop smoking as soon as possible prior to surgery  Cigarette Smoking and Your Health   AMBULATORY CARE:   Risks to your health if you smoke:  Nicotine and other chemicals found in tobacco damage every cell in your body  Even if you are a light smoker, you have an increased risk for cancer, heart disease, and lung disease  If you are pregnant or have diabetes, smoking increases your risk for complications  Benefits to your health if you stop smoking:   · You decrease respiratory symptoms such as coughing, wheezing, and shortness of breath  · You reduce your risk for cancers of the lung, mouth, throat, kidney, bladder, pancreas, stomach, and cervix  If you already have cancer, you increase the benefits of chemotherapy  You also reduce your risk for cancer returning or a second cancer from developing  · You reduce your risk for heart disease, blood clots, heart attack, and stroke  · You reduce your risk for lung infections, and diseases such as pneumonia, asthma, chronic bronchitis, and emphysema  · Your circulation improves  More oxygen can be delivered to your body  If you have diabetes, you lower your risk for complications, such as kidney, artery, and eye diseases  You also lower your risk for nerve damage  Nerve damage can lead to amputations, poor vision, and blindness  · You improve your body's ability to heal and to fight infections  Benefits to the health of others if you stop smoking:  Tobacco is harmful to nonsmokers who breathe in your secondhand smoke  The following are ways the health of others around you may improve when you stop smoking:  · You lower the risks for lung cancer and heart disease in nonsmoking adults  · If you are pregnant, you lower the risk for miscarriage, early delivery, low birth weight, and stillbirth  You also lower your baby's risk for SIDS, obesity, developmental delay, and neurobehavioral problems, such as ADHD       · If you have children, you lower their risk for ear infections, colds, pneumonia, bronchitis, and asthma  For more information and support to stop smoking:   · Smokefree  gov  Phone: 0- 261 - 752-2840  Web Address: www [employer]  Follow up with your healthcare provider as directed:  Write down your questions so you remember to ask them during your visits  © 2017 [employer] Information is for End User's use only and may not be sold, redistributed or otherwise used for commercial purposes  All illustrations and images included in CareNotes® are the copyrighted property of [employer] , [employer]  or [employer]  The above information is an  only  It is not intended as medical advice for individual conditions or treatments  Talk to your doctor, nurse or pharmacist before following any medical regimen to see if it is safe and effective for you

## 2019-11-06 NOTE — PROGRESS NOTES
INTERNAL MEDICINE PRE-OPERATIVE EVALUATION  [facility]    NAME: Alejandra Zamudio  AGE: 36 y o  SEX: male  : 1985     DATE: 2019     Internal Medicine Pre-Operative Evaluation:     Chief Complaint: Pre-operative Evaluation     Surgery:  Laminectomy L 4/5 microdiscectomy  Anticipated Date of Surgery: 2019  Referring Provider: Tanvi Uriarte DO        History of Present Illness:     Alejandra Zamudio is a 36 y o  male who presents to the office today for a preoperative consultation at the request of surgeon,  Ruth Miller, who plans on performing laminectomy and microdiscetomy  on 2019  Planned anesthesia is general  Patient has a bleeding risk of: no recent abnormal bleeding  Patient does not have objections to receiving blood products if needed  Assessment of Chronic Conditions:   - none     Assessment of Cardiac Risk:  · Denies unstable or severe angina or MI in the last 6 weeks or history of stent placement in the last year   · Denies decompensated heart failure (e g  New onset heart failure, NYHA functional class IV heart failure, or worsening existing heart failure)  · Denies significant arrhythmias such as high grade AV block, symptomatic ventricular arrhythmia, newly recognized ventricular tachycardia, supraventricular tachycardia with resting heart rate >100, or symptomatic bradycardia  · Denies severe heart valve disease including aortic stenosis or symptomatic mitral stenosis     Exercise Capacity:  · Able to walk 4 blocks without symptoms?: Yes  · Able to walk 2 flights without symptoms?: Yes    Prior Anesthesia Reactions: No     Personal history of venous thromboembolic disease? No    History of steroid use for >2 weeks within last year? No    STOP-BANG Sleep Apnea Screening Questionnaire:      Do you SNORE loudly (louder than talking or loud enough to be heard through closed doors)?  Yes   Do you often feel TIRED, fatigued, or sleepy during daytime? Yes = 1 point   Has anyone OBSERVED you stop breathing during your sleep? Yes = 1 point   Do you have or are you being treated for high blood pressure? No = 0 point   BMI more than 35 kg/m2? No = 0 point   AGE over 48years old? No = 0 point   NECK circumference > 16 inches (40 cm)? No = 0 point   Male GENDER? Yes = 1 point   TOTAL SCORE 3 = INTERMEDIATE risk of KEIKO       Review of Systems:     Review of Systems   Constitutional: Negative  HENT: Negative  Eyes: Negative  Respiratory: Negative  Cardiovascular: Negative  Gastrointestinal: Negative  Genitourinary: Negative  Musculoskeletal: Positive for back pain  Skin: Negative  Neurological: Negative  Psychiatric/Behavioral: Negative           Problem List:     Patient Active Problem List   Diagnosis    Internal hemorrhoids    Umbilical bleeding    Nausea    Rectal pain    Pharyngeal dysphagia    History of rectal abscess    Periumbilical pain, chronic    Decreased appetite    Other fatigue    Migraine    Toshia-rectal abscess    Nausea and vomiting    Dysphagia    Rectal abscess    Perirectal abscess    Anal fistula    Intractable vomiting with nausea    Anal abscess    Lumbar radiculopathy    Spinal stenosis of lumbar region    Depression    Headache    Sleep apnea, unspecified        Allergies:     No Known Allergies     Current Medications:       Current Outpatient Medications:     amoxicillin (AMOXIL) 500 mg capsule, take 1 capsule every 6 hours, Disp: , Rfl: 0    gabapentin (NEURONTIN) 300 mg capsule, Take 1 capsule (300 mg total) by mouth 3 (three) times a day (Patient not taking: Reported on 11/6/2019), Disp: 90 capsule, Rfl: 2    methylPREDNISolone 4 MG tablet therapy pack, Use as directed on package (Patient not taking: Reported on 10/28/2019), Disp: 1 each, Rfl: 0    oxyCODONE-acetaminophen (PERCOCET) 5-325 mg per tablet, Take 1 tablet by mouth every 4 (four) hours as needed for moderate pain for up to 12 dosesMax Daily Amount: 6 tablets (Patient not taking: Reported on 10/28/2019), Disp: 12 tablet, Rfl: 0     Past History:     History reviewed  No pertinent past medical history  Past Surgical History:   Procedure Laterality Date    ABCESS DRAINAGE  10/29/2018    carlos anal    ANAL FISTULOTOMY N/A 3/14/2019    Procedure: FISTULOTOMY;  Surgeon: Ely Candelario MD;  [facility];  Service: Colorectal    SC COLONOSCOPY FLX DX W/COLLJ SPEC WHEN PFRMD N/A 11/21/2018    Procedure: COLONOSCOPY;  Surgeon: Ho Kaur MD;  Location: [facility]; Service: Gastroenterology    SC ESOPHAGOGASTRODUODENOSCOPY TRANSORAL DIAGNOSTIC N/A 11/21/2018    Procedure: ESOPHAGOGASTRODUODENOSCOPY (EGD); Surgeon: Ho Kaur MD;  [facility]; Service: Gastroenterology    SC ESOPHAGOGASTRODUODENOSCOPY TRANSORAL DIAGNOSTIC N/A 2/7/2019    Procedure: ESOPHAGOGASTRODUODENOSCOPY (EGD); Surgeon: Ho Kaur MD;  [facility];   Service: Gastroenterology    TONSILLECTOMY          Family History   Problem Relation Age of Onset    Diabetes Father         Social History     Socioeconomic History    Marital status: /Civil Union     Spouse name: Not on file    Number of children: Not on file    Years of education: Not on file    Highest education level: Not on file   Occupational History    Not on file   Social Needs    Financial resource strain: Not on file    Food insecurity:     Worry: Not on file     Inability: Not on file    Transportation needs:     Medical: Not on file     Non-medical: Not on file   Tobacco Use    Smoking status: Current Every Day Smoker     Packs/day: 0 50    Smokeless tobacco: Never Used   Substance and Sexual Activity    Alcohol use: No    Drug use: Not Currently     Frequency: 2 0 times per week     Types: Marijuana    Sexual activity: Yes     Partners: Female   Lifestyle    Physical activity:     Days per week: 0 days Minutes per session: 0 min    Stress: Only a little   Relationships    Social connections:     Talks on phone: Not on file     Gets together: Not on file     Attends Congregational service: Not on file     Active member of club or organization: Not on file     Attends meetings of clubs or organizations: Not on file     Relationship status: Not on file    Intimate partner violence:     Fear of current or ex partner: Not on file     Emotionally abused: Not on file     Physically abused: Not on file     Forced sexual activity: Not on file   Other Topics Concern    Not on file   Social History Narrative    Not on file        Physical Exam:      /74 (BP Location: Left arm, Patient Position: Sitting, Cuff Size: Standard)   Pulse 76   Temp 97 8 °F (36 6 °C) (Oral)   Resp 16   Ht 5' 11" (1 803 m)   Wt 87 3 kg (192 lb 6 4 oz)   BMI 26 83 kg/m²     Physical Exam   Constitutional: He is oriented to person, place, and time  He appears well-developed and well-nourished  No distress  HENT:   Head: Normocephalic and atraumatic  Mouth/Throat: No oropharyngeal exudate  Eyes: Pupils are equal, round, and reactive to light  Conjunctivae and EOM are normal  Right eye exhibits no discharge  Left eye exhibits no discharge  Neck: Normal range of motion  Neck supple  No JVD present  No tracheal deviation present  No thyromegaly present  Cardiovascular: Normal rate and regular rhythm  No murmur heard  Pulmonary/Chest: Effort normal and breath sounds normal  No respiratory distress  He has no wheezes  Abdominal: Soft  Bowel sounds are normal  There is no tenderness  Musculoskeletal: Normal range of motion  He exhibits no edema  Lymphadenopathy:     He has no cervical adenopathy  Neurological: He is alert and oriented to person, place, and time  Skin: Skin is warm and dry  Capillary refill takes less than 2 seconds  Psychiatric: He has a normal mood and affect   His behavior is normal  Judgment and thought content normal    Vitals reviewed  Data:     Pre-operative work-up    Laboratory Results: I have personally reviewed the pertinent laboratory results/reports     EKG: I have personally reviewed pertinent reports  Assessment:     1  Spinal stenosis of lumbar region, unspecified whether neurogenic claudication present     2  Lumbar radiculopathy          Plan:     36 y o  male with planned surgery: laminectomy      Cardiac Risk Estimation: per the Revised Cardiac Risk Index (Circ  [id number], 1999), the patient's risk factors for cardiac complications include none, putting him in: RCI RISK CLASS I (0 risk factors, risk of major cardiac compl  appr  0 5%)  1  Further preoperative workup as follows:   - None; no further preoperative work-up is required    2  Medication Management/Recommendations:   - None, continue medication regimen including morning of surgery, with sip of water    3  Prophylaxis for cardiac events with perioperative beta-blockers: not indicated  4  Patient requires further consultation with: None    Clearance  Patient is CLEARED for surgery without any additional cardiac testing       Ivan Ayala [facility] [address] [address]  Phone#  [phone]  Fax#  [phone]

## 2019-11-08 ENCOUNTER — TELEPHONE (OUTPATIENT)
Dept: NEUROSURGERY | Facility: CLINIC | Age: 34
End: 2019-11-08

## 2019-11-08 NOTE — TELEPHONE ENCOUNTER
Pt called at 81 yesterday with specific questions about his upcoming surgery on 11/22  Suggested that an appt to review these questions would be most appropriate, but pt refused stating its to far to come again with his surgery in only 2 weeks  Is it possible to call and speak to him re these questions?   Thank You

## 2019-11-15 NOTE — PRE-PROCEDURE INSTRUCTIONS
No outpatient medications have been marked as taking for the 11/22/19 encounter [facility] Encounter)  Pre-procedure instructions given without any further questions or concerns at this time

## 2019-11-21 ENCOUNTER — DOCUMENTATION (OUTPATIENT)
Dept: NEUROSURGERY | Facility: CLINIC | Age: 34
End: 2019-11-21

## 2019-11-22 ENCOUNTER — HOSPITAL ENCOUNTER (OUTPATIENT)
Facility: HOSPITAL | Age: 34
Setting detail: OUTPATIENT SURGERY
Discharge: HOME | End: 2019-11-22
Attending: NEUROLOGICAL SURGERY | Admitting: NEUROLOGICAL SURGERY
Payer: COMMERCIAL

## 2019-11-22 ENCOUNTER — ANESTHESIA EVENT (OUTPATIENT)
Dept: PERIOP | Facility: HOSPITAL | Age: 34
End: 2019-11-22
Payer: COMMERCIAL

## 2019-11-22 ENCOUNTER — ANESTHESIA (OUTPATIENT)
Dept: PERIOP | Facility: HOSPITAL | Age: 34
End: 2019-11-22
Payer: COMMERCIAL

## 2019-11-22 ENCOUNTER — APPOINTMENT (OUTPATIENT)
Dept: RADIOLOGY | Facility: HOSPITAL | Age: 34
End: 2019-11-22
Payer: COMMERCIAL

## 2019-11-22 VITALS
OXYGEN SATURATION: 96 % | HEART RATE: 58 BPM | RESPIRATION RATE: 10 BRPM | WEIGHT: 198.2 LBS | HEIGHT: 71 IN | BODY MASS INDEX: 27.75 KG/M2 | DIASTOLIC BLOOD PRESSURE: 53 MMHG | TEMPERATURE: 98.3 F | SYSTOLIC BLOOD PRESSURE: 103 MMHG

## 2019-11-22 DIAGNOSIS — M54.16 LUMBAR RADICULOPATHY: Primary | ICD-10-CM

## 2019-11-22 DIAGNOSIS — M48.061 SPINAL STENOSIS OF LUMBAR REGION, UNSPECIFIED WHETHER NEUROGENIC CLAUDICATION PRESENT: ICD-10-CM

## 2019-11-22 PROCEDURE — 99024 POSTOP FOLLOW-UP VISIT: CPT | Performed by: NEUROLOGICAL SURGERY

## 2019-11-22 PROCEDURE — 63030 LAMOT DCMPRN NRV RT 1 LMBR: CPT | Performed by: NEUROLOGICAL SURGERY

## 2019-11-22 PROCEDURE — 72100 X-RAY EXAM L-S SPINE 2/3 VWS: CPT

## 2019-11-22 RX ORDER — GINSENG 100 MG
CAPSULE ORAL AS NEEDED
Status: DISCONTINUED | OUTPATIENT
Start: 2019-11-22 | End: 2019-11-22 | Stop reason: HOSPADM

## 2019-11-22 RX ORDER — SODIUM CHLORIDE, SODIUM LACTATE, POTASSIUM CHLORIDE, CALCIUM CHLORIDE 600; 310; 30; 20 MG/100ML; MG/100ML; MG/100ML; MG/100ML
75 INJECTION, SOLUTION INTRAVENOUS CONTINUOUS
Status: DISCONTINUED | OUTPATIENT
Start: 2019-11-22 | End: 2019-11-22 | Stop reason: HOSPADM

## 2019-11-22 RX ORDER — NEOSTIGMINE METHYLSULFATE 1 MG/ML
INJECTION INTRAVENOUS AS NEEDED
Status: DISCONTINUED | OUTPATIENT
Start: 2019-11-22 | End: 2019-11-22 | Stop reason: SURG

## 2019-11-22 RX ORDER — KETOROLAC TROMETHAMINE 30 MG/ML
INJECTION, SOLUTION INTRAMUSCULAR; INTRAVENOUS AS NEEDED
Status: DISCONTINUED | OUTPATIENT
Start: 2019-11-22 | End: 2019-11-22 | Stop reason: SURG

## 2019-11-22 RX ORDER — CHLORHEXIDINE GLUCONATE 0.12 MG/ML
15 RINSE ORAL ONCE
Status: COMPLETED | OUTPATIENT
Start: 2019-11-22 | End: 2019-11-22

## 2019-11-22 RX ORDER — MIDAZOLAM HYDROCHLORIDE 2 MG/2ML
INJECTION, SOLUTION INTRAMUSCULAR; INTRAVENOUS AS NEEDED
Status: DISCONTINUED | OUTPATIENT
Start: 2019-11-22 | End: 2019-11-22 | Stop reason: SURG

## 2019-11-22 RX ORDER — DEXAMETHASONE SODIUM PHOSPHATE 4 MG/ML
6 INJECTION, SOLUTION INTRA-ARTICULAR; INTRALESIONAL; INTRAMUSCULAR; INTRAVENOUS; SOFT TISSUE ONCE
Status: COMPLETED | OUTPATIENT
Start: 2019-11-22 | End: 2019-11-22

## 2019-11-22 RX ORDER — ROCURONIUM BROMIDE 10 MG/ML
INJECTION, SOLUTION INTRAVENOUS AS NEEDED
Status: DISCONTINUED | OUTPATIENT
Start: 2019-11-22 | End: 2019-11-22 | Stop reason: SURG

## 2019-11-22 RX ORDER — CEFAZOLIN SODIUM 2 G/50ML
2000 SOLUTION INTRAVENOUS ONCE
Status: COMPLETED | OUTPATIENT
Start: 2019-11-22 | End: 2019-11-22

## 2019-11-22 RX ORDER — OXYCODONE HYDROCHLORIDE AND ACETAMINOPHEN 5; 325 MG/1; MG/1
1 TABLET ORAL EVERY 8 HOURS PRN
Qty: 42 TABLET | Refills: 0 | Status: SHIPPED | OUTPATIENT
Start: 2019-11-22 | End: 2019-11-22 | Stop reason: SDUPTHER

## 2019-11-22 RX ORDER — PROPOFOL 10 MG/ML
INJECTION, EMULSION INTRAVENOUS AS NEEDED
Status: DISCONTINUED | OUTPATIENT
Start: 2019-11-22 | End: 2019-11-22 | Stop reason: SURG

## 2019-11-22 RX ORDER — OXYCODONE HYDROCHLORIDE 5 MG/1
10 TABLET ORAL EVERY 4 HOURS PRN
Status: DISCONTINUED | OUTPATIENT
Start: 2019-11-22 | End: 2019-11-22 | Stop reason: HOSPADM

## 2019-11-22 RX ORDER — METHOCARBAMOL 500 MG/1
500 TABLET, FILM COATED ORAL EVERY 6 HOURS SCHEDULED
Status: DISCONTINUED | OUTPATIENT
Start: 2019-11-22 | End: 2019-11-22 | Stop reason: HOSPADM

## 2019-11-22 RX ORDER — FENTANYL CITRATE 50 UG/ML
INJECTION, SOLUTION INTRAMUSCULAR; INTRAVENOUS AS NEEDED
Status: DISCONTINUED | OUTPATIENT
Start: 2019-11-22 | End: 2019-11-22 | Stop reason: SURG

## 2019-11-22 RX ORDER — HYDROMORPHONE HCL/PF 1 MG/ML
0.5 SYRINGE (ML) INJECTION
Status: DISCONTINUED | OUTPATIENT
Start: 2019-11-22 | End: 2019-11-22 | Stop reason: HOSPADM

## 2019-11-22 RX ORDER — ONDANSETRON 2 MG/ML
INJECTION INTRAMUSCULAR; INTRAVENOUS AS NEEDED
Status: DISCONTINUED | OUTPATIENT
Start: 2019-11-22 | End: 2019-11-22 | Stop reason: SURG

## 2019-11-22 RX ORDER — ACETAMINOPHEN 325 MG/1
975 TABLET ORAL EVERY 8 HOURS SCHEDULED
Status: DISCONTINUED | OUTPATIENT
Start: 2019-11-22 | End: 2019-11-22 | Stop reason: HOSPADM

## 2019-11-22 RX ORDER — VANCOMYCIN HYDROCHLORIDE 1 G/20ML
INJECTION, POWDER, LYOPHILIZED, FOR SOLUTION INTRAVENOUS AS NEEDED
Status: DISCONTINUED | OUTPATIENT
Start: 2019-11-22 | End: 2019-11-22 | Stop reason: HOSPADM

## 2019-11-22 RX ORDER — OXYCODONE HYDROCHLORIDE AND ACETAMINOPHEN 5; 325 MG/1; MG/1
1 TABLET ORAL EVERY 8 HOURS PRN
Qty: 30 TABLET | Refills: 0 | Status: SHIPPED | OUTPATIENT
Start: 2019-11-22 | End: 2019-12-02

## 2019-11-22 RX ORDER — ONDANSETRON 2 MG/ML
4 INJECTION INTRAMUSCULAR; INTRAVENOUS EVERY 4 HOURS PRN
Status: DISCONTINUED | OUTPATIENT
Start: 2019-11-22 | End: 2019-11-22 | Stop reason: HOSPADM

## 2019-11-22 RX ORDER — GLYCOPYRROLATE 0.2 MG/ML
INJECTION INTRAMUSCULAR; INTRAVENOUS AS NEEDED
Status: DISCONTINUED | OUTPATIENT
Start: 2019-11-22 | End: 2019-11-22 | Stop reason: SURG

## 2019-11-22 RX ORDER — FENTANYL CITRATE/PF 50 MCG/ML
25 SYRINGE (ML) INJECTION
Status: DISCONTINUED | OUTPATIENT
Start: 2019-11-22 | End: 2019-11-22 | Stop reason: HOSPADM

## 2019-11-22 RX ORDER — CEPHALEXIN 500 MG/1
500 CAPSULE ORAL EVERY 12 HOURS SCHEDULED
Qty: 28 CAPSULE | Refills: 0 | Status: SHIPPED | OUTPATIENT
Start: 2019-11-22 | End: 2019-12-06

## 2019-11-22 RX ORDER — BUPIVACAINE HYDROCHLORIDE AND EPINEPHRINE 2.5; 5 MG/ML; UG/ML
INJECTION, SOLUTION INFILTRATION; PERINEURAL AS NEEDED
Status: DISCONTINUED | OUTPATIENT
Start: 2019-11-22 | End: 2019-11-22 | Stop reason: HOSPADM

## 2019-11-22 RX ORDER — MORPHINE SULFATE 4 MG/ML
2 INJECTION, SOLUTION INTRAMUSCULAR; INTRAVENOUS
Status: DISCONTINUED | OUTPATIENT
Start: 2019-11-22 | End: 2019-11-22 | Stop reason: HOSPADM

## 2019-11-22 RX ORDER — CEFAZOLIN SODIUM 2 G/50ML
SOLUTION INTRAVENOUS AS NEEDED
Status: DISCONTINUED | OUTPATIENT
Start: 2019-11-22 | End: 2019-11-22 | Stop reason: SURG

## 2019-11-22 RX ORDER — OXYCODONE HYDROCHLORIDE 5 MG/1
5 TABLET ORAL EVERY 4 HOURS PRN
Status: DISCONTINUED | OUTPATIENT
Start: 2019-11-22 | End: 2019-11-22 | Stop reason: HOSPADM

## 2019-11-22 RX ADMIN — GLYCOPYRROLATE 0.2 MG: 0.2 INJECTION INTRAMUSCULAR; INTRAVENOUS at 10:27

## 2019-11-22 RX ADMIN — FENTANYL CITRATE 25 MCG: 50 INJECTION, SOLUTION INTRAMUSCULAR; INTRAVENOUS at 12:45

## 2019-11-22 RX ADMIN — ROCURONIUM BROMIDE 50 MG: 10 INJECTION INTRAVENOUS at 10:26

## 2019-11-22 RX ADMIN — FENTANYL CITRATE 50 MCG: 50 INJECTION, SOLUTION INTRAMUSCULAR; INTRAVENOUS at 12:31

## 2019-11-22 RX ADMIN — DEXAMETHASONE SODIUM PHOSPHATE 6 MG: 4 INJECTION, SOLUTION INTRAMUSCULAR; INTRAVENOUS at 15:24

## 2019-11-22 RX ADMIN — KETOROLAC TROMETHAMINE 30 MG: 30 INJECTION, SOLUTION INTRAMUSCULAR; INTRAVENOUS at 11:45

## 2019-11-22 RX ADMIN — FENTANYL CITRATE 50 MCG: 50 INJECTION, SOLUTION INTRAMUSCULAR; INTRAVENOUS at 11:03

## 2019-11-22 RX ADMIN — ONDANSETRON 4 MG: 2 INJECTION INTRAMUSCULAR; INTRAVENOUS at 12:14

## 2019-11-22 RX ADMIN — GLYCOPYRROLATE 0.6 MG: 0.2 INJECTION INTRAMUSCULAR; INTRAVENOUS at 12:15

## 2019-11-22 RX ADMIN — HYDROMORPHONE HYDROCHLORIDE 0.5 MG: 1 INJECTION, SOLUTION INTRAMUSCULAR; INTRAVENOUS; SUBCUTANEOUS at 13:25

## 2019-11-22 RX ADMIN — CEFAZOLIN SODIUM 2000 MG: 2 SOLUTION INTRAVENOUS at 10:20

## 2019-11-22 RX ADMIN — FENTANYL CITRATE 50 MCG: 50 INJECTION, SOLUTION INTRAMUSCULAR; INTRAVENOUS at 11:30

## 2019-11-22 RX ADMIN — NEOSTIGMINE METHYLSULFATE 4 MG: 1 INJECTION INTRAVENOUS at 12:16

## 2019-11-22 RX ADMIN — ONDANSETRON 4 MG: 2 INJECTION INTRAMUSCULAR; INTRAVENOUS at 13:59

## 2019-11-22 RX ADMIN — PROPOFOL 50 MG: 10 INJECTION, EMULSION INTRAVENOUS at 12:10

## 2019-11-22 RX ADMIN — PROPOFOL 200 MG: 10 INJECTION, EMULSION INTRAVENOUS at 10:25

## 2019-11-22 RX ADMIN — FENTANYL CITRATE 50 MCG: 50 INJECTION, SOLUTION INTRAMUSCULAR; INTRAVENOUS at 10:21

## 2019-11-22 RX ADMIN — SODIUM CHLORIDE, SODIUM LACTATE, POTASSIUM CHLORIDE, AND CALCIUM CHLORIDE 75 ML/HR: .6; .31; .03; .02 INJECTION, SOLUTION INTRAVENOUS at 08:50

## 2019-11-22 RX ADMIN — CEFAZOLIN SODIUM 2000 MG: 2 SOLUTION INTRAVENOUS at 09:43

## 2019-11-22 RX ADMIN — CHLORHEXIDINE GLUCONATE 0.12% ORAL RINSE 15 ML: 1.2 LIQUID ORAL at 08:51

## 2019-11-22 RX ADMIN — FENTANYL CITRATE 25 MCG: 50 INJECTION, SOLUTION INTRAMUSCULAR; INTRAVENOUS at 12:51

## 2019-11-22 RX ADMIN — HYDROMORPHONE HYDROCHLORIDE 0.5 MG: 1 INJECTION, SOLUTION INTRAMUSCULAR; INTRAVENOUS; SUBCUTANEOUS at 13:09

## 2019-11-22 RX ADMIN — MIDAZOLAM 2 MG: 1 INJECTION INTRAMUSCULAR; INTRAVENOUS at 10:20

## 2019-11-22 NOTE — ANESTHESIA POSTPROCEDURE EVALUATION
Post-Op Assessment Note    CV Status:  Stable  Pain Score: 5    Pain management: adequate     Mental Status:  Sleepy   Hydration Status:  Stable   PONV Controlled:  None  Airway: intubated   Post Op Vitals Reviewed: Yes      Staff: CRNA           BP      Temp      Pulse     Resp      SpO2

## 2019-11-22 NOTE — H&P
H and P reviewed in chart, ongoing back and leg pain in the setting of lumbar radiculopathy   /61   Pulse 77   Temp 98 1 °F (36 7 °C) (Oral)   Resp 16   Ht 5' 11" (1 803 m)   Wt 89 9 kg (198 lb 3 2 oz)   SpO2 97%   BMI 27 64 kg/m²   Lumbar decompression as scheduled today

## 2019-11-22 NOTE — OP NOTE
OPERATIVE REPORT  PATIENT NAME: Ailyn Carreon    :  1985  MRN: [id number]  Pt Location: [facility] OR ROOM 02    SURGERY DATE: 2019    Surgeon(s) and Role:     Obdulia Oshea MD - Primary    Preop Diagnosis:  Lumbar radiculopathy [M54 16]  Spinal stenosis of lumbar region, unspecified whether neurogenic claudication present [M48 061]    Post-Op Diagnosis Codes:     * Lumbar radiculopathy [M54 16]     * Spinal stenosis of lumbar region, unspecified whether neurogenic claudication present [M48 061]    Procedure(s) (LRB):  LAMINECTOMY LUMBAR/THORACIC; L4/5 microdiscectomy (Left)    Specimen(s):  * No specimens in log *    Estimated Blood Loss:   Minimal    Drains:  * No LDAs found *    Anesthesia Type:   General    Operative Indications:  Lumbar radiculopathy [M54 16]  Spinal stenosis of lumbar region, unspecified whether neurogenic claudication present [M48 061]      Operative Findings:  Large extruded disc fragment     Complications:   None    Procedure and Technique:       INDICATION The patient is a pleasant  -year-old with a long-standing history of left buttocks and leg dominant pain  Despite best conservative measures, which included physical therapy, epidural steroid injections and medications, the patient continues to experience unremitting pain, worse with weight-bearing and ambulation  Preoperative imaging demonstrates  L4-L5 stenosis secondary to a concentric foraminal narrowing  The bulk of the stenosis was caused by a left paracentral L4-L5 disc herniation/extrusion with clear nerve impingement  Due to the unremitting nature of symptoms despite best conservative measures, the patient elected to proceed with a single level left L4-L5 microdiscectomy for symptomatic relief  OPERATIVE TECHNIQUE Under general anesthetic, the patient was intubated in the usual fashion and positioned prone on the operating table with all pressure points well padded   We took a lateral fluoroscopic shoot through with radiopaque marker to confirm levels  Following level confirmation, we sterilely prepped and draped the entire back in the usual fashion  The proposed incision site was outlined with a sterile marker and infiltrated with approximately 5 mL of Xylocaine  We incised the skin sharply with a #10 skin knife  Hemostasis was maintained using bipolar cautery  The remainder of the gross dissection was carried out using combination of self-retaining retractors and Bovie monopolar cautery  Once we established dissection, we took an additional lateral fluoroscopic shoot through with radiopaque marker to confirm levels  Following level confirmation, we used a high speed bur to carry out a modest laminotomy, as well as a medial facetectomy of the L4 level  Once this was done, we undercut the underlying and significantly hypertrophied ligamentum flavum using sharp angled curettes  Once we identified the lateral border of the nerve root, we reflected it medially and proceeded to explore the foramen  The foramen was found to be severely stenotic as were unable to pass even a nerve hook through it  Initially, we approached the level of the disc  Using a #4 Primus Lory, we made a small annulotomy in the posterolateral portion of the disc space  We were able to procure multiple fragments partially calcified disc material using a combination of variably angled curettes, as well as a micropituitary rongeur  With the discectomy completed, we further explored the foramen  We found that although the foraminal stenosis was much improved, there was still residual spondyloarthropathy  Using a combination of straight and curved 2 and 3 mm Kerrison rongeurs, we completed the foraminotomy  At the end of decompression, we were quite satisfied the disc space level, as well as the foramen, had been well decompressed   Following meticulous hemostasis using bipolar cautery and FloSeal foam, we irrigated the field copiously with antibiotic-infiltrated normal saline  Once this was done, we ensured final hemostasis  We placed 1 gram of dry vancomycin powder into the defect  The overlying fascia layer was then reapproximated using 0 Vicryl interrupted stitch  Next, 2-0 Vicryl was used to approximate the dermis layer  Final skin layer involved staples, antibiotic ointment,Telfa, 4 x 4 gauze, and Tegaderm  Prior to final closure, we infiltrated the carlos-incisional site with approximately 5 mL of Marcaine with epinephrine  It should be noted that the microscope was used throughout the case, in particular the portions of the case devoted to microsurgical dissection  All counts including that of sponge and needles were correct at the end of the case  ESTIMATED BLOOD LOSS Negligible  Following extubation, the patient was extubated with baseline strength in both distal lower extremities        I was present for the entire procedure    Patient Disposition:  extubated and stable    SIGNATURE: Lobo Adames MD  DATE: November 22, 2019  TIME: 10:12 AM

## 2019-11-22 NOTE — PROGRESS NOTES
Patient states he has had issues voiding since yesterday  Only 278 ml of urine in bladder  Dr Diego Dinero aware  Patient instructed to call dr ryan's office if issue with voiding later today

## 2019-11-22 NOTE — ANESTHESIA PREPROCEDURE EVALUATION
Review of Systems/Medical History  Patient summary reviewed  Chart reviewed      Cardiovascular   Pulmonary  Smoker cigarette smoker  , Tobacco cessation counseling given Cumulative Pack Years: 20, Sleep apnea ,        GI/Hepatic            Endo/Other     GYN       Hematology   Musculoskeletal       Neurology   Psychology     Chronic pain,            Physical Exam    Airway    Mallampati score: II         Dental   Comment: Multiple missing an d broken teeth, multiple caries, very poor dentition  ,     Cardiovascular  Cardiovascular exam normal    Pulmonary  Pulmonary exam normal     Other Findings        Anesthesia Plan  ASA Score- 2     Anesthesia Type- general with ASA Monitors  Additional Monitors:   Airway Plan: ETT  Plan Factors-    Induction- intravenous  Postoperative Plan- Plan for postoperative opioid use  Informed Consent- Anesthetic plan and risks discussed with patient  I personally reviewed this patient with the CRNA  Discussed and agreed on the Anesthesia Plan with the CRNA  Ai Castro

## 2019-11-22 NOTE — PROGRESS NOTES
Did not perform Iodine nasal swab   Patient reports "eating a lot of seafood but sometimes lip swells "

## 2019-11-25 ENCOUNTER — TELEPHONE (OUTPATIENT)
Dept: NEUROSURGERY | Facility: CLINIC | Age: 34
End: 2019-11-25

## 2019-11-26 NOTE — TELEPHONE ENCOUNTER
Wife called reporting the pt was provided with bacitracin at d/c and wanted to verify to use  She also reports the pt is refusing to complete the antibx because it upsets his stomach  Suggested taking it with food and she reports he has  Instructed her to encourage him to take but if he refuses he cant be made to do it  While speaking completed post-op call  D/C instructions were provided and reviewed  He is aware of f/u appts with times and location  He is doing well with pain control requiring very little meds  She was encouraged to call back with any future concerns

## 2019-12-06 ENCOUNTER — CLINICAL SUPPORT (OUTPATIENT)
Dept: NEUROSURGERY | Facility: CLINIC | Age: 34
End: 2019-12-06

## 2019-12-06 ENCOUNTER — TELEPHONE (OUTPATIENT)
Dept: NEUROSURGERY | Facility: CLINIC | Age: 34
End: 2019-12-06

## 2019-12-06 VITALS
RESPIRATION RATE: 16 BRPM | TEMPERATURE: 98.2 F | WEIGHT: 192 LBS | DIASTOLIC BLOOD PRESSURE: 78 MMHG | BODY MASS INDEX: 26.88 KG/M2 | SYSTOLIC BLOOD PRESSURE: 120 MMHG | HEIGHT: 71 IN

## 2019-12-06 DIAGNOSIS — Z98.890 STATUS POST SURGERY: Primary | ICD-10-CM

## 2019-12-06 PROCEDURE — 99024 POSTOP FOLLOW-UP VISIT: CPT

## 2019-12-06 NOTE — PROGRESS NOTES
Post-Op Visit-Neurosurgery    Akua Reyes 36 y o  male MRN: [id number]    Chief Complaint:  Patient presents post: LAMINECTOMY LUMBAR/THORACIC; L4/5 microdiscectomy (Left)    History of Present Illness:  Patient presents for 2 week POV for incision check unaccompanied and walking without an assistive device  He reports that he is doing okay overall and denies any incisional issues or fevers  He reports having some pain in his low back and b/l hips (L>R) and into his thighs since the surgery but does not have shooting pain down his leg anymore  Denies any weakness since the surgery  Current Outpatient Medications:     amoxicillin (AMOXIL) 500 mg capsule, For an upcoming dental procedure, Disp: , Rfl: 0    cephalexin (KEFLEX) 500 mg capsule, Take 1 capsule (500 mg total) by mouth every 12 (twelve) hours for 14 days (Patient not taking: Reported on 12/6/2019), Disp: 28 capsule, Rfl: 0    gabapentin (NEURONTIN) 300 mg capsule, Take 1 capsule (300 mg total) by mouth 3 (three) times a day (Patient not taking: Reported on 11/6/2019), Disp: 90 capsule, Rfl: 2   No Known Allergies     Assessment:  Vitals:    12/06/19 1110   BP: 120/78   Resp: 16   Temp: 98 2 °F (36 8 °C)   Weight: 87 1 kg (192 lb)   Height: 5' 11" (1 803 m)      Pain Score:   4    Wound Exam: Incision well approximated  Mild erythema surrounding staples  No edema or drainage present  Location: low back  Procedure: 15 staples removed  Cleansed with NSS  Incision GM  Complications: None  Discussion/Summary:  Reviewed incision care with patient including daily observation for s/s infection including: increased erythema, edema, drainage, dehiscence of incision or fever >101  Should these be observed, he understands that he is to call and/or return immediately for reassessment  Advised patient to continue cleansing area with mild soap and water and pat dry  Not to apply any lotions, creams, or ointments, & not to submerge in any water for two more weeks  He is to maintain activity restrictions until cleared by the surgeon  Activity levels were also reviewed with the patient in detail, he is to slowly increase his level of activity and ambulation is encouraged as tolerated  Verified date/time/location of upcoming POV and reminded him to call the office with any further questions or concerns, or if any incisional issues or fevers would arise  Patient would like an x-ray of his low back and/or left hip because he states that it has really been bothering him a lot  Advised him that we don't typically do an x-ray after this surgery but that I would speak with the surgeon and call him with his recommendations

## 2019-12-06 NOTE — TELEPHONE ENCOUNTER
----- Message from Ava Senior MD sent at 12/6/2019 12:25 PM EST -----  Regarding: RE: patient seen today for 2 wk POV  I can explore that with him at the 6 week kary if his symptoms do not michael  dhz  ----- Message -----  From: Mandie Juárez RN  Sent: 12/6/2019  11:35 AM EST  To: Ava Senior MD  Subject: patient seen today for 2 wk POV                  Saw patient today for 2 week POV  Having pain in low back and into b/l hips (L>R)  He is requesting an x-ray of his low back and/or left hip  I advised him that we typically do not order x-rays after this type of surgery but that I would speak with you and call the patient back

## 2019-12-06 NOTE — TELEPHONE ENCOUNTER
Called patient to inform him of this with no answer  Left detailed message on machine and he may call back if he has any further questions

## 2020-03-10 ENCOUNTER — HOSPITAL ENCOUNTER (EMERGENCY)
Facility: HOSPITAL | Age: 35
Discharge: HOME | End: 2020-03-10
Attending: EMERGENCY MEDICINE
Payer: COMMERCIAL

## 2020-03-10 ENCOUNTER — APPOINTMENT (EMERGENCY)
Dept: RADIOLOGY | Facility: HOSPITAL | Age: 35
End: 2020-03-10
Payer: COMMERCIAL

## 2020-03-10 VITALS
HEART RATE: 115 BPM | WEIGHT: 192.02 LBS | DIASTOLIC BLOOD PRESSURE: 71 MMHG | BODY MASS INDEX: 26.78 KG/M2 | RESPIRATION RATE: 20 BRPM | SYSTOLIC BLOOD PRESSURE: 141 MMHG | OXYGEN SATURATION: 96 % | TEMPERATURE: 98.9 F

## 2020-03-10 DIAGNOSIS — J10.1 INFLUENZA B: Primary | ICD-10-CM

## 2020-03-10 LAB
FLUAV RNA NPH QL NAA+PROBE: ABNORMAL
FLUBV RNA NPH QL NAA+PROBE: DETECTED
RSV RNA NPH QL NAA+PROBE: ABNORMAL

## 2020-03-10 PROCEDURE — 99283 EMERGENCY DEPT VISIT LOW MDM: CPT

## 2020-03-10 PROCEDURE — 99285 EMERGENCY DEPT VISIT HI MDM: CPT | Performed by: EMERGENCY MEDICINE

## 2020-03-10 PROCEDURE — 94640 AIRWAY INHALATION TREATMENT: CPT

## 2020-03-10 PROCEDURE — 87631 RESP VIRUS 3-5 TARGETS: CPT | Performed by: EMERGENCY MEDICINE

## 2020-03-10 PROCEDURE — 71046 X-RAY EXAM CHEST 2 VIEWS: CPT

## 2020-03-10 RX ORDER — ALBUTEROL SULFATE 2.5 MG/3ML
2.5 SOLUTION RESPIRATORY (INHALATION) ONCE
Status: COMPLETED | OUTPATIENT
Start: 2020-03-10 | End: 2020-03-10

## 2020-03-10 RX ORDER — IBUPROFEN 600 MG/1
600 TABLET ORAL ONCE
Status: COMPLETED | OUTPATIENT
Start: 2020-03-10 | End: 2020-03-10

## 2020-03-10 RX ORDER — ALBUTEROL SULFATE 90 UG/1
2 AEROSOL, METERED RESPIRATORY (INHALATION) ONCE
Status: COMPLETED | OUTPATIENT
Start: 2020-03-10 | End: 2020-03-10

## 2020-03-10 RX ORDER — IBUPROFEN 600 MG/1
600 TABLET ORAL EVERY 6 HOURS PRN
Qty: 30 TABLET | Refills: 0 | Status: SHIPPED | OUTPATIENT
Start: 2020-03-10 | End: 2021-01-05

## 2020-03-10 RX ORDER — ALBUTEROL SULFATE 90 UG/1
2 AEROSOL, METERED RESPIRATORY (INHALATION) EVERY 4 HOURS PRN
Qty: 1 INHALER | Refills: 0 | Status: SHIPPED | OUTPATIENT
Start: 2020-03-10 | End: 2021-01-05

## 2020-03-10 RX ADMIN — IBUPROFEN 600 MG: 600 TABLET ORAL at 20:54

## 2020-03-10 RX ADMIN — ALBUTEROL SULFATE 2 PUFF: 90 AEROSOL, METERED RESPIRATORY (INHALATION) at 20:54

## 2020-03-10 RX ADMIN — ALBUTEROL SULFATE 2.5 MG: 2.5 SOLUTION RESPIRATORY (INHALATION) at 20:55

## 2020-03-17 NOTE — ED PROVIDER NOTES
History  Chief Complaint   Patient presents with    Cough     pt c/o cough started yesterday with watery diarrhea & chest "burning"-pt notes son had flu 2 weeks ago       History provided by:  Patient  Cough   Cough characteristics:  Dry  Sputum characteristics:  Nondescript  Severity:  Moderate  Onset quality:  Gradual  Duration:  2 days  Timing:  Constant  Progression:  Worsening  Chronicity:  New  Smoker: yes    Context: sick contacts (son diagnosed with the flu recently)    Relieved by:  Nothing  Worsened by:  Deep breathing  Ineffective treatments:  None tried  Associated symptoms: chills, fever, shortness of breath and wheezing    Associated symptoms: no chest pain        Prior to Admission Medications   Prescriptions Last Dose Informant Patient Reported? Taking?   amoxicillin (AMOXIL) 500 mg capsule  Self Yes No   Sig: For an upcoming dental procedure   gabapentin (NEURONTIN) 300 mg capsule  Self No No   Sig: Take 1 capsule (300 mg total) by mouth 3 (three) times a day   Patient not taking: Reported on 11/6/2019      Facility-Administered Medications: None       History reviewed  No pertinent past medical history  Past Surgical History:   Procedure Laterality Date    ABCESS DRAINAGE  10/29/2018    carlos anal    ANAL FISTULOTOMY N/A 3/14/2019    Procedure: FISTULOTOMY;  Surgeon: Dmitriy Espinoza MD;  [facility];  Service: Colorectal    ND COLONOSCOPY FLX DX W/COLLJ SPEC WHEN PFRMD N/A 11/21/2018    Procedure: COLONOSCOPY;  Surgeon: Gerald Vallejo MD;  Location[facility]; Service: Gastroenterology    ND ESOPHAGOGASTRODUODENOSCOPY TRANSORAL DIAGNOSTIC N/A 11/21/2018    Procedure: ESOPHAGOGASTRODUODENOSCOPY (EGD); Surgeon: Gerald Vallejo MD;  [facility]; Service: Gastroenterology    ND ESOPHAGOGASTRODUODENOSCOPY TRANSORAL DIAGNOSTIC N/A 2/7/2019    Procedure: ESOPHAGOGASTRODUODENOSCOPY (EGD); Surgeon: Gerald Vallejo MD;  [facility];   Service: Gastroenterology  DE LAMNOTMY INCL W/DCMPRSN NRV ROOT 1 INTRSPC LUMBR Left 11/22/2019    Procedure: LAMINECTOMY LUMBAR/THORACIC; L4/5 microdiscectomy;  Surgeon: Molly Madrigal MD;  Location:  MAIN OR;  Service: Neurosurgery    TONSILLECTOMY         Family History   Problem Relation Age of Onset    Diabetes Father      I have reviewed and agree with the history as documented  E-Cigarette/Vaping     E-Cigarette/Vaping Substances     Social History     Tobacco Use    Smoking status: Current Every Day Smoker     Packs/day: 0 50    Smokeless tobacco: Never Used   Substance Use Topics    Alcohol use: Yes     Comment: very rarely    Drug use: Not Currently     Frequency: 2 0 times per week     Types: Marijuana     Comment: none recently       Review of Systems   Constitutional: Positive for chills and fever  Respiratory: Positive for cough, shortness of breath and wheezing  Cardiovascular: Negative for chest pain  All other systems reviewed and are negative  Physical Exam  Physical Exam   Constitutional: He appears well-developed and well-nourished  HENT:   Head: Normocephalic and atraumatic  Eyes: Pupils are equal, round, and reactive to light  EOM are normal    Neck: Normal range of motion  Neck supple  Cardiovascular: Tachycardia present  Pulmonary/Chest: Effort normal  No respiratory distress  He has wheezes  Abdominal: Soft  Bowel sounds are normal  He exhibits no distension  Musculoskeletal: He exhibits no edema or deformity  Neurological: He is alert  Skin: Skin is warm  No pallor  Psychiatric: He has a normal mood and affect  Vitals reviewed        Vital Signs  ED Triage Vitals [03/10/20 1832]   Temperature Pulse Respirations Blood Pressure SpO2   98 9 °F (37 2 °C) (!) 115 20 141/71 96 %      Temp Source Heart Rate Source Patient Position - Orthostatic VS BP Location FiO2 (%)   Oral -- -- Left arm --      Pain Score       --           Vitals:    03/10/20 1832   BP: 141/71   Pulse: (!) 115 Visual Acuity      ED Medications  Medications   ibuprofen (MOTRIN) tablet 600 mg (600 mg Oral Given 3/10/20 2054)   albuterol inhalation solution 2 5 mg (2 5 mg Nebulization Given 3/10/20 2055)   albuterol (PROVENTIL HFA,VENTOLIN HFA) inhaler 2 puff (2 puffs Inhalation Given 3/10/20 2054)       Diagnostic Studies  Results Reviewed     Procedure Component Value Units Date/Time    Influenza A/B and RSV PCR [[id number]]  (Abnormal) Collected:  03/10/20 1937    Lab Status:  Final result Specimen:  Nose Updated:  03/10/20 2014     INFLUENZA A PCR None Detected     INFLUENZA B PCR Detected     RSV PCR None Detected                 XR chest 2 views   ED Interpretation by Teresa Delvalle MD (03/10 2001)   NAD      Final Result by Ángela Serrano MD (03/11 0805)      No acute cardiopulmonary disease  Workstation performed: [id number]                    Procedures  Procedures         ED Course                                 MDM  Number of Diagnoses or Management Options  Influenza B: new and requires workup     Amount and/or Complexity of Data Reviewed  Clinical lab tests: ordered and reviewed  Tests in the radiology section of CPT®: ordered  Independent visualization of images, tracings, or specimens: yes          Disposition  Final diagnoses:   Influenza B     Time reflects when diagnosis was documented in both MDM as applicable and the Disposition within this note     Time User Action Codes Description Comment    3/10/2020  8:48 PM Sherri Loya 89, [address] [J10 1] Influenza B       ED Disposition     ED Disposition Condition Date/Time Comment    Discharge Stable Tue Mar 10, 2020  8:48 PM Saeid Serrano discharge to home/self care              Follow-up Information     Follow up With Specialties Details Why Contact Info Additional 2000 [facility] Emergency Department Emergency Medicine Go to  If symptoms worsen 34 [facility] [address] [phone] [place] [facility], [address] [place], [place], 500 [facility], DO Internal Medicine Call  If symptoms worsen [address] 830 [facility]  [phone]             Discharge Medication List as of 3/10/2020  8:50 PM      CONTINUE these medications which have NOT CHANGED    Details   amoxicillin (AMOXIL) 500 mg capsule For an upcoming dental procedure, Historical Med      gabapentin (NEURONTIN) 300 mg capsule Take 1 capsule (300 mg total) by mouth 3 (three) times a day, Starting Mon 9/9/2019, Normal           No discharge procedures on file      PDMP Review     None          ED Provider  Electronically Signed by           Rosa Huerta MD  03/16/20 9282

## 2020-04-29 ENCOUNTER — HOSPITAL ENCOUNTER (EMERGENCY)
Facility: HOSPITAL | Age: 35
Discharge: HOME | End: 2020-04-29
Attending: EMERGENCY MEDICINE | Admitting: EMERGENCY MEDICINE
Payer: COMMERCIAL

## 2020-04-29 VITALS
WEIGHT: 190 LBS | TEMPERATURE: 98.2 F | DIASTOLIC BLOOD PRESSURE: 70 MMHG | BODY MASS INDEX: 26.5 KG/M2 | RESPIRATION RATE: 18 BRPM | SYSTOLIC BLOOD PRESSURE: 127 MMHG | OXYGEN SATURATION: 96 % | HEART RATE: 73 BPM

## 2020-04-29 DIAGNOSIS — S05.01XA ABRASION OF RIGHT CORNEA, INITIAL ENCOUNTER: Primary | ICD-10-CM

## 2020-04-29 PROCEDURE — 99283 EMERGENCY DEPT VISIT LOW MDM: CPT

## 2020-04-29 PROCEDURE — 99284 EMERGENCY DEPT VISIT MOD MDM: CPT | Performed by: EMERGENCY MEDICINE

## 2020-04-29 RX ORDER — TETRACAINE HYDROCHLORIDE 5 MG/ML
1 SOLUTION OPHTHALMIC ONCE
Status: COMPLETED | OUTPATIENT
Start: 2020-04-29 | End: 2020-04-29

## 2020-04-29 RX ORDER — OXYCODONE HYDROCHLORIDE AND ACETAMINOPHEN 5; 325 MG/1; MG/1
1 TABLET ORAL EVERY 4 HOURS PRN
Qty: 8 TABLET | Refills: 0 | Status: SHIPPED | OUTPATIENT
Start: 2020-04-29 | End: 2021-01-05

## 2020-04-29 RX ORDER — ERYTHROMYCIN 5 MG/G
OINTMENT OPHTHALMIC
Qty: 1 G | Refills: 0 | Status: SHIPPED | OUTPATIENT
Start: 2020-04-29 | End: 2021-01-05

## 2020-04-29 RX ADMIN — FLUORESCEIN SODIUM 1 STRIP: 1 STRIP OPHTHALMIC at 11:11

## 2020-04-29 RX ADMIN — TETRACAINE HYDROCHLORIDE 1 DROP: 5 SOLUTION OPHTHALMIC at 11:11

## 2020-06-11 ENCOUNTER — HOSPITAL ENCOUNTER (EMERGENCY)
Facility: HOSPITAL | Age: 35
Discharge: HOME | End: 2020-06-11
Attending: EMERGENCY MEDICINE | Admitting: EMERGENCY MEDICINE
Payer: COMMERCIAL

## 2020-06-11 VITALS
DIASTOLIC BLOOD PRESSURE: 81 MMHG | TEMPERATURE: 98 F | HEIGHT: 71 IN | SYSTOLIC BLOOD PRESSURE: 129 MMHG | HEART RATE: 75 BPM | OXYGEN SATURATION: 98 % | BODY MASS INDEX: 27.25 KG/M2 | WEIGHT: 194.67 LBS | RESPIRATION RATE: 20 BRPM

## 2020-06-11 DIAGNOSIS — K08.89 PAIN, DENTAL: Primary | ICD-10-CM

## 2020-06-11 PROCEDURE — 99284 EMERGENCY DEPT VISIT MOD MDM: CPT | Performed by: PHYSICIAN ASSISTANT

## 2020-06-11 PROCEDURE — 99283 EMERGENCY DEPT VISIT LOW MDM: CPT

## 2020-06-11 RX ORDER — CLINDAMYCIN HYDROCHLORIDE 300 MG/1
300 CAPSULE ORAL 3 TIMES DAILY
Qty: 30 CAPSULE | Refills: 0 | Status: SHIPPED | OUTPATIENT
Start: 2020-06-11 | End: 2020-06-21

## 2020-06-11 RX ORDER — OXYCODONE HYDROCHLORIDE AND ACETAMINOPHEN 5; 325 MG/1; MG/1
1 TABLET ORAL EVERY 6 HOURS PRN
Qty: 15 TABLET | Refills: 0 | Status: SHIPPED | OUTPATIENT
Start: 2020-06-11 | End: 2020-06-14

## 2020-07-13 ENCOUNTER — APPOINTMENT (EMERGENCY)
Dept: CT IMAGING | Facility: HOSPITAL | Age: 35
End: 2020-07-13
Payer: COMMERCIAL

## 2020-07-13 ENCOUNTER — HOSPITAL ENCOUNTER (EMERGENCY)
Facility: HOSPITAL | Age: 35
Discharge: HOME | End: 2020-07-13
Attending: EMERGENCY MEDICINE
Payer: COMMERCIAL

## 2020-07-13 VITALS
OXYGEN SATURATION: 97 % | HEART RATE: 63 BPM | SYSTOLIC BLOOD PRESSURE: 111 MMHG | DIASTOLIC BLOOD PRESSURE: 51 MMHG | BODY MASS INDEX: 27.15 KG/M2 | WEIGHT: 194.67 LBS | RESPIRATION RATE: 20 BRPM | TEMPERATURE: 97.4 F

## 2020-07-13 DIAGNOSIS — L03.311 ABDOMINAL WALL CELLULITIS: Primary | ICD-10-CM

## 2020-07-13 LAB
ALBUMIN SERPL BCP-MCNC: 4.1 G/DL (ref 3.5–5)
ALP SERPL-CCNC: 72 U/L (ref 46–116)
ALT SERPL W P-5'-P-CCNC: 26 U/L (ref 12–78)
ANION GAP SERPL CALCULATED.3IONS-SCNC: 8 MMOL/L (ref 4–13)
AST SERPL W P-5'-P-CCNC: 20 U/L (ref 5–45)
BASOPHILS # BLD AUTO: 0.04 THOUSANDS/ΜL (ref 0–0.1)
BASOPHILS NFR BLD AUTO: 1 % (ref 0–1)
BILIRUB SERPL-MCNC: 0.4 MG/DL (ref 0.2–1)
BUN SERPL-MCNC: 15 MG/DL (ref 5–25)
CALCIUM SERPL-MCNC: 8.9 MG/DL (ref 8.3–10.1)
CHLORIDE SERPL-SCNC: 107 MMOL/L (ref 100–108)
CO2 SERPL-SCNC: 27 MMOL/L (ref 21–32)
CREAT SERPL-MCNC: 0.83 MG/DL (ref 0.6–1.3)
EOSINOPHIL # BLD AUTO: 0.39 THOUSAND/ΜL (ref 0–0.61)
EOSINOPHIL NFR BLD AUTO: 5 % (ref 0–6)
ERYTHROCYTE [DISTWIDTH] IN BLOOD BY AUTOMATED COUNT: 12.9 % (ref 11.6–15.1)
GFR SERPL CREATININE-BSD FRML MDRD: 116 ML/MIN/1.73SQ M
GLUCOSE SERPL-MCNC: 91 MG/DL (ref 65–140)
HCT VFR BLD AUTO: 41.4 % (ref 36.5–49.3)
HGB BLD-MCNC: 13.9 G/DL (ref 12–17)
IMM GRANULOCYTES # BLD AUTO: 0.02 THOUSAND/UL (ref 0–0.2)
IMM GRANULOCYTES NFR BLD AUTO: 0 % (ref 0–2)
LIPASE SERPL-CCNC: 73 U/L (ref 73–393)
LYMPHOCYTES # BLD AUTO: 3.04 THOUSANDS/ΜL (ref 0.6–4.47)
LYMPHOCYTES NFR BLD AUTO: 37 % (ref 14–44)
MCH RBC QN AUTO: 32.4 PG (ref 26.8–34.3)
MCHC RBC AUTO-ENTMCNC: 33.6 G/DL (ref 31.4–37.4)
MCV RBC AUTO: 97 FL (ref 82–98)
MONOCYTES # BLD AUTO: 0.76 THOUSAND/ΜL (ref 0.17–1.22)
MONOCYTES NFR BLD AUTO: 9 % (ref 4–12)
NEUTROPHILS # BLD AUTO: 4.03 THOUSANDS/ΜL (ref 1.85–7.62)
NEUTS SEG NFR BLD AUTO: 48 % (ref 43–75)
NRBC BLD AUTO-RTO: 0 /100 WBCS
PLATELET # BLD AUTO: 260 THOUSANDS/UL (ref 149–390)
PMV BLD AUTO: 9.1 FL (ref 8.9–12.7)
POTASSIUM SERPL-SCNC: 4 MMOL/L (ref 3.5–5.3)
PROT SERPL-MCNC: 7.2 G/DL (ref 6.4–8.2)
RBC # BLD AUTO: 4.29 MILLION/UL (ref 3.88–5.62)
SARS-COV-2 RNA RESP QL NAA+PROBE: NEGATIVE
SODIUM SERPL-SCNC: 142 MMOL/L (ref 136–145)
WBC # BLD AUTO: 8.28 THOUSAND/UL (ref 4.31–10.16)

## 2020-07-13 PROCEDURE — 87635 SARS-COV-2 COVID-19 AMP PRB: CPT | Performed by: PHYSICIAN ASSISTANT

## 2020-07-13 PROCEDURE — 85025 COMPLETE CBC W/AUTO DIFF WBC: CPT | Performed by: PHYSICIAN ASSISTANT

## 2020-07-13 PROCEDURE — 80053 COMPREHEN METABOLIC PANEL: CPT | Performed by: PHYSICIAN ASSISTANT

## 2020-07-13 PROCEDURE — 36415 COLL VENOUS BLD VENIPUNCTURE: CPT | Performed by: PHYSICIAN ASSISTANT

## 2020-07-13 PROCEDURE — 99285 EMERGENCY DEPT VISIT HI MDM: CPT | Performed by: PHYSICIAN ASSISTANT

## 2020-07-13 PROCEDURE — 99284 EMERGENCY DEPT VISIT MOD MDM: CPT

## 2020-07-13 PROCEDURE — 74177 CT ABD & PELVIS W/CONTRAST: CPT

## 2020-07-13 PROCEDURE — 83690 ASSAY OF LIPASE: CPT | Performed by: PHYSICIAN ASSISTANT

## 2020-07-13 RX ORDER — CEPHALEXIN 250 MG/1
500 CAPSULE ORAL ONCE
Status: COMPLETED | OUTPATIENT
Start: 2020-07-13 | End: 2020-07-13

## 2020-07-13 RX ORDER — CEPHALEXIN 500 MG/1
500 CAPSULE ORAL 4 TIMES DAILY
Qty: 28 CAPSULE | Refills: 0 | Status: SHIPPED | OUTPATIENT
Start: 2020-07-13 | End: 2020-07-20

## 2020-07-13 RX ADMIN — CEPHALEXIN 500 MG: 250 CAPSULE ORAL at 20:04

## 2020-07-13 RX ADMIN — IOHEXOL 100 ML: 350 INJECTION, SOLUTION INTRAVENOUS at 18:59

## 2020-07-13 NOTE — ED PROVIDER NOTES
History  Chief Complaint   Patient presents with    Abdominal Pain     pt reports abd pain and diarhhea x2 days  states his belly button is bleeding and leaking and he is extremely tired all the time  37 yo male patient here for evaluation of abdominal pain  Periumbilical  Comes and goes  Redness/erythema around umbilicus  Diarrhea  No fever, chills, n/v  No melena or hematochezia  No chest pain but occasional sob  He's been evaluated extensively for this  Previously told he had crohn's  At one point he was told "you just have a dirty belly button"  History provided by:  Patient   used: No    Abdominal Pain   Pain location:  Periumbilical  Pain quality: aching    Pain radiates to:  Does not radiate  Pain severity:  Mild  Onset quality:  Gradual  Duration:  2 days  Timing:  Intermittent  Progression:  Waxing and waning  Chronicity:  New  Context: not alcohol use, not awakening from sleep, not diet changes, not eating, not laxative use, not medication withdrawal, not previous surgeries, not recent illness, not recent sexual activity, not recent travel, not retching, not sick contacts, not suspicious food intake and not trauma    Relieved by:  Nothing  Worsened by:  Nothing  Ineffective treatments:  None tried  Associated symptoms: diarrhea and shortness of breath    Associated symptoms: no chest pain, no chills, no constipation, no cough, no dysuria, no fatigue, no fever, no hematuria, no nausea, no sore throat and no vomiting        Prior to Admission Medications   Prescriptions Last Dose Informant Patient Reported? Taking?    albuterol (PROVENTIL HFA,VENTOLIN HFA) 90 mcg/act inhaler   No No   Sig: Inhale 2 puffs every 4 (four) hours as needed for wheezing   amoxicillin (AMOXIL) 500 mg capsule  Self Yes No   Sig: For an upcoming dental procedure   erythromycin (ILOTYCIN) ophthalmic ointment   No No   Sig: Place a 1/2 inch ribbon of ointment into the lower eyelid    gabapentin (NEURONTIN) 300 mg capsule  Self No No   Sig: Take 1 capsule (300 mg total) by mouth 3 (three) times a day   Patient not taking: Reported on 11/6/2019   ibuprofen (MOTRIN) 600 mg tablet   No No   Sig: Take 1 tablet (600 mg total) by mouth every 6 (six) hours as needed for mild pain for up to 10 days   oxyCODONE-acetaminophen (PERCOCET) 5-325 mg per tablet   No No   Sig: Take 1 tablet by mouth every 4 (four) hours as needed for moderate pain for up to 8 dosesMax Daily Amount: 6 tablets      Facility-Administered Medications: None       History reviewed  No pertinent past medical history  Past Surgical History:   Procedure Laterality Date    ABCESS DRAINAGE  10/29/2018    carlos anal    ANAL FISTULOTOMY N/A 3/14/2019    Procedure: FISTULOTOMY;  Surgeon: Debrah Crigler, MD;  [facility];  Service: Colorectal    MA COLONOSCOPY FLX DX W/COLLJ SPEC WHEN PFRMD N/A 11/21/2018    Procedure: COLONOSCOPY;  Surgeon: Jesus Manuel Gonzales MD;  [facility]; Service: Gastroenterology    MA ESOPHAGOGASTRODUODENOSCOPY TRANSORAL DIAGNOSTIC N/A 11/21/2018    Procedure: ESOPHAGOGASTRODUODENOSCOPY (EGD); Surgeon: Jesus Manuel Gonzales MD;  [facility]; Service: Gastroenterology    MA ESOPHAGOGASTRODUODENOSCOPY TRANSORAL DIAGNOSTIC N/A 2/7/2019    Procedure: ESOPHAGOGASTRODUODENOSCOPY (EGD); Surgeon: Jesus Manuel Gonzales MD;  [facility]; Service: Gastroenterology    MA LAMNOTMY INCL W/DCMPRSN NRV ROOT 1 INTRSPC LUMBR Left 11/22/2019    Procedure: LAMINECTOMY LUMBAR/THORACIC; L4/5 microdiscectomy;  Surgeon: Maryuri Dallas MD;  Location:  MAIN OR;  Service: Neurosurgery    TONSILLECTOMY         Family History   Problem Relation Age of Onset    Diabetes Father      I have reviewed and agree with the history as documented      E-Cigarette/Vaping    E-Cigarette Use Never User      E-Cigarette/Vaping Substances    Nicotine No     THC No     CBD No     Flavoring No     Other No     Unknown No Social History     Tobacco Use    Smoking status: Current Every Day Smoker     Packs/day: 0 50    Smokeless tobacco: Never Used   Substance Use Topics    Alcohol use: Yes     Comment: very rarely    Drug use: Not Currently     Frequency: 2 0 times per week     Types: Marijuana     Comment: none recently       Review of Systems   Constitutional: Negative for activity change, appetite change, chills, diaphoresis, fatigue, fever and unexpected weight change  HENT: Negative for congestion, rhinorrhea, sinus pressure, sore throat and trouble swallowing  Eyes: Negative for photophobia and visual disturbance  Respiratory: Positive for shortness of breath  Negative for apnea, cough, choking, chest tightness, wheezing and stridor  Cardiovascular: Negative for chest pain, palpitations and leg swelling  Gastrointestinal: Positive for abdominal pain and diarrhea  Negative for abdominal distention, blood in stool, constipation, nausea and vomiting  Genitourinary: Negative for decreased urine volume, difficulty urinating, dysuria, enuresis, flank pain, frequency, hematuria and urgency  Musculoskeletal: Negative for arthralgias, myalgias, neck pain and neck stiffness  Skin: Negative for color change, pallor, rash and wound  Allergic/Immunologic: Negative  Neurological: Negative for dizziness, tremors, syncope, weakness, light-headedness, numbness and headaches  Hematological: Negative  Psychiatric/Behavioral: Negative  All other systems reviewed and are negative  Physical Exam  Physical Exam   Constitutional: He is oriented to person, place, and time  He appears well-developed and well-nourished  Non-toxic appearance  He does not have a sickly appearance  He does not appear ill  No distress  HENT:   Head: Normocephalic and atraumatic  Eyes: Pupils are equal, round, and reactive to light  EOM and lids are normal    Neck: Normal range of motion  Neck supple     Cardiovascular: Normal rate, regular rhythm, S1 normal, S2 normal, normal heart sounds, intact distal pulses and normal pulses  Exam reveals no gallop, no distant heart sounds, no friction rub and no decreased pulses  No murmur heard  Pulses:       Radial pulses are 2+ on the right side, and 2+ on the left side  Pulmonary/Chest: Effort normal and breath sounds normal  No accessory muscle usage  No apnea, no tachypnea and no bradypnea  No respiratory distress  He has no decreased breath sounds  He has no wheezes  He has no rhonchi  He has no rales  Abdominal: Soft  Normal appearance  He exhibits no distension  There is tenderness in the epigastric area  There is no rigidity, no rebound and no guarding  Musculoskeletal: Normal range of motion  He exhibits no edema, tenderness or deformity  Neurological: He is alert and oriented to person, place, and time  No cranial nerve deficit  GCS eye subscore is 4  GCS verbal subscore is 5  GCS motor subscore is 6  Skin: Skin is warm, dry and intact  No rash noted  He is not diaphoretic  No erythema  No pallor  Psychiatric: His speech is normal    Nursing note and vitals reviewed        Vital Signs  ED Triage Vitals   Temperature Pulse Respirations Blood Pressure SpO2   07/13/20 1718 07/13/20 1718 07/13/20 1718 07/13/20 1718 07/13/20 1718   (!) 97 4 °F (36 3 °C) 70 18 109/61 98 %      Temp Source Heart Rate Source Patient Position - Orthostatic VS BP Location FiO2 (%)   07/13/20 1718 07/13/20 1718 07/13/20 1930 07/13/20 1718 --   Oral Monitor Lying Right arm       Pain Score       --                  Vitals:    07/13/20 1718 07/13/20 1930   BP: 109/61 111/51   Pulse: 70 63   Patient Position - Orthostatic VS:  Lying         Visual Acuity      ED Medications  Medications   cephalexin (KEFLEX) capsule 500 mg (has no administration in time range)   iohexol (OMNIPAQUE) 350 MG/ML injection (MULTI-DOSE) 100 mL (100 mL Intravenous Given 7/13/20 1859)       Diagnostic Studies  Results Reviewed     Procedure Component Value Units Date/Time    No[facility] Coron[facility] [[id number]]  (Normal) Collected:  07/13/20 1824    Lab Status:  Final result Specimen:  Nares from Nose Updated:  07/13/20 1926     SARS-CoV-2 Negative    Narrative: The specimen collection materials, transport medium, and/or testing methodology utilized in the production of these test results have been proven to be reliable in a limited validation with an abbreviated program under the Emergency Utilization Authorization provided by the FDA  Testing reported as "Presumptive positive" will be confirmed with secondary testing with a reference laboratory to ensure result accuracy  Clinical caution and judgement should be used with the interpretation of these results with consideration of the clinical impression and other laboratory testing  Testing reported as "Positive" or "Negative" has been proven to be accurate according to standard laboratory validation requirements  All testing is performed with control materials showing appropriate reactivity at standard intervals        Comprehensive metabolic panel [[id number]] Collected:  07/13/20 1824    Lab Status:  Final result Specimen:  Blood from Arm, Right Updated:  07/13/20 1849     Sodium 142 mmol/L      Potassium 4 0 mmol/L      Chloride 107 mmol/L      CO2 27 mmol/L      ANION GAP 8 mmol/L      BUN 15 mg/dL      Creatinine 0 83 mg/dL      Glucose 91 mg/dL      Calcium 8 9 mg/dL      AST 20 U/L      ALT 26 U/L      Alkaline Phosphatase 72 U/L      Total Protein 7 2 g/dL      Albumin 4 1 g/dL      Total Bilirubin 0 40 mg/dL      eGFR 116 ml/min/1 73sq m     Narrative:       [place] guidelines for Chronic Kidney Disease (CKD):     Stage 1 with normal or high GFR (GFR > 90 mL/min/1 73 square meters)    Stage 2 Mild CKD (GFR = 60-89 mL/min/1 73 square meters)    Stage 3A Moderate CKD (GFR = 45-59 mL/min/1 73 square meters)    Stage 3B Moderate CKD (GFR = 30-44 mL/min/1 73 square meters)    Stage 4 Severe CKD (GFR = 15-29 mL/min/1 73 square meters)    Stage 5 End Stage CKD (GFR <15 mL/min/1 73 square meters)  Note: GFR calculation is accurate only with a steady state creatinine    Lipase [[id number]]  (Normal) Collected:  07/13/20 1824    Lab Status:  Final result Specimen:  Blood from Arm, Right Updated:  07/13/20 1849     Lipase 73 u/L     CBC and differential [[id number]] Collected:  07/13/20 1824    Lab Status:  Final result Specimen:  Blood from Arm, Right Updated:  07/13/20 1830     WBC 8 28 Thousand/uL      RBC 4 29 Million/uL      Hemoglobin 13 9 g/dL      Hematocrit 41 4 %      MCV 97 fL      MCH 32 4 pg      MCHC 33 6 g/dL      RDW 12 9 %      MPV 9 1 fL      Platelets 242 Thousands/uL      nRBC 0 /100 WBCs      Neutrophils Relative 48 %      Immat GRANS % 0 %      Lymphocytes Relative 37 %      Monocytes Relative 9 %      Eosinophils Relative 5 %      Basophils Relative 1 %      Neutrophils Absolute 4 03 Thousands/µL      Immature Grans Absolute 0 02 Thousand/uL      Lymphocytes Absolute 3 04 Thousands/µL      Monocytes Absolute 0 76 Thousand/µL      Eosinophils Absolute 0 39 Thousand/µL      Basophils Absolute 0 04 Thousands/µL                  CT abdomen pelvis with contrast   Final Result by Edis Nguyen MD (07/13 1924)      No acute findings  Workstation performed: [id number]                    Procedures  Procedures         ED Course       US AUDIT      Most Recent Value   Initial Alcohol Screen: US AUDIT-C    1  How often do you have a drink containing alcohol?  0 Filed at: 07/13/2020 1946   2  How many drinks containing alcohol do you have on a typical day you are drinking? 0 Filed at: 07/13/2020 1946   3a  Male UNDER 65: How often do you have five or more drinks on one occasion?   0 Filed at: 07/13/2020 1946   Audit-C Score  0 Filed at: 07/13/2020 1946                  MICHA/DAST-10      Most Recent Value   How many times in the past year have you    Used an illegal drug or used a prescription medication for non-medical reasons? Never Filed at: 07/13/2020 1946                                [facility]  Number of Diagnoses or Management Options  Abdominal wall cellulitis: new and requires workup  Diagnosis management comments: DDx including but not limited to: food poisoning, viral illness, colitis, enteritis, metabolic abnormality, IBS, IBD, ileus, bowel obstruction, appendicitis, pancreatitis, hepatitis, mesenteric adenitis, mesenteric ischemia, toxic megacolon, cholecystitis, biliary colic, pyelonephritis, UTI, renal colic  Amount and/or Complexity of Data Reviewed  Clinical lab tests: ordered and reviewed  Tests in the radiology section of CPT®: reviewed and ordered  Independent visualization of images, tracings, or specimens: yes    Risk of Complications, Morbidity, and/or Mortality  Presenting problems: moderate  Management options: low  General comments: 37 yo male patient with periumbilical pain  Rash consistent with cellulitis  Remainder of workup unremarkable  Negative CT  Recommended keflex for rash  Follow up pcp  Return parameters provided  Pt understands and agrees with plan  Patient Progress  Patient progress: stable        Disposition  Final diagnoses:   Abdominal wall cellulitis     Time reflects when diagnosis was documented in both MDM as applicable and the Disposition within this note     Time User Action Codes Description Comment    7/13/2020  7:59 PM Ceci Michaels Add [N63 197] Abdominal wall cellulitis       ED Disposition     ED Disposition Condition Date/Time Comment    Discharge Stable Mon Jul 13, 2020  7:59 PM Lynette Samuels discharge to home/self care              Follow-up Information     Follow up With Specialties Details Why [address], DO Internal Medicine Call   [address] [facility] [address] [phone]            Patient's Medications Discharge Prescriptions    CEPHALEXIN (KEFLEX) 500 MG CAPSULE    Take 1 capsule (500 mg total) by mouth 4 (four) times a day for 7 days       Start Date: 7/13/2020 End Date: 7/20/2020       Order Dose: 500 mg       Quantity: 28 capsule    Refills: 0     No discharge procedures on file      PDMP Review     None          ED Provider  Electronically Signed by           Courtney Albert PA-C  07/13/20 2001

## 2020-07-14 ENCOUNTER — HOSPITAL ENCOUNTER (EMERGENCY)
Facility: HOSPITAL | Age: 35
Discharge: HOME | End: 2020-07-14
Attending: EMERGENCY MEDICINE | Admitting: EMERGENCY MEDICINE
Payer: COMMERCIAL

## 2020-07-14 VITALS
OXYGEN SATURATION: 97 % | TEMPERATURE: 97.8 F | SYSTOLIC BLOOD PRESSURE: 129 MMHG | WEIGHT: 195.55 LBS | BODY MASS INDEX: 27.38 KG/M2 | RESPIRATION RATE: 20 BRPM | HEART RATE: 78 BPM | HEIGHT: 71 IN | DIASTOLIC BLOOD PRESSURE: 58 MMHG

## 2020-07-14 DIAGNOSIS — S05.01XA CORNEAL ABRASION, RIGHT: Primary | ICD-10-CM

## 2020-07-14 PROCEDURE — 99284 EMERGENCY DEPT VISIT MOD MDM: CPT | Performed by: PHYSICIAN ASSISTANT

## 2020-07-14 PROCEDURE — 99283 EMERGENCY DEPT VISIT LOW MDM: CPT

## 2020-07-14 RX ORDER — TETRACAINE HYDROCHLORIDE 5 MG/ML
2 SOLUTION OPHTHALMIC ONCE
Status: COMPLETED | OUTPATIENT
Start: 2020-07-14 | End: 2020-07-14

## 2020-07-14 RX ORDER — ERYTHROMYCIN 5 MG/G
OINTMENT OPHTHALMIC
Qty: 3.5 G | Refills: 0 | Status: SHIPPED | OUTPATIENT
Start: 2020-07-14 | End: 2021-01-05

## 2020-07-14 RX ADMIN — TETRACAINE HYDROCHLORIDE 2 DROP: 5 SOLUTION OPHTHALMIC at 07:22

## 2020-07-14 RX ADMIN — FLUORESCEIN SODIUM 1 STRIP: 1 STRIP OPHTHALMIC at 07:22

## 2020-07-14 NOTE — ED PROVIDER NOTES
History  Chief Complaint   Patient presents with    Eye Pain     Patient c/o right eye pain that he states started last night  Denies any acute injury  This is a 52-year-old male patient woke up this morning and found that he had photophobia and right eye irritation with tearing  He does not recall an injury  However he states approximately 1 month ago he did injuries I with a scratch and since then he has had on and off discomfort  He does not wear contacts or glasses  States that his right eye is blurry however patient continues to tear and states he can open his eye fully due to photophobia so it is hard to see  No headache no fever no chills no purulent discharge  Also states that works in 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 Taking?    albuterol (PROVENTIL HFA,VENTOLIN HFA) 90 mcg/act inhaler   No No   Sig: Inhale 2 puffs every 4 (four) hours as needed for wheezing   amoxicillin (AMOXIL) 500 mg capsule  Self Yes No   Sig: For an upcoming dental procedure   cephalexin (Keflex) 500 mg capsule   No No   Sig: Take 1 capsule (500 mg total) by mouth 4 (four) times a day for 7 days   erythromycin (ILOTYCIN) ophthalmic ointment   No No   Sig: Place a 1/2 inch ribbon of ointment into the lower eyelid    gabapentin (NEURONTIN) 300 mg capsule  Self No No   Sig: Take 1 capsule (300 mg total) by mouth 3 (three) times a day   Patient not taking: Reported on 11/6/2019   ibuprofen (MOTRIN) 600 mg tablet   No No   Sig: Take 1 tablet (600 mg total) by mouth every 6 (six) hours as needed for mild pain for up to 10 days   oxyCODONE-acetaminophen (PERCOCET) 5-325 mg per tablet   No No   Sig: Take 1 tablet by mouth every 4 (four) hours as needed for moderate pain for up to 8 dosesMax Daily Amount: 6 tablets      Facility-Administered Medications: None       History reviewed  No pertinent past medical history  Past Surgical History:   Procedure Laterality Date    ABCESS DRAINAGE  10/29/2018    carlos anal    ANAL FISTULOTOMY N/A 3/14/2019    Procedure: FISTULOTOMY;  Surgeon: Lani Guzman MD;  [facility];  Service: Colorectal    MO COLONOSCOPY FLX DX W/COLLJ SPEC WHEN PFRMD N/A 11/21/2018    Procedure: COLONOSCOPY;  Surgeon: Iavn Mock MD;  [facility]; Service: Gastroenterology    MO ESOPHAGOGASTRODUODENOSCOPY TRANSORAL DIAGNOSTIC N/A 11/21/2018    Procedure: ESOPHAGOGASTRODUODENOSCOPY (EGD); Surgeon: Ivan Mock MD;  [facility]; Service: Gastroenterology    MO ESOPHAGOGASTRODUODENOSCOPY TRANSORAL DIAGNOSTIC N/A 2/7/2019    Procedure: ESOPHAGOGASTRODUODENOSCOPY (EGD); Surgeon: Ivan Mock MD;  [facility]; Service: Gastroenterology    MO LAMNOTMY INCL W/DCMPRSN NRV ROOT 1 INTRSPC LUMBR Left 11/22/2019    Procedure: LAMINECTOMY LUMBAR/THORACIC; L4/5 microdiscectomy;  Surgeon: Bob Martin MD;  Location:  MAIN OR;  Service: Neurosurgery    TONSILLECTOMY         Family History   Problem Relation Age of Onset    Diabetes Father      I have reviewed and agree with the history as documented      E-Cigarette/Vaping    E-Cigarette Use Never User      E-Cigarette/Vaping Substances    Nicotine No     THC No     CBD No     Flavoring No     Other No     Unknown No      Social History     Tobacco Use    Smoking status: Current Every Day Smoker     Packs/day: 0 50    Smokeless tobacco: Never Used   Substance Use Topics    Alcohol use: Yes     Comment: very rarely    Drug use: Not Currently     Frequency: 2 0 times per week     Types: Marijuana     Comment: none recently       Review of Systems   Constitutional: Negative for diaphoresis, fatigue and fever  HENT: Negative for congestion, ear pain, nosebleeds and sore throat  Eyes: Positive for photophobia, pain and redness  Negative for discharge, itching and visual disturbance  Respiratory: Negative for cough, choking, chest tightness, shortness of breath and wheezing  Cardiovascular: Negative for chest pain and palpitations  Gastrointestinal: Negative for abdominal distention, abdominal pain, diarrhea and vomiting  Genitourinary: Negative for dysuria, flank pain and frequency  Musculoskeletal: Negative for back pain, gait problem and joint swelling  Skin: Negative for color change and rash  Neurological: Negative for dizziness, syncope and headaches  Psychiatric/Behavioral: Negative for behavioral problems and confusion  The patient is not nervous/anxious  All other systems reviewed and are negative  Physical Exam  Physical Exam   Constitutional: He appears well-developed and well-nourished  HENT:   Head: Normocephalic and atraumatic  Right Ear: External ear normal    Left Ear: External ear normal    Nose: Nose normal    Mouth/Throat: Oropharynx is clear and moist    Eyes: Pupils are equal, round, and reactive to light  EOM are normal  Lids are everted and swept, no foreign bodies found  Right eye exhibits no chemosis, no discharge, no exudate and no hordeolum  No foreign body present in the right eye  Right conjunctiva is injected  No scleral icterus  Fundoscopic exam:       The right eye shows no exudate  The right eye shows red reflex  The left eye shows no exudate  The left eye shows red reflex  Slit lamp exam:       The right eye shows corneal abrasion and fluorescein uptake  The right eye shows no corneal flare, no corneal ulcer, no foreign body, no hyphema and no hypopyon  The left eye shows no corneal abrasion  Neck: Normal range of motion  Neck supple  Cardiovascular: Normal rate and regular rhythm     Pulmonary/Chest: Effort normal and breath sounds normal    Abdominal: Soft  Bowel sounds are normal  There is no tenderness  Neurological: He is alert  Skin: Skin is warm  Psychiatric: He has a normal mood and affect  His behavior is normal    Nursing note and vitals reviewed  Vital Signs  ED Triage Vitals [07/14/20 0700]   Temperature Pulse Respirations Blood Pressure SpO2   97 8 °F (36 6 °C) 78 20 129/58 97 %      Temp Source Heart Rate Source Patient Position - Orthostatic VS BP Location FiO2 (%)   Oral Monitor -- -- --      Pain Score       9           Vitals:    07/14/20 0700   BP: 129/58   Pulse: 78         Visual Acuity  Visual Acuity      Most Recent Value   Visual acuity R eye is  20/25   Visual acuity Left eye is  20/20   Visual acuity in both eyes is  20/20   Wearing corrective eyewear/lenses? No   No corrective eyewear/lenses  Yes   L Pupil Shape  Round   R Pupil Shape  Round          ED Medications  Medications   fluorescein sodium sterile ophthalmic strip 1 strip (1 strip Both Eyes Given 7/14/20 0722)   tetracaine 0 5 % ophthalmic solution 2 drop (2 drops Both Eyes Given 7/14/20 0088)       Diagnostic Studies  Results Reviewed     None                 No orders to display              Procedures  Procedures         ED Course       US AUDIT      Most Recent Value   Initial Alcohol Screen: US AUDIT-C    1  How often do you have a drink containing alcohol?  0 Filed at: 07/14/2020 0701   2  How many drinks containing alcohol do you have on a typical day you are drinking? 0 Filed at: 07/14/2020 0701   3a  Male UNDER 65: How often do you have five or more drinks on one occasion? 0 Filed at: 07/14/2020 0701   3b  FEMALE Any Age, or MALE 65+: How often do you have 4 or more drinks on one occassion? 0 Filed at: 07/14/2020 0701   Audit-C Score  0 Filed at: 07/14/2020 0701                  MICHA/DAST-10      Most Recent Value   How many times in the past year have you       Used an illegal drug or used a prescription medication for non-medical reasons? Never Filed at: 07/14/2020 0701                                MDM      Disposition  Final diagnoses:   Corneal abrasion, right     Time reflects when diagnosis was documented in both MDM as applicable and the Disposition within this note     Time User Action Codes Description Comment    7/14/2020  7:27 AM Db Puri Add [S05 01XA] Corneal abrasion, right       ED Disposition     ED Disposition Condition Date/Time Comment    Discharge Stable Tu Jul 14, 2020  7:27 AM Sunita Ye discharge to home/self care  Follow-up Information     Follow up With Specialties Details Why [address] Ophthalmology Call today  [address] [place] [id number] [place] [address]  [phone]            Patient's Medications   Discharge Prescriptions    ERYTHROMYCIN (ILOTYCIN) OPHTHALMIC OINTMENT    Half ribbon to bottom lid right eye q i d  For 7 days       Start Date: 7/14/2020 End Date: --       Order Dose: --       Quantity: 3 5 g    Refills: 0     No discharge procedures on file      PDMP Review     None          ED Provider  Electronically Signed by           Author REAB Guerrero  07/14/20 0760

## 2020-09-15 NOTE — TELEPHONE ENCOUNTER
Date of Service: 02/26/2020    SUBJECTIVE:  A 79-year-old male presents to [facility] care.  Previous physician was Dr. Black.  His medication list is reviewed and updated as noted.  He is compliant with his medications and denies any side effects.  His main diagnoses include type 2 diabetes, hypertension, B12 deficiency, chronic ischemic heart disease and prostate cancer.  He is followed by Dr. Ellington for the prostate cancer and Dr. Cabello from a cardiology standpoint.  He has appointments with both of them later this spring.  His last A1c was 6.6 in 08/2019.  He checks his fasting sugars, and they are generally 117-159.  He eats 2-3 meals per day, does some mild walking.  He denies any chest pain, dyspnea, palpitations or lower extremity edema.    OBJECTIVE:  VITAL SIGNS:  As noted.  GENERAL:  The patient is alert, appropriate, oriented x3.  NECK:  Supple, no adenopathy, no thyromegaly.  LUNGS:  Clear.  HEART:  S1, S2, regular rate, no murmur, no gallop.  EXTREMITIES:  Lower extremities without edema.    ASSESSMENT:  Stable type 2 diabetes, hypertension, B12 deficiency, chronic ischemic heart disease and prostate cancer.    PLAN:  Continue current medications.  Check A1c.  Follow with results.  Would plan follow up in 6 months for diabetes recheck and Medicare wellness visit.      Dictated By: Dhara Gallagher MD  Signing Provider: Dhara Gallagher MD    EL/ps ([id number])  DD: 09/14/2020 11:51:08 TD: 09/15/2020 04:29:01    Copy Sent To:    Sanchezm to call back

## 2020-11-19 ENCOUNTER — TELEPHONE (OUTPATIENT)
Dept: INTERNAL MEDICINE CLINIC | Facility: CLINIC | Age: 35
End: 2020-11-19

## 2020-11-20 ENCOUNTER — TELEMEDICINE (OUTPATIENT)
Dept: INTERNAL MEDICINE CLINIC | Facility: CLINIC | Age: 35
End: 2020-11-20
Payer: COMMERCIAL

## 2020-11-20 ENCOUNTER — TELEPHONE (OUTPATIENT)
Dept: INTERNAL MEDICINE CLINIC | Facility: CLINIC | Age: 35
End: 2020-11-20

## 2020-11-20 DIAGNOSIS — Z20.822 EXPOSURE TO COVID-19 VIRUS: ICD-10-CM

## 2020-11-20 DIAGNOSIS — Z20.822 EXPOSURE TO COVID-19 VIRUS: Primary | ICD-10-CM

## 2020-11-20 PROCEDURE — 99213 OFFICE O/P EST LOW 20 MIN: CPT | Performed by: INTERNAL MEDICINE

## 2020-11-20 PROCEDURE — 3725F SCREEN DEPRESSION PERFORMED: CPT | Performed by: INTERNAL MEDICINE

## 2020-11-20 PROCEDURE — U0003 INFECTIOUS AGENT DETECTION BY NUCLEIC ACID (DNA OR RNA); SEVERE ACUTE RESPIRATORY SYNDROME CORONAVIRUS 2 (SARS-COV-2) (CORONAVIRUS DISEASE [COVID-19]), AMPLIFIED PROBE TECHNIQUE, MAKING USE OF HIGH THROUGHPUT TECHNOLOGIES AS DESCRIBED BY CMS-2020-01-R: HCPCS | Performed by: INTERNAL MEDICINE

## 2020-11-20 PROCEDURE — 4004F PT TOBACCO SCREEN RCVD TLK: CPT | Performed by: INTERNAL MEDICINE

## 2020-11-22 LAB — SARS-COV-2 RNA SPEC QL NAA+PROBE: NOT DETECTED

## 2021-01-05 ENCOUNTER — TELEPHONE (OUTPATIENT)
Dept: INTERNAL MEDICINE CLINIC | Facility: CLINIC | Age: 36
End: 2021-01-05

## 2021-01-05 ENCOUNTER — TELEMEDICINE (OUTPATIENT)
Dept: INTERNAL MEDICINE CLINIC | Facility: CLINIC | Age: 36
End: 2021-01-05
Payer: COMMERCIAL

## 2021-01-05 DIAGNOSIS — J02.0 STREP THROAT: Primary | ICD-10-CM

## 2021-01-05 PROBLEM — R51.9 HEADACHE: Status: RESOLVED | Noted: 2019-11-06 | Resolved: 2021-01-05

## 2021-01-05 PROBLEM — Z87.19 HISTORY OF RECTAL ABSCESS: Chronic | Status: ACTIVE | Noted: 2018-11-13

## 2021-01-05 PROBLEM — R53.83 OTHER FATIGUE: Status: RESOLVED | Noted: 2018-11-13 | Resolved: 2021-01-05

## 2021-01-05 PROBLEM — K61.1 RECTAL ABSCESS: Status: RESOLVED | Noted: 2018-11-13 | Resolved: 2021-01-05

## 2021-01-05 PROBLEM — K61.1 PERIRECTAL ABSCESS: Status: RESOLVED | Noted: 2018-11-13 | Resolved: 2021-01-05

## 2021-01-05 PROBLEM — R13.13 PHARYNGEAL DYSPHAGIA: Status: RESOLVED | Noted: 2018-11-13 | Resolved: 2021-01-05

## 2021-01-05 PROBLEM — R11.2 INTRACTABLE VOMITING WITH NAUSEA: Status: RESOLVED | Noted: 2019-02-06 | Resolved: 2021-01-05

## 2021-01-05 PROBLEM — K61.0 ANAL ABSCESS: Status: RESOLVED | Noted: 2019-03-12 | Resolved: 2021-01-05

## 2021-01-05 PROBLEM — K60.30 ANAL FISTULA: Status: RESOLVED | Noted: 2019-02-06 | Resolved: 2021-01-05

## 2021-01-05 PROBLEM — R11.2 NAUSEA AND VOMITING: Status: RESOLVED | Noted: 2018-11-13 | Resolved: 2021-01-05

## 2021-01-05 PROBLEM — K61.1 PERI-RECTAL ABSCESS: Status: RESOLVED | Noted: 2018-11-13 | Resolved: 2021-01-05

## 2021-01-05 PROBLEM — K62.89 RECTAL PAIN: Status: RESOLVED | Noted: 2018-11-13 | Resolved: 2021-01-05

## 2021-01-05 PROBLEM — R11.0 NAUSEA: Status: RESOLVED | Noted: 2018-11-13 | Resolved: 2021-01-05

## 2021-01-05 PROBLEM — K60.3 ANAL FISTULA: Status: RESOLVED | Noted: 2019-02-06 | Resolved: 2021-01-05

## 2021-01-05 PROCEDURE — 4004F PT TOBACCO SCREEN RCVD TLK: CPT | Performed by: INTERNAL MEDICINE

## 2021-01-05 PROCEDURE — 99214 OFFICE O/P EST MOD 30 MIN: CPT | Performed by: INTERNAL MEDICINE

## 2021-01-05 RX ORDER — AMOXICILLIN 500 MG/1
500 TABLET, FILM COATED ORAL 3 TIMES DAILY
Qty: 21 TABLET | Refills: 0 | Status: SHIPPED | OUTPATIENT
Start: 2021-01-05 | End: 2021-01-12

## 2021-01-05 NOTE — PROGRESS NOTES
Virtual Regular Visit    Assessment/Plan:    Problem List Items Addressed This Visit     None      Visit Diagnoses     Strep throat    -  Primary    Relevant Medications    amoxicillin (AMOXIL) 500 MG tablet        From history provided, strep throat is most likely diagnosis  Will send in antibiotics to his pharmacy  Discussed hydration and NSAID use  Reason for visit is   Chief Complaint   Patient presents with    Virtual Regular Visit        Encounter provider Lee Ann Blanco DO    Provider located at [address] [place] [address]      Recent Visits  No visits were found meeting these conditions  Showing recent visits within past 7 days and meeting all other requirements     Today's Visits  Date Type Provider Dept   01/05/21 Telephone [facility] today's visits and meeting all other requirements     Future Appointments  No visits were found meeting these conditions  Showing future appointments within next 150 days and meeting all other requirements        The patient was identified by name and date of birth  Ronni Powers was informed that this is a telemedicine visit and that the visit is being conducted through [employer] and patient was informed that this is not a secure, HIPAA-compliant platform  He agrees to proceed     My office door was closed  No one else was in the room  He acknowledged consent and understanding of privacy and security of the video platform  The patient has agreed to participate and understands they can discontinue the visit at any time  Patient is aware this is a billable service  Subjective  Ronni Powers is a 30 y o  male who started not feeling well on Saturday  States that his wife and kids are on the way home for their doctor and everyone tested positive for strep  No exposure to COVID that he knows of   He notes subjective fevers, chills, sore throat, painful swallowing, headaches, and fatigue  He denies cough or shortness of breath  No loss of smell or taste  He did not check his temperature at home  Symptoms are getting worse  Unable to visualize back of his throat on video  Past Medical History:   Diagnosis Date    Anal abscess 3/12/2019    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       Past Surgical History:   Procedure Laterality Date    ABCESS DRAINAGE  10/29/2018    carlos anal    ANAL FISTULOTOMY N/A 3/14/2019    Procedure: FISTULOTOMY;  Surgeon: Lilo Marks MD;  [facility];  Service: Colorectal    IN COLONOSCOPY FLX DX W/COLLJ SPEC WHEN PFRMD N/A 11/21/2018    Procedure: COLONOSCOPY;  Surgeon: Rachele Truong MD;  [facility]; Service: Gastroenterology    IN ESOPHAGOGASTRODUODENOSCOPY TRANSORAL DIAGNOSTIC N/A 11/21/2018    Procedure: ESOPHAGOGASTRODUODENOSCOPY (EGD); Surgeon: Racheel Truong MD;  [facility]; Service: Gastroenterology    IN ESOPHAGOGASTRODUODENOSCOPY TRANSORAL DIAGNOSTIC N/A 2/7/2019    Procedure: ESOPHAGOGASTRODUODENOSCOPY (EGD); Surgeon: Rachele Truong MD;  [facility]; Service: Gastroenterology    IN LAMNOTMY INCL W/DCMPRSN NRV ROOT 1 INTRSPC LUMBR Left 11/22/2019    Procedure: LAMINECTOMY LUMBAR/THORACIC; L4/5 microdiscectomy;  Surgeon: Yvonne Walker MD;  Location:  MAIN OR;  Service: Neurosurgery    TONSILLECTOMY         Current Outpatient Medications   Medication Sig Dispense Refill    amoxicillin (AMOXIL) 500 MG tablet Take 1 tablet (500 mg total) by mouth 3 (three) times a day for 7 days 21 tablet 0     No current facility-administered medications for this visit  No Known Allergies    Review of Systems   Constitutional: Positive for activity change, chills, fatigue and fever  HENT: Positive for sore throat and trouble swallowing  Negative for congestion  Respiratory: Negative for cough, shortness of breath and wheezing  Cardiovascular: Negative  Gastrointestinal: Negative  Neurological: Positive for headaches  Negative for weakness  Video Exam    Physical Exam  Constitutional:       General: He is not in acute distress  Appearance: He is well-developed  He is not diaphoretic  HENT:      Mouth/Throat:      Comments: Could not appropriately visualize his oropharynx on video  Eyes:      General: No scleral icterus  Right eye: No discharge  Left eye: No discharge  Conjunctiva/sclera: Conjunctivae normal    Pulmonary:      Effort: Pulmonary effort is normal  No respiratory distress  Neurological:      Mental Status: He is alert and oriented to person, place, and time  Psychiatric:         Behavior: Behavior normal         I spent 10 minutes directly with the patient during this visit      VIRTUAL VISIT DISCLAIMER    Awilda Bermudez acknowledges that he has consented to an online visit or consultation  He understands that the online visit is based solely on information provided by him, and that, in the absence of a face-to-face physical evaluation by the physician, the diagnosis he receives is both limited and provisional in terms of accuracy and completeness  This is not intended to replace a full medical face-to-face evaluation by the physician  Awilda Bermudez understands and accepts these terms

## 2021-01-12 ENCOUNTER — TELEMEDICINE (OUTPATIENT)
Dept: INTERNAL MEDICINE CLINIC | Facility: CLINIC | Age: 36
End: 2021-01-12
Payer: COMMERCIAL

## 2021-01-12 ENCOUNTER — TELEPHONE (OUTPATIENT)
Dept: INTERNAL MEDICINE CLINIC | Facility: CLINIC | Age: 36
End: 2021-01-12

## 2021-01-12 DIAGNOSIS — B34.9 VIRAL INFECTION, UNSPECIFIED: Primary | ICD-10-CM

## 2021-01-12 PROCEDURE — 87637 SARSCOV2&INF A&B&RSV AMP PRB: CPT | Performed by: NURSE PRACTITIONER

## 2021-01-12 PROCEDURE — G2012 BRIEF CHECK IN BY MD/QHP: HCPCS | Performed by: NURSE PRACTITIONER

## 2021-01-12 NOTE — PROGRESS NOTES
COVID-19 Virtual Visit     Assessment/Plan:  Patient will come to office for covid testing  Advised to try mucinex for mucous production and cough  Will also test for flu at this time, since he was positive for Flu B last year around this time  Problem List Items Addressed This Visit     None      Visit Diagnoses     Viral infection, unspecified    -  Primary    Relevant Orders    Novel Coronavirus 2019(COVID-19), Influenza A/B, RSV [facility] Collected in Office         Disposition:     I recommended self-quarantine for 10 days and to watch for symptoms until 14 days after exposure  If patient were to develop symptoms, they should self isolate and call our office for further guidance  I recommended the patient to come to our office to perform PCR testing for COVID-19  I have spent 10 minutes directly with the patient  Greater than 50% of this time was spent in counseling/coordination of care regarding: instructions for management, patient and family education, importance of treatment compliance, risk factor reductions and impressions  Encounter provider GORDY Knox    Provider located at [address] [place] [address]    Recent Visits  Date Type Provider Dept   01/05/21 Telephone Kayden Foley 47   01/05/21 Telemedicine [facility], [address]  recent visits within past 7 days and meeting all other requirements     Today's Visits  Date Type Provider Dept   01/12/21 Talha 64, Aimee 51   01/12/21 Telephone [facility] today's visits and meeting all other requirements     Future Appointments  No visits were found meeting these conditions     Showing future appointments within next 150 days and meeting all other requirements        Patient agrees to participate in a virtual check in via telephone or video visit instead of presenting to the office to address urgent/immediate medical needs  Patient is aware this is a billable service  After connecting through Telephone, the patient was identified by name and date of birth  Enrrique Romano was informed that this was a telemedicine visit and that the exam was being conducted confidentially over secure lines  My office door was closed  No one else was in the room  Enrrique Romano acknowledged consent and understanding of privacy and security of the telemedicine visit  I informed the patient that I have reviewed his record in Epic and presented the opportunity for him to ask any questions regarding the visit today  The patient agreed to participate  It was my intent to perform this visit via video technology but the patient was not able to do a video connection so the visit was completed via audio telephone only  Subjective:   Enrrique Romano is a 30 y o  male who is concerned about COVID-19  Patient's symptoms include chills, nasal congestion, rhinorrhea, sore throat and cough  Patient denies fever, fatigue, malaise, anosmia, loss of taste, shortness of breath, chest tightness, abdominal pain, nausea, vomiting, diarrhea, myalgias and headaches       Date of symptom onset: 1/5/2021  Date of exposure: 1/7/2021    Exposure:   Contact with a person who is under investigation (PUI) for or who is positive for COVID-19 within the last 14 days?: No    Hospitalized recently for fever and/or lower respiratory symptoms?: No      Currently a healthcare worker that is involved in direct patient care?: No      Works in a special setting where the risk of COVID-19 transmission may be high? (this may include long-term care, correctional and penitentiary facilities; homeless shelters; assisted-living facilities and group homes ): No      Resident in a special setting where the risk of COVID-19 transmission may be high? (this may include long-term care, correctional and residential facilities; homeless shelters; assisted-living facilities and group homes ): No      Patient states that he had contact with a person, who's family member had tested positive for covid  Patient was also seen last week for virtual visit for possible strep throat, after the rest of his family had been diagnosed with strep  He was given amoxicillin  States he did miss a few days  However continues to have sore throat, cough, sweats and chills  Would like covid testing to be sure it is not covid related  He did have the flu march of last year and states he kind of feels similar to that right now  Lab Results   Component Value Date    SARSCOV2 Not Detected 11/20/2020     Past Medical History:   Diagnosis Date    Anal abscess 3/12/2019    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     Past Surgical History:   Procedure Laterality Date    ABCESS DRAINAGE  10/29/2018    carlos anal    ANAL FISTULOTOMY N/A 3/14/2019    Procedure: FISTULOTOMY;  Surgeon: Velia Vazquez MD;  [facility];  Service: Colorectal    [place] COLONOSCOPY FLX DX W/COLLJ SPEC WHEN PFRMD N/A 11/21/2018    Procedure: COLONOSCOPY;  Surgeon: Carol Infante MD;  [facility]; Service: Gastroenterology    UT ESOPHAGOGASTRODUODENOSCOPY TRANSORAL DIAGNOSTIC N/A 11/21/2018    Procedure: ESOPHAGOGASTRODUODENOSCOPY (EGD); Surgeon: Carol Infante MD;  [facility]; Service: Gastroenterology    UT ESOPHAGOGASTRODUODENOSCOPY TRANSORAL DIAGNOSTIC N/A 2/7/2019    Procedure: ESOPHAGOGASTRODUODENOSCOPY (EGD); Surgeon: Carol Infante MD;  [facility];   Service: Gastroenterology   Alexus Lugo LAMNOTMY INCL W/DCMPRSN NRV ROOT 1 INTRSPC LUMBR Left 11/22/2019    Procedure: LAMINECTOMY LUMBAR/THORACIC; L4/5 microdiscectomy;  Surgeon: Ava Senior MD;  Location:  MAIN OR;  Service: Neurosurgery    TONSILLECTOMY       Current Outpatient Medications   Medication Sig Dispense Refill    amoxicillin (AMOXIL) 500 MG tablet Take 1 tablet (500 mg total) by mouth 3 (three) times a day for 7 days 21 tablet 0     No current facility-administered medications for this visit  No Known Allergies    Review of Systems   Constitutional: Positive for chills  Negative for fatigue and fever  HENT: Positive for congestion, rhinorrhea and sore throat  Respiratory: Positive for cough  Negative for chest tightness and shortness of breath  Gastrointestinal: Negative for abdominal pain, diarrhea, nausea and vomiting  Musculoskeletal: Negative for myalgias  Neurological: Negative for headaches  Objective: There were no vitals filed for this visit  Physical Exam  Pulmonary:      Effort: No tachypnea or respiratory distress  Neurological:      Mental Status: He is alert and oriented to person, place, and time  Psychiatric:         Attention and Perception: Attention normal          Mood and Affect: Mood normal          Speech: Speech normal          Behavior: Behavior normal  Behavior is cooperative  Thought Content: Thought content normal        VIRTUAL VISIT DISCLAIMER    Efren Jelena acknowledges that he has consented to an online visit or consultation  He understands that the online visit is based solely on information provided by him, and that, in the absence of a face-to-face physical evaluation by the physician, the diagnosis he receives is both limited and provisional in terms of accuracy and completeness  This is not intended to replace a full medical face-to-face evaluation by the physician  Efren Bowles understands and accepts these terms

## 2021-01-12 NOTE — PATIENT INSTRUCTIONS
COVID-19 (Coronavirus Disease 2019)   AMBULATORY CARE:   Coronavirus disease 2019 (COVID-19)  is the disease caused by the novel (new) coronavirus first discovered in December 2019  Coronaviruses generally cause upper respiratory (nose, throat, and lung) infections, such as a cold  The new virus can also cause serious lower respiratory conditions, such as pneumonia or acute respiratory distress syndrome (ARDS)  Anyone can develop serious problems from the new virus, but your risk is higher if you are 65 or older  A weak immune system, diabetes, or a heart or lung condition can also increase your risk  Signs and symptoms: You may not develop any signs or symptoms  Signs and symptoms that do develop usually start about 5 days after infection but can take 2 to 14 days  Signs and symptoms range from mild to severe  You may feel like you have the flu or a bad cold  Information on COVID-19 is still being learned  Tell your healthcare provider if you think you were infected but develop signs or symptoms not listed below:  · A cough    · Shortness of breath or trouble breathing that may become severe    · A fever of at least 100 4°F, or 38°C (may be lower in adults 65 or older)    · Chills that might include shaking    · Muscle pain, body aches, or a headache    · A sore throat    · Suddenly not being able to taste or smell anything    · Feeling mentally and physically tired (fatigue)    · Congestion (stuffy head and nose), or a runny nose    · Diarrhea, nausea, or vomiting    If you think you or someone you know may be infected:  Do the following to protect others:  · If emergency care is needed,  tell the  about the possible infection, or call ahead and tell the emergency department  · Call a healthcare provider  for instructions if symptoms are mild  Anyone who may be infected should not  arrive without calling first  The provider will need to protect staff members and other patients      · The person who may be infected needs to wear a face covering  while getting medical care  This will help lower the risk of infecting others  Coverings are not used for anyone who is younger than 2 years, has breathing problems, or cannot remove it  The provider can give you instructions for anyone who cannot wear a covering  Call your local emergency number (911 in the [address]) or go to the emergency department if:   · You have trouble breathing or shortness of breath at rest     · You have chest pain or pressure that lasts longer than 5 minutes  · You become confused or hard to wake  · Your lips or face are blue  · You have a fever of 104°F (40°C) or higher  Call your doctor if:   · You do not  have symptoms of COVID-19 but had close physical contact within 14 days with someone who tested positive  · You have questions or concerns about your condition or care  How COVID-19 is diagnosed: If you think you have COVID-19, call your healthcare provider  In some areas, testing is only done if a person has severe symptoms or is hospitalized  Testing is done more widely in other places  Your provider will tell you what to do based on your symptoms and the rules in your area  In general, the following may be used:  · A viral test  shows if you have a current infection  Samples are taken from your nose and throat, usually with swabs  You may need to wait several days to get the test results  Your healthcare provider will tell you how to get your results  You will need to quarantine (stay physically away from others) until you get your results  If results show you have COVID-19, you will need to quarantine until you are well  Your provider or other health official may give you more directions  You will also need to prevent another infection until it is known if you can get COVID-19 again  · An antibody test  shows if you had a past infection   Blood samples are used for this test  Antibodies are made by your immune system to attack the virus that causes COVID-19  Antibodies will form 1 to 3 weeks after you are infected  It is not known if antibodies prevent a second infection, or for how long a person might be protected  If you have antibodies, you will still need to be careful around others until more is known  · CT scans or x-rays  may be used to check for signs of pneumonia  The 2019 coronavirus causes a specific kind of pneumonia, usually in both lungs  Treatment:  No medicine or specific treatment is currently approved for COVID-19  The following may be used to manage your symptoms or treat the effects of COVID-19:  · Mild symptoms  may get better on their own  If you do not need to be treated in a hospital, you will be given instructions to use at home  Your condition will be closely monitored  You will need to watch for worsening symptoms and seek immediate care if needed  Talk to your healthcare provider about the following:    ? Relieve your symptoms  To soothe a sore throat, gargle with warm salt water, or use throat lozenges or a throat spray  Your healthcare provider may recommend a cough medicine  Drink more liquids to thin and loosen mucus and to prevent dehydration  Use decongestants or saline drops as directed for nasal congestion  ? NSAIDs or acetaminophen  can help lower a fever and relieve body aches or a headache  Follow directions  If not taken correctly, NSAIDs can cause kidney damage and acetaminophen can cause liver damage  · Severe or life-threatening symptoms  are treated in the hospital  You may need a combination of the following:    ? Medicines  may be given to reduce inflammation or to fight the virus  You may also need blood thinners to prevent or treat blood clots  If you have a deep vein thrombosis (DVT) or pulmonary embolism (PE), you may need to keep using blood thinners for 3 months  ? Extra oxygen  may be given if you have respiratory failure   This means your lungs cannot get enough oxygen into your blood and out to your organs  Extra oxygen can help prevent organ failure  ? A ventilator  may be used to help you breathe  ? Convalescent plasma (part of blood)  from a patient who has recovered from COVID-19 may be used  The plasma contains antibodies that can help your body fight the infection  Convalescent plasma is only given to patients who have severe signs and symptoms  How the 2019 coronavirus spreads: The virus spreads quickly and easily  You can become infected if you are in contact with a large amount of the virus, even for a short time  You can also become infected by being around a small amount of virus for a long time  The following are ways the virus is thought to spread, but more information may be coming:  · Droplets are the most common way all coronaviruses spread  The virus can travel in droplets that form when a person talks, coughs, or sneezes  Anyone who breathes in the droplets or gets them in his or her eyes can become infected with the virus  Close personal contact with an infected person is thought to be the main way the virus spreads  Close personal contact means you are within 6 feet (2 meters) of the person  · Person-to-person contact can spread the virus  For example, a person with the virus on his or her hands can spread it by shaking hands with someone  At this time, it does not appear that the virus can be passed to a baby during pregnancy or delivery  The baby can be infected after he or she is born through person-to-person contact  The virus also does not appear to spread in breast milk  If you are pregnant or breastfeeding, talk to your healthcare provider or obstetrician about any concerns you have  · The virus can stay on objects and surfaces  A person can get the virus on his or her hands by touching the object or surface  Infection happens if the person then touches his or her eyes or mouth with unwashed hands   It is not yet known how long the virus can stay on an object or surface  That is why it is important to clean all surfaces that are used regularly  · An infected animal may be able to infect a person who touches it  This may happen at live markets or on a farm  How everyone can lower the risk for COVID-19:  The best way to prevent infection is to avoid anyone who is infected, but this can be hard to do  An infected person can spread the virus before signs or symptoms begin, or even if signs or symptoms never develop  The following can help lower the risk for infection:      · Wash your hands often throughout the day  Use soap and water  Rub your soapy hands together, lacing your fingers  Wash the front and back of each hand, and in between your fingers  Use the fingers of one hand to scrub under the fingernails of the other hand  Wash for at least 20 seconds  Rinse with warm, running water for several seconds  Then dry your hands with a clean towel or paper towel  Use hand  that contains alcohol if soap and water are not available  Do not touch your eyes, nose, or mouth without washing your hands first  Teach children how to wash their hands and use hand   · Cover a sneeze or cough  This prevents droplets from traveling from you to others  Turn your face away and cover your mouth and nose with a tissue  Throw the tissue away  Use the bend of your arm if a tissue is not available  Then wash your hands well with soap and water or use hand   Turn and cover your face if you are around someone who is sneezing or coughing  Teach children how to cover a cough or sneeze  · Follow worldwide, national, and local social distancing guidelines  Social distancing means people avoid close physical contact so the virus cannot spread from one person to another  Keep at least 6 feet (2 meters) between you and others   Also keep this distance from anyone who comes to your home, such as someone making a delivery  · Make a habit of not touching your face  It is not known how long the virus can stay on objects and surfaces  If you get the virus on your hands, you can transfer it to your eyes, nose, or mouth and become infected  You can also transfer it to objects, surfaces, or people  Be aware of what you touch when you go out  Examples include handrails and elevator buttons  Try not to touch anything with bare hands unless it is necessary  Wash your hands before you leave your home and when you return  · Clean and disinfect high-touch surfaces and objects often  Use a disinfecting solution or wipes  You can make a solution by diluting 4 teaspoons of bleach in 1 quart (4 cups) of water  Clean and disinfect even if you think no one living in or coming to your home is infected with the virus  You can wipe items with a disinfecting cloth before you bring them into your home  Wash your hands after you handle anything you bring into your home  · Make your immune system as healthy as possible  A weakened immune system makes you more vulnerable to the new coronavirus  No COVID-19 vaccine is available yet  Vaccines such as the flu and pneumonia vaccines can help your immune system  Your healthcare provider can tell you which vaccines to get, and when to get them  Keep your immune system as strong as possible  Do not smoke  Eat healthy foods, exercise regularly, and try to manage stress  Go to bed and wake up at the same times each day  Social distancing guidelines:  National and local social distancing rules vary  Rules may change over time as restrictions are lifted  Restrictions may return if an outbreak happens where you live  It is important to know and follow all current social distancing rules in your area  The following are general guidelines:  · Limit trips out of your home  You may be able to have food, medicines, and other supplies delivered   If possible, have delivered items left at your door or other area  Try not to have someone hand you an item  You will be so close to the person that the virus can spread between you  · Do not have close physical contact with anyone who does not live in your home  Do not shake hands with, hug, or kiss a person as a greeting  Stand or walk as far from others as possible  If you must use public transportation (such as a bus or subway), try to sit or stand away from others  You can stay safely connected with others through phone calls, e-mail messages, social media websites, and video chats  Check in on anyone who may be having a hard time socially distancing, or who lives alone  Ask administrators at nursing homes or long-term care facilities how you can safely communicate with someone living there  · Wear a cloth face covering around others who do not live in your home  Face coverings help prevent the virus from spreading to others in droplets  You can use a clear face covering if someone needs to read your lips  This is a cloth covering that has plastic over the mouth area so your lips can be seen  Do not use coverings that have breathing valves or vents  The virus can travel out of the valve or vent and be spread to others  Do not take your covering off to talk, cough, or sneeze  Do not use coverings on children younger than 2 years or on anyone who has breathing problems or cannot remove it  · Only allow medical or other necessary professionals into your home  Wear your face covering, and remind professionals to wear a face covering  Remind them to wash their hands when they arrive and before they leave  Do not  let anyone who does not live in your home in, even if the person is not sick  A person can pass the virus to others before symptoms of COVID-19 begin  Some people never even develop symptoms  Children commonly have mild symptoms or no symptoms  It may be hard to tell a child not to hug or kiss you   Explain that this is how he or she can help you stay healthy  · Do not go to someone else's home unless it is necessary  Do not go over to visit, even if the person is lonely  Only go if you need to help him or her  Make sure you both wear face coverings while you are there  · Avoid large gatherings and crowds  Gatherings or crowds of 10 or more individuals can cause the virus to spread  Examples of gatherings include parties, sporting events, Taoism services, and conferences  Crowds may form at beaches, j[place], and tourist attractions  Protect yourself by staying away from large gatherings and crowds  · Ask your healthcare provider for other ways to have appointments  You may be able to have appointments without having to go into the provider's office  Some providers offer phone, video, or other types of appointments  You may also be able to get prescriptions for a few months of your medicines at a time  · Stay safe if you must go out to work  You may have a job that can only be done outside your home  Keep physical distance between you and other workers as much as possible  Follow your employer's rules so everyone stays safe  If you have COVID-19 and are recovering at home:  Healthcare providers will give you specific instructions to follow  The following are general guidelines to remind you how to keep others safe until you are well:  · Wash your hands often  Use soap and water as much as possible  You can use hand  that contains alcohol if soap and water are not available  Do not share towels with anyone  If you use paper towels, throw them away in a lined trash can kept in your room or area  Use a covered trash can, if possible  · Do not go out of your home unless it is necessary  You may have to go to your healthcare provider's office for check-ups or to get prescription refills  Do not arrive at the provider's office without an appointment  Providers have to make their offices safe for staff and other patients      · Do not have close physical contact with anyone unless it is necessary  Only have close physical contact with a person giving direct care, or a baby or child you must care for  Family members and friends should not visit you  If possible, stay in a separate area or room of your home if you live with others  No one should go into the area or room except to give you care  You can visit with others by phone, video chat, e-mail, or similar systems  It is important to stay connected with others in your life while you recover  · Wear a face covering while others are near you  This can help prevent droplets from spreading the virus when you talk, sneeze, or cough  Put the covering on before anyone comes into your room or area  Remind the person to cover his or her nose and mouth before going in to provide care for you  · Do not share items  Do not share dishes, towels, or other items with anyone  Items need to be washed after you use them  · Protect your baby  Wash your hands with soap and water often throughout the day  Wear a clean face covering while you breastfeed, or while you express or pump breast milk  If possible, ask someone who is well to care for your baby  You can put breast milk in bottles for the person to use, if needed  Talk to your healthcare provider if you have any questions or concerns about caring for or bonding with your baby  He or she will tell you when to bring your baby in for check-ups and vaccines  He or she will also tell you what to do if you think your baby was infected with the new virus  · Do not handle live animals  Until more is known, it is best not to touch, play with, or handle live animals  Some animals, including pets, have been infected with the new coronavirus  Do not handle or care for animals until you are well  Care includes feeding, petting, and cuddling your pet  Do not let your pet lick you or share your food   Ask someone who is not infected to take care of your pet, if possible  If you must care for a pet, wear a face covering  Wash your hands before and after you give care  · Follow directions from your healthcare provider for being around others after you recover  You will need to wait at least 10 days after symptoms first appeared  Then you will need to have no fever for 24 hours without fever medicine, and no other symptoms  A loss of taste or smell may continue for several months  It is considered okay to be around others if this is your only symptom  It is not known for sure if or for how long a recovered person can pass the virus to others  Your provider may give you instructions, such as continuing social distancing or wearing a face covering around others  How to take care of someone who has COVID-19:  If the person lives in another home, arrange for a time to give care  Remember to bring a few pairs of disposable gloves and a cloth face covering  The following are general guidelines to help you safely care for anyone who has COVID-19:  · Wash your hands often  Wash before and after you go into the person's home, area, or room  Throw paper towels away in a lined trash can that has a lid, if possible  · Do not allow others to go near the person  No one should come into the person's home unless it is necessary  If possible, the person should be in a separate area or room if he or she lives with others  Keep the room's door shut unless you need to go in or out  Have others call, video chat, or e-mail the person if he or she is feeling well enough  The person may feel lonely if he or she is kept separate for a long period of time  Safe communication can help him or her stay connected to family and friends  · Make sure the person's room has good air flow  You may be able to open the window if the weather allows  An air conditioner can also be turned on to help air move  · Contact the person before you go in to give care    Make sure the person is wearing a face covering  Remind him or her to wash his or her hands with soap and water  He or she can use hand  that contains alcohol if soap and water are not available  Put on a face covering before you go in to give care  · Wear gloves while you give care and clean  Clean items the person uses often  Clean countertops, cooking surfaces, and the fronts and insides of the microwave and refrigerator  Clean the shower, toilet, the area around the toilet, the sink, the area around the sink, and faucets  Gather used laundry or bedding  Wash and dry items on the warmest settings the fabric allows  Wash dishes and silverware in hot, soapy water or in a   · Anything you throw away needs to go into a lined trash can  When you need to empty the trash, close the open end of the lining and tie it closed  This helps prevent items the virus is on from spilling out of the trash  Remove your gloves and throw them away  Wash your hands  Follow up with your doctor as directed:  Write down your questions so you remember to ask them during your visits  For more information:   · Centers for Disease Control and Prevention  [address] , [address]  Phone: 5- 523 - 202-5434  Web Address: [employer] com br    © Copyright [address] [place] [address] Drive Information is for End User's use only and may not be sold, redistributed or otherwise used for commercial purposes  All illustrations and images included in CareNotes® are the copyrighted property of A D A M , Inc  or [facility] Navneet Thompson   The above information is an  only  It is not intended as medical advice for individual conditions or treatments  Talk to your doctor, nurse or pharmacist before following any medical regimen to see if it is safe and effective for you

## 2021-01-12 NOTE — TELEPHONE ENCOUNTER
JUST  HAD  VIRTUAL   VISIT  WITH [facility]  LAST  WEEK   BUT  COVID  TEST  WASN'T  ORDER   PT  GOT  AN  RX   BUT  HE  SAID  HE  STILL  ISN'T  FELLING  BETTER    JUST  FOUND  OUT  FRIEND  FATHER    IS  POSTIVE  FOR  COVID   SO HE  WOULD  LIKE   A  TEST  IF  POSSIBLE    Eduardo Hayden   [id number]

## 2021-01-13 ENCOUNTER — TELEPHONE (OUTPATIENT)
Dept: INTERNAL MEDICINE CLINIC | Facility: CLINIC | Age: 36
End: 2021-01-13

## 2021-01-13 LAB
FLUAV RNA NPH QL NAA+PROBE: NOT DETECTED
FLUBV RNA NPH QL NAA+PROBE: NOT DETECTED
RSV RNA NPH QL NAA+PROBE: NOT DETECTED
SARS-COV-2 RNA NPH QL NAA+PROBE: NOT DETECTED

## 2021-01-13 NOTE — TELEPHONE ENCOUNTER
----- Message from Avis Duarte sent at 1/13/2021  7:48 AM EST -----  Please let patient know he is negative for covid and flu  For throat use warm salt water gargles, can use throat lozenges, warm tea with honey  Thanks

## 2021-02-02 ENCOUNTER — TELEMEDICINE (OUTPATIENT)
Dept: INTERNAL MEDICINE CLINIC | Facility: CLINIC | Age: 36
End: 2021-02-02
Payer: COMMERCIAL

## 2021-02-02 DIAGNOSIS — K22.2 ESOPHAGEAL STRICTURE: ICD-10-CM

## 2021-02-02 DIAGNOSIS — K44.9 HIATAL HERNIA: ICD-10-CM

## 2021-02-02 DIAGNOSIS — R05.9 COUGH: Primary | ICD-10-CM

## 2021-02-02 PROCEDURE — 99213 OFFICE O/P EST LOW 20 MIN: CPT | Performed by: INTERNAL MEDICINE

## 2021-02-02 NOTE — PROGRESS NOTES
COVID-19 Virtual Visit     Assessment/Plan:    Problem List Items Addressed This Visit        Digestive    Dysphagia      Other Visit Diagnoses     Cough    -  Primary    Relevant Orders    Novel Coronavirus (Covid-19),PCR [facility] - Collected in Office    Hiatal hernia        Relevant Orders    Ambulatory referral to Gastroenterology         Disposition:     I recommended the patient to come to our office to perform PCR testing for COVID-19  He requires COVID-19 testing for his job  Will also have him see GI due to esophageal stricture/hiatal hernia  I have spent 10 minutes directly with the patient  Greater than 50% of this time was spent in counseling/coordination of care regarding: patient and family education and impressions  Encounter provider Adam Kirby DO    Provider located at [address] [place] [address]    Recent Visits  No visits were found meeting these conditions  Showing recent visits within past 7 days and meeting all other requirements     Today's Visits  Date Type Provider Dept   02/02/21 Telemedicine Adam Kirby, [address] today's visits and meeting all other requirements     Future Appointments  No visits were found meeting these conditions  Showing future appointments within next 150 days and meeting all other requirements      This virtual check-in was done via [employer] Du[employer] and patient was informed that this is not a secure, HIPAA-compliant platform  He agrees to proceed  Patient agrees to participate in a virtual check in via telephone or video visit instead of presenting to the office to address urgent/immediate medical needs  Patient is aware this is a billable service  After connecting through [facility], the patient was identified by name and date of birth   Zenia Carlo was informed that this was a telemedicine visit and that the exam was being conducted confidentially over secure lines  My office door was closed  No one else was in the room  Karli Laird acknowledged consent and understanding of privacy and security of the telemedicine visit  I informed the patient that I have reviewed his record in Epic and presented the opportunity for him to ask any questions regarding the visit today  The patient agreed to participate  Subjective:   Karli Laird is a 30 y o  male who is concerned about COVID-19  Patient's symptoms include cough (persistent, dry)  Patient denies fever, chills, fatigue, malaise, congestion, rhinorrhea, sore throat, anosmia, loss of taste, shortness of breath, chest tightness, abdominal pain, nausea, vomiting, diarrhea, myalgias and headaches  Date of symptom onset: 1/5/2021    Exposure:   Contact with a person who is under investigation (PUI) for or who is positive for COVID-19 within the last 14 days?: No    Hospitalized recently for fever and/or lower respiratory symptoms?: No      Currently a healthcare worker that is involved in direct patient care?: No      Works in a special setting where the risk of COVID-19 transmission may be high? (this may include long-term care, correctional and senior living facilities; homeless shelters; assisted-living facilities and group homes ): No      Resident in a special setting where the risk of COVID-19 transmission may be high? (this may include long-term care, correctional and senior living facilities; homeless shelters; assisted-living facilities and group homes ): No      Tested at the beginning of January and at that time, he had congestion, sore throat, chills, cough  Symptoms have improved except for persistent cough  No fevers  No SOB/chest tightness  He needs to get re-tested for his job to be able to go back  Also in the past, he was having problems with swallowing  Had EGD and found to have esophageal stricture/hiatal hernia/gastroparesis  He underwent dilation   Having some of those problems again and wanted to see Dr Jaime Henrandez again  Lab Results   Component Value Date    SARSCOV2 Not Detected 01/12/2021    SARSCOV2 Not Detected 11/20/2020     Past Medical History:   Diagnosis Date    Anal abscess 3/12/2019    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     Past Surgical History:   Procedure Laterality Date    ABCESS DRAINAGE  10/29/2018    carlos anal    ANAL FISTULOTOMY N/A 3/14/2019    Procedure: FISTULOTOMY;  Surgeon: Eli Becerril MD;  [facility];  Service: Colorectal    [place] COLONOSCOPY FLX DX W/COLLJ SPEC WHEN PFRMD N/A 11/21/2018    Procedure: COLONOSCOPY;  Surgeon: Daija Calzada MD;  [facility]; Service: Gastroenterology    [place] ESOPHAGOGASTRODUODENOSCOPY TRANSORAL DIAGNOSTIC N/A 11/21/2018    Procedure: ESOPHAGOGASTRODUODENOSCOPY (EGD); Surgeon: Daija Calzada MD;  [facility]; Service: Gastroenterology    [place] ESOPHAGOGASTRODUODENOSCOPY TRANSORAL DIAGNOSTIC N/A 2/7/2019    Procedure: ESOPHAGOGASTRODUODENOSCOPY (EGD); Surgeon: Daija Calzada MD;  [facility]; Service: Gastroenterology   Kristen Bethrifelice LAMNOTMY INCL W/DCMPRSN NRV ROOT 1 INTRSPC LUMBR Left 11/22/2019    Procedure: LAMINECTOMY LUMBAR/THORACIC; L4/5 microdiscectomy;  Surgeon: Christin Gao MD;  Location:  MAIN OR;  Service: Neurosurgery    TONSILLECTOMY       No current outpatient medications on file  No current facility-administered medications for this visit  No Known Allergies    Review of Systems   Constitutional: Negative for chills, fatigue and fever  HENT: Positive for trouble swallowing  Negative for congestion, rhinorrhea and sore throat  Respiratory: Positive for cough (persistent, dry)  Negative for chest tightness and shortness of breath  Gastrointestinal: Negative for abdominal pain, diarrhea, nausea and vomiting  Musculoskeletal: Negative for myalgias  Neurological: Negative for headaches  Objective: There were no vitals filed for this visit      Physical Exam  Constitutional:       General: He is not in acute distress  Appearance: He is well-developed  Eyes:      General: No scleral icterus  Right eye: No discharge  Left eye: No discharge  Conjunctiva/sclera: Conjunctivae normal    Pulmonary:      Effort: Pulmonary effort is normal  No respiratory distress  Comments: No cough or increased work of breathing noted  Neurological:      Mental Status: He is alert and oriented to person, place, and time  Psychiatric:         Mood and Affect: Mood normal          Behavior: Behavior normal        VIRTUAL VISIT DISCLAIMER    Oriana White acknowledges that he has consented to an online visit or consultation  He understands that the online visit is based solely on information provided by him, and that, in the absence of a face-to-face physical evaluation by the physician, the diagnosis he receives is both limited and provisional in terms of accuracy and completeness  This is not intended to replace a full medical face-to-face evaluation by the physician  Oriana White understands and accepts these terms

## 2021-02-03 ENCOUNTER — TELEPHONE (OUTPATIENT)
Dept: INTERNAL MEDICINE CLINIC | Facility: CLINIC | Age: 36
End: 2021-02-03

## 2021-02-03 PROCEDURE — U0005 INFEC AGEN DETEC AMPLI PROBE: HCPCS | Performed by: INTERNAL MEDICINE

## 2021-02-03 PROCEDURE — U0003 INFECTIOUS AGENT DETECTION BY NUCLEIC ACID (DNA OR RNA); SEVERE ACUTE RESPIRATORY SYNDROME CORONAVIRUS 2 (SARS-COV-2) (CORONAVIRUS DISEASE [COVID-19]), AMPLIFIED PROBE TECHNIQUE, MAKING USE OF HIGH THROUGHPUT TECHNOLOGIES AS DESCRIBED BY CMS-2020-01-R: HCPCS | Performed by: INTERNAL MEDICINE

## 2021-02-04 ENCOUNTER — TELEPHONE (OUTPATIENT)
Dept: INTERNAL MEDICINE CLINIC | Facility: CLINIC | Age: 36
End: 2021-02-04

## 2021-02-04 LAB — SARS-COV-2 RNA RESP QL NAA+PROBE: NEGATIVE

## 2021-02-04 NOTE — TELEPHONE ENCOUNTER
----- Message from Michael Isaac DO sent at 2/4/2021  3:03 PM EST -----  Call patient and let him know covid test was negative

## 2021-03-01 DIAGNOSIS — B34.9 VIRAL INFECTION, UNSPECIFIED: ICD-10-CM

## 2021-03-01 DIAGNOSIS — Z20.822 EXPOSURE TO COVID-19 VIRUS: ICD-10-CM

## 2021-03-01 PROCEDURE — U0005 INFEC AGEN DETEC AMPLI PROBE: HCPCS | Performed by: NURSE PRACTITIONER

## 2021-03-01 PROCEDURE — U0003 INFECTIOUS AGENT DETECTION BY NUCLEIC ACID (DNA OR RNA); SEVERE ACUTE RESPIRATORY SYNDROME CORONAVIRUS 2 (SARS-COV-2) (CORONAVIRUS DISEASE [COVID-19]), AMPLIFIED PROBE TECHNIQUE, MAKING USE OF HIGH THROUGHPUT TECHNOLOGIES AS DESCRIBED BY CMS-2020-01-R: HCPCS | Performed by: NURSE PRACTITIONER

## 2021-03-02 ENCOUNTER — TELEPHONE (OUTPATIENT)
Dept: INTERNAL MEDICINE CLINIC | Facility: CLINIC | Age: 36
End: 2021-03-02

## 2021-03-02 NOTE — TELEPHONE ENCOUNTER
----- Message from Genevieve Brizuela DO sent at 3/2/2021  5:47 PM EST -----  Patient is positive for covid   Set up a follow up telemedicine visit

## 2021-03-03 ENCOUNTER — TELEMEDICINE (OUTPATIENT)
Dept: INTERNAL MEDICINE CLINIC | Facility: CLINIC | Age: 36
End: 2021-03-03
Payer: COMMERCIAL

## 2021-03-03 DIAGNOSIS — U07.1 COVID-19: Primary | ICD-10-CM

## 2021-03-03 LAB — SARS-COV-2 RNA RESP QL NAA+PROBE: POSITIVE

## 2021-03-03 PROCEDURE — 4004F PT TOBACCO SCREEN RCVD TLK: CPT | Performed by: PHYSICIAN ASSISTANT

## 2021-03-03 PROCEDURE — 99212 OFFICE O/P EST SF 10 MIN: CPT | Performed by: PHYSICIAN ASSISTANT

## 2021-03-03 NOTE — PROGRESS NOTES
Virtual Brief Visit    Assessment/Plan: COVID positive  Minimal symptoms he will stay in quarantine for a total of 2 weeks  Call back if he develops respiratory problems high fever etc     Problem List Items Addressed This Visit     None      Visit Diagnoses     COVID-19    -  Primary                Reason for visit is   Chief Complaint   Patient presents with    Virtual Brief Visit        Encounter provider Anna Forbes PA-C    Provider located at [place] [address] [address] [facility] [address]    Recent Visits  Date Type Provider Dept   03/02/21 Telephone Mariana Medina 7 recent visits within past 7 days and meeting all other requirements     Today's Visits  Date Type Provider Dept   03/03/21 Mariana Perez 7 today's visits and meeting all other requirements     Future Appointments  No visits were found meeting these conditions  Showing future appointments within next 150 days and meeting all other requirements        After connecting through telephone, the patient was identified by name and date of birth  Mukesh Sanchez was informed that this is a telemedicine visit and that the visit is being conducted through telephone  My office door was closed  No one else was in the room  He acknowledged consent and understanding of privacy and security of the platform  The patient has agreed to participate and understands he can discontinue the visit at any time  Patient is aware this is a billable service  Subjective    Mukesh Sanchez is a 30 y o  male     He was found to have positive COVID test 3 days ago he was tested because he was exposed to someone with COVID  His wife is tested positive and has some symptoms  In the past 2 days he has had some mild aching no shortness of breath minimal cough no diarrhea or loss of smell no headache or fatigue    He is currently in quarantine at home with his family who tested positive as well  He is normally active and well       Past Medical History:   Diagnosis Date    Anal abscess 3/12/2019    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       Past Surgical History:   Procedure Laterality Date    ABCESS DRAINAGE  10/29/2018    carlos anal    ANAL FISTULOTOMY N/A 3/14/2019    Procedure: FISTULOTOMY;  Surgeon: Abner Tidwell MD;  [facility];  Service: Colorectal    [place] COLONOSCOPY FLX DX W/COLLJ SPEC WHEN PFRMD N/A 11/21/2018    Procedure: COLONOSCOPY;  Surgeon: Ivone Spears MD;  [facility]; Service: Gastroenterology    ME ESOPHAGOGASTRODUODENOSCOPY TRANSORAL DIAGNOSTIC N/A 11/21/2018    Procedure: ESOPHAGOGASTRODUODENOSCOPY (EGD); Surgeon: Ivone Spears MD;  [facility]; Service: Gastroenterology    ME ESOPHAGOGASTRODUODENOSCOPY TRANSORAL DIAGNOSTIC N/A 2/7/2019    Procedure: ESOPHAGOGASTRODUODENOSCOPY (EGD); Surgeon: Ivone Speras MD;  [facility]; Service: Gastroenterology   Lul Chen LAMNOTMY INCL W/DCMPRSN NRV ROOT 1 INTRSPC LUMBR Left 11/22/2019    Procedure: LAMINECTOMY LUMBAR/THORACIC; L4/5 microdiscectomy;  Surgeon: Elizabeth Vogel MD;  Location:  MAIN OR;  Service: Neurosurgery    TONSILLECTOMY         No current outpatient medications on file  No current facility-administered medications for this visit  No Known Allergies    Review of Systems    There were no vitals filed for this visit  I spent  15 minutes directly with the patient during this visit    VIRTUAL VISIT DISCLAIMER    Gurjit Marie acknowledges that he has consented to an online visit or consultation  He understands that the online visit is based solely on information provided by him, and that, in the absence of a face-to-face physical evaluation by the physician, the diagnosis he receives is both limited and provisional in terms of accuracy and completeness   This is not intended to replace a full medical face-to-face evaluation by the physician  Awilda Bermudez understands and accepts these terms

## 2021-04-25 ENCOUNTER — APPOINTMENT (EMERGENCY)
Dept: RADIOLOGY | Facility: HOSPITAL | Age: 36
End: 2021-04-25
Payer: COMMERCIAL

## 2021-04-25 ENCOUNTER — HOSPITAL ENCOUNTER (EMERGENCY)
Facility: HOSPITAL | Age: 36
Discharge: HOME | End: 2021-04-25
Attending: EMERGENCY MEDICINE
Payer: COMMERCIAL

## 2021-04-25 VITALS
SYSTOLIC BLOOD PRESSURE: 116 MMHG | RESPIRATION RATE: 20 BRPM | OXYGEN SATURATION: 100 % | WEIGHT: 197.75 LBS | DIASTOLIC BLOOD PRESSURE: 55 MMHG | BODY MASS INDEX: 27.58 KG/M2 | TEMPERATURE: 97.8 F | HEART RATE: 75 BPM

## 2021-04-25 DIAGNOSIS — R68.89 FLU-LIKE SYMPTOMS: Primary | ICD-10-CM

## 2021-04-25 DIAGNOSIS — J06.9 UPPER RESPIRATORY INFECTION: ICD-10-CM

## 2021-04-25 DIAGNOSIS — R11.2 NAUSEA VOMITING AND DIARRHEA: ICD-10-CM

## 2021-04-25 DIAGNOSIS — R19.7 NAUSEA VOMITING AND DIARRHEA: ICD-10-CM

## 2021-04-25 LAB
ALBUMIN SERPL BCP-MCNC: 3.8 G/DL (ref 3.5–5)
ALP SERPL-CCNC: 72 U/L (ref 46–116)
ALT SERPL W P-5'-P-CCNC: 26 U/L (ref 12–78)
ANION GAP SERPL CALCULATED.3IONS-SCNC: 9 MMOL/L (ref 4–13)
AST SERPL W P-5'-P-CCNC: 21 U/L (ref 5–45)
BASOPHILS # BLD AUTO: 0.06 THOUSANDS/ΜL (ref 0–0.1)
BASOPHILS NFR BLD AUTO: 1 % (ref 0–1)
BILIRUB SERPL-MCNC: 0.58 MG/DL (ref 0.2–1)
BUN SERPL-MCNC: 8 MG/DL (ref 5–25)
CALCIUM SERPL-MCNC: 8.5 MG/DL (ref 8.3–10.1)
CHLORIDE SERPL-SCNC: 104 MMOL/L (ref 100–108)
CO2 SERPL-SCNC: 26 MMOL/L (ref 21–32)
CREAT SERPL-MCNC: 0.87 MG/DL (ref 0.6–1.3)
EOSINOPHIL # BLD AUTO: 0.2 THOUSAND/ΜL (ref 0–0.61)
EOSINOPHIL NFR BLD AUTO: 2 % (ref 0–6)
ERYTHROCYTE [DISTWIDTH] IN BLOOD BY AUTOMATED COUNT: 12.6 % (ref 11.6–15.1)
GFR SERPL CREATININE-BSD FRML MDRD: 113 ML/MIN/1.73SQ M
GLUCOSE SERPL-MCNC: 97 MG/DL (ref 65–140)
HCT VFR BLD AUTO: 42 % (ref 36.5–49.3)
HGB BLD-MCNC: 14.3 G/DL (ref 12–17)
IMM GRANULOCYTES # BLD AUTO: 0.06 THOUSAND/UL (ref 0–0.2)
IMM GRANULOCYTES NFR BLD AUTO: 1 % (ref 0–2)
LIPASE SERPL-CCNC: 37 U/L (ref 73–393)
LYMPHOCYTES # BLD AUTO: 1.79 THOUSANDS/ΜL (ref 0.6–4.47)
LYMPHOCYTES NFR BLD AUTO: 13 % (ref 14–44)
MCH RBC QN AUTO: 31.8 PG (ref 26.8–34.3)
MCHC RBC AUTO-ENTMCNC: 34 G/DL (ref 31.4–37.4)
MCV RBC AUTO: 94 FL (ref 82–98)
MONOCYTES # BLD AUTO: 1.07 THOUSAND/ΜL (ref 0.17–1.22)
MONOCYTES NFR BLD AUTO: 8 % (ref 4–12)
NEUTROPHILS # BLD AUTO: 10.15 THOUSANDS/ΜL (ref 1.85–7.62)
NEUTS SEG NFR BLD AUTO: 75 % (ref 43–75)
NRBC BLD AUTO-RTO: 0 /100 WBCS
PLATELET # BLD AUTO: 265 THOUSANDS/UL (ref 149–390)
PMV BLD AUTO: 9.3 FL (ref 8.9–12.7)
POTASSIUM SERPL-SCNC: 3.6 MMOL/L (ref 3.5–5.3)
PROT SERPL-MCNC: 6.8 G/DL (ref 6.4–8.2)
RBC # BLD AUTO: 4.49 MILLION/UL (ref 3.88–5.62)
SARS-COV-2 RNA RESP QL NAA+PROBE: NEGATIVE
SODIUM SERPL-SCNC: 139 MMOL/L (ref 136–145)
WBC # BLD AUTO: 13.33 THOUSAND/UL (ref 4.31–10.16)

## 2021-04-25 PROCEDURE — 85025 COMPLETE CBC W/AUTO DIFF WBC: CPT | Performed by: EMERGENCY MEDICINE

## 2021-04-25 PROCEDURE — 99284 EMERGENCY DEPT VISIT MOD MDM: CPT | Performed by: EMERGENCY MEDICINE

## 2021-04-25 PROCEDURE — 96361 HYDRATE IV INFUSION ADD-ON: CPT

## 2021-04-25 PROCEDURE — U0005 INFEC AGEN DETEC AMPLI PROBE: HCPCS | Performed by: EMERGENCY MEDICINE

## 2021-04-25 PROCEDURE — 99283 EMERGENCY DEPT VISIT LOW MDM: CPT

## 2021-04-25 PROCEDURE — 96360 HYDRATION IV INFUSION INIT: CPT

## 2021-04-25 PROCEDURE — 80053 COMPREHEN METABOLIC PANEL: CPT | Performed by: EMERGENCY MEDICINE

## 2021-04-25 PROCEDURE — 36415 COLL VENOUS BLD VENIPUNCTURE: CPT | Performed by: EMERGENCY MEDICINE

## 2021-04-25 PROCEDURE — 71046 X-RAY EXAM CHEST 2 VIEWS: CPT

## 2021-04-25 PROCEDURE — 83690 ASSAY OF LIPASE: CPT | Performed by: EMERGENCY MEDICINE

## 2021-04-25 PROCEDURE — U0003 INFECTIOUS AGENT DETECTION BY NUCLEIC ACID (DNA OR RNA); SEVERE ACUTE RESPIRATORY SYNDROME CORONAVIRUS 2 (SARS-COV-2) (CORONAVIRUS DISEASE [COVID-19]), AMPLIFIED PROBE TECHNIQUE, MAKING USE OF HIGH THROUGHPUT TECHNOLOGIES AS DESCRIBED BY CMS-2020-01-R: HCPCS | Performed by: EMERGENCY MEDICINE

## 2021-04-25 RX ORDER — ONDANSETRON 4 MG/1
4 TABLET, ORALLY DISINTEGRATING ORAL EVERY 6 HOURS PRN
Qty: 20 TABLET | Refills: 0 | Status: SHIPPED | OUTPATIENT
Start: 2021-04-25 | End: 2021-05-05 | Stop reason: ALTCHOICE

## 2021-04-25 RX ORDER — ONDANSETRON 4 MG/1
4 TABLET, ORALLY DISINTEGRATING ORAL ONCE
Status: COMPLETED | OUTPATIENT
Start: 2021-04-25 | End: 2021-04-25

## 2021-04-25 RX ORDER — AZITHROMYCIN 250 MG/1
TABLET, FILM COATED ORAL
Qty: 6 TABLET | Refills: 0 | Status: SHIPPED | OUTPATIENT
Start: 2021-04-25 | End: 2021-04-29

## 2021-04-25 RX ORDER — LOPERAMIDE HYDROCHLORIDE 2 MG/1
2 CAPSULE ORAL 4 TIMES DAILY PRN
Qty: 12 CAPSULE | Refills: 0 | Status: SHIPPED | OUTPATIENT
Start: 2021-04-25 | End: 2021-05-05 | Stop reason: ALTCHOICE

## 2021-04-25 RX ORDER — ALBUTEROL SULFATE 90 UG/1
2 AEROSOL, METERED RESPIRATORY (INHALATION) ONCE
Status: COMPLETED | OUTPATIENT
Start: 2021-04-25 | End: 2021-04-25

## 2021-04-25 RX ADMIN — ONDANSETRON 4 MG: 4 TABLET, ORALLY DISINTEGRATING ORAL at 03:19

## 2021-04-25 RX ADMIN — SODIUM CHLORIDE 1000 ML: 0.9 INJECTION, SOLUTION INTRAVENOUS at 04:59

## 2021-04-25 RX ADMIN — ALBUTEROL SULFATE 2 PUFF: 90 AEROSOL, METERED RESPIRATORY (INHALATION) at 03:19

## 2021-04-25 NOTE — ED PROVIDER NOTES
History  Chief Complaint   Patient presents with    Flu Symptoms     Pt c/o congestion, cough, N/V/D x2 days  denies fevers or exposure to covid  Two days of flu-like symptoms, coughing, fatigue, nausea, vomiting, diarrhea  Cough is nonproductive  Patient did have COVID 2 months ago  Denies new exposure to COVID  PAtient does have a hx of anal fissure, however this sounds like viral GI sx, no abdominal pain, no difficulty passing stool  No melena/bloody BM          None       Past Medical History:   Diagnosis Date    Anal abscess 3/12/2019    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       Past Surgical History:   Procedure Laterality Date    ABCESS DRAINAGE  10/29/2018    carlos anal    ANAL FISTULOTOMY N/A 3/14/2019    Procedure: FISTULOTOMY;  Surgeon: Libertad Hewitt MD;  [facility];  Service: Colorectal    [place] COLONOSCOPY FLX DX W/COLLJ SPEC WHEN PFRMD N/A 11/21/2018    Procedure: COLONOSCOPY;  Surgeon: Noble Rush MD;  [facility]; Service: Gastroenterology    [place] ESOPHAGOGASTRODUODENOSCOPY TRANSORAL DIAGNOSTIC N/A 11/21/2018    Procedure: ESOPHAGOGASTRODUODENOSCOPY (EGD); Surgeon: Noble Rush MD;  [facility]; Service: Gastroenterology    [place] ESOPHAGOGASTRODUODENOSCOPY TRANSORAL DIAGNOSTIC N/A 2/7/2019    Procedure: ESOPHAGOGASTRODUODENOSCOPY (EGD); Surgeon: Noble Rush MD;  [facility]; Service: Gastroenterology    [place] LAMNOTMY INCL W/DCMPRSN NRV ROOT 1 INTRSPC LUMBR Left 11/22/2019    Procedure: LAMINECTOMY LUMBAR/THORACIC; L4/5 microdiscectomy;  Surgeon: Cassandra Chatman MD;  Location:  MAIN OR;  Service: Neurosurgery    TONSILLECTOMY         Family History   Problem Relation Age of Onset    Diabetes Father      I have reviewed and agree with the history as documented      E-Cigarette/Vaping    E-Cigarette Use Never User      E-Cigarette/Vaping Substances    Nicotine No     THC No     CBD No     Flavoring No     Other No     Unknown No      Social History     Tobacco Use    Smoking status: Current Every Day Smoker     Packs/day: 1 00     Years: 15 00     Pack years: 15 00     Types: Cigarettes    Smokeless tobacco: Never Used   Substance Use Topics    Alcohol use: Yes     Frequency: Monthly or less     Drinks per session: 1 or 2     Binge frequency: Never     Comment: very rarely    Drug use: Not Currently     Frequency: 2 0 times per week     Types: Marijuana     Comment: none recently       Review of Systems   Constitutional: Positive for fatigue  Negative for chills and fever  HENT: Positive for congestion and rhinorrhea  Respiratory: Negative for apnea, cough, chest tightness and shortness of breath  Cardiovascular: Negative for chest pain and palpitations  Gastrointestinal: Positive for diarrhea, nausea and vomiting  Negative for abdominal pain and constipation  Genitourinary: Negative for dysuria and flank pain  Musculoskeletal: Negative for back pain and neck pain  Skin: Negative for color change and rash  Allergic/Immunologic: Negative for immunocompromised state  Neurological: Negative for dizziness, syncope and headaches  Physical Exam  Physical Exam  Vitals signs reviewed  Constitutional:       General: He is not in acute distress  Appearance: He is well-developed  He is not ill-appearing, toxic-appearing or diaphoretic  HENT:      Head: Normocephalic and atraumatic  Eyes:      General: No scleral icterus  Right eye: No discharge  Left eye: No discharge  Conjunctiva/sclera: Conjunctivae normal       Pupils: Pupils are equal, round, and reactive to light  Neck:      Musculoskeletal: Normal range of motion and neck supple  Vascular: No JVD  Cardiovascular:      Rate and Rhythm: Normal rate and regular rhythm  Heart sounds: Normal heart sounds  No murmur  No friction rub  No gallop  Pulmonary:      Effort: Pulmonary effort is normal  No respiratory distress        Breath sounds: Wheezing and rhonchi present  No rales  Comments: Breath sounds normal after albuterol  Chest:      Chest wall: No tenderness  Abdominal:      General: Bowel sounds are normal  There is no distension  Palpations: Abdomen is soft  Tenderness: There is no abdominal tenderness  There is no right CVA tenderness, left CVA tenderness, guarding or rebound  Musculoskeletal: Normal range of motion  General: No tenderness or deformity  Skin:     General: Skin is warm and dry  Coloration: Skin is not pale  Findings: No erythema or rash  Neurological:      Mental Status: He is alert and oriented to person, place, and time  Cranial Nerves: No cranial nerve deficit     Psychiatric:         Behavior: Behavior normal          Vital Signs  ED Triage Vitals [04/25/21 0224]   Temperature Pulse Respirations Blood Pressure SpO2   97 8 °F (36 6 °C) 75 20 116/55 100 %      Temp Source Heart Rate Source Patient Position - Orthostatic VS BP Location FiO2 (%)   Oral Monitor Lying Right arm --      Pain Score       --           Vitals:    04/25/21 0224   BP: 116/55   Pulse: 75   Patient Position - Orthostatic VS: Lying         Visual Acuity      ED Medications  Medications   ondansetron (ZOFRAN-ODT) dispersible tablet 4 mg (4 mg Oral Given 4/25/21 0319)   albuterol (PROVENTIL HFA,VENTOLIN HFA) inhaler 2 puff (2 puffs Inhalation Given 4/25/21 0319)   sodium chloride 0 9 % bolus 1,000 mL (0 mL Intravenous Stopped 4/25/21 0644)       Diagnostic Studies  Results Reviewed     Procedure Component Value Units Date/Time    Comprehensive metabolic panel [[id number]] Collected: 04/25/21 0459    Lab Status: Final result Specimen: Blood from Arm, Right Updated: 04/25/21 0523     Sodium 139 mmol/L      Potassium 3 6 mmol/L      Chloride 104 mmol/L      CO2 26 mmol/L      ANION GAP 9 mmol/L      BUN 8 mg/dL      Creatinine 0 87 mg/dL      Glucose 97 mg/dL      Calcium 8 5 mg/dL      AST 21 U/L      ALT 26 U/L      Alkaline Phosphatase 72 U/L      Total Protein 6 8 g/dL      Albumin 3 8 g/dL      Total Bilirubin 0 58 mg/dL      eGFR 113 ml/min/1 73sq m     Narrative:      [place] guidelines for Chronic Kidney Disease (CKD):     Stage 1 with normal or high GFR (GFR > 90 mL/min/1 73 square meters)    Stage 2 Mild CKD (GFR = 60-89 mL/min/1 73 square meters)    Stage 3A Moderate CKD (GFR = 45-59 mL/min/1 73 square meters)    Stage 3B Moderate CKD (GFR = 30-44 mL/min/1 73 square meters)    Stage 4 Severe CKD (GFR = 15-29 mL/min/1 73 square meters)    Stage 5 End Stage CKD (GFR <15 mL/min/1 73 square meters)  Note: GFR calculation is accurate only with a steady state creatinine    Lipase [[id number]]  (Abnormal) Collected: 04/25/21 0459    Lab Status: Final result Specimen: Blood from Arm, Right Updated: 04/25/21 0523     Lipase 37 u/L     CBC and differential [[id number]]  (Abnormal) Collected: 04/25/21 0459    Lab Status: Final result Specimen: Blood from Arm, Right Updated: 04/25/21 0510     WBC 13 33 Thousand/uL      RBC 4 49 Million/uL      Hemoglobin 14 3 g/dL      Hematocrit 42 0 %      MCV 94 fL      MCH 31 8 pg      MCHC 34 0 g/dL      RDW 12 6 %      MPV 9 3 fL      Platelets 501 Thousands/uL      nRBC 0 /100 WBCs      Neutrophils Relative 75 %      Immat GRANS % 1 %      Lymphocytes Relative 13 %      Monocytes Relative 8 %      Eosinophils Relative 2 %      Basophils Relative 1 %      Neutrophils Absolute 10 15 Thousands/µL      Immature Grans Absolute 0 06 Thousand/uL      Lymphocytes Absolute 1 79 Thousands/µL      Monocytes Absolute 1 07 Thousand/µL      Eosinophils Absolute 0 20 Thousand/µL      Basophils Absolute 0 06 Thousands/µL     [facility] [[id number]]  (Normal) Collected: 04/25/21 0319    Lab Status: Final result Specimen: Nares from Nasopharyngeal Swab Updated: 04/25/21 0423     SARS-CoV-2 Negative    Narrative:       The specimen collection materials, transport medium, and/or testing methodology utilized in the production of these test results have been proven to be reliable in a limited validation with an abbreviated program under the Emergency Utilization Authorization provided by the FDA  Testing reported as "Presumptive positive" will be confirmed with secondary testing to ensure result accuracy  Clinical caution and judgement should be used with the interpretation of these results with consideration of the clinical impression and other laboratory testing  Testing reported as "Positive" or "Negative" has been proven to be accurate according to standard laboratory validation requirements  All testing is performed with control materials showing appropriate reactivity at standard intervals  XR chest 2 views   Final Result by April Sickle, DO (04/25 1446)      No acute cardiopulmonary disease  Workstation performed: [id number]                    Procedures  Procedures         ED Course  ED Course as of Apr 26 0225   Sun Apr 25, 2021 0425 SARS-COV-2: Negative   7581 Indicated to patient likely clinical pneumonia  Patient requesting blood work  Will order abdominal blood work                                SBIRT 22yo+      Most Recent Value   SBIRT (23 yo +)   In order to provide better care to our patients, we are screening all of our patients for alcohol and drug use  Would it be okay to ask you these screening questions? Yes Filed at: 04/25/2021 2118   Initial Alcohol Screen: US AUDIT-C    1  How often do you have a drink containing alcohol?  0 Filed at: 04/25/2021 0226   2  How many drinks containing alcohol do you have on a typical day you are drinking? 0 Filed at: 04/25/2021 0226   3a  Male UNDER 65: How often do you have five or more drinks on one occasion? 0 Filed at: 04/25/2021 0226   Audit-C Score  0 Filed at: 04/25/2021 1833   MICHA: How many times in the past year have you       Used an illegal drug or used a prescription medication for non-medical reasons? Never Filed at: 04/25/2021 0226                    [facility]  Number of Diagnoses or Management Options  Flu-like symptoms:   Nausea vomiting and diarrhea:   Upper respiratory infection:   Diagnosis management comments: Well-appearing 30year-old with flu-like symptoms  Clinical diagnosis of pneumonia based on abnormal lung sounds  Patient given azithromycin  In addition patient given symptomatic treatment with Zofran and Imodium  Lab work is normal except for mild elevation white blood cell count  Patient is nontoxic  No further workup required at this time, patient is discharged home with good return precautions in case of worsening condition  Amount and/or Complexity of Data Reviewed  Clinical lab tests: reviewed and ordered  Tests in the radiology section of CPT®: reviewed and ordered        Disposition  Final diagnoses:   Flu-like symptoms   Upper respiratory infection   Nausea vomiting and diarrhea     Time reflects when diagnosis was documented in both MDM as applicable and the Disposition within this note     Time User Action Codes Description Comment    4/25/2021  5:49 AM Makenna Brewer Add [R68 89] Flu-like symptoms     4/25/2021  5:49 AM Linda Brewer Add [J06 9] Upper respiratory infection     4/25/2021  5:49 AM Makenna Brewer Add [R11 2,  R19 7] Nausea vomiting and diarrhea       ED Disposition     ED Disposition Condition Date/Time Comment    Discharge Stable Sun Apr 25, 2021  5:49 AM Nehemiah Hemp discharge to home/self care              Follow-up Information     Follow up With Specialties Details Why Contact Info Additional [id number] [facility], DO Internal Medicine Schedule an appointment as soon as possible for a visit  For follow up to ensure improvement, and for further testing and treatment as needed [address] [facility] [address] [place] [facility] Emergency Medicine  If symptoms worsen [address] Emergency Department, [address] [facility], [facility], [address] [facility], [address]          Discharge Medication List as of 4/25/2021  5:50 AM      START taking these medications    Details   azithromycin (ZITHROMAX) 250 mg tablet Take 2 tablets today then 1 tablet daily x 4 days, Normal      loperamide (IMODIUM) 2 mg capsule Take 1 capsule (2 mg total) by mouth 4 (four) times a day as needed for diarrhea, Starting Sun 4/25/2021, Normal      ondansetron (ZOFRAN-ODT) 4 mg disintegrating tablet Take 1 tablet (4 mg total) by mouth every 6 (six) hours as needed for nausea or vomiting, Starting Sun 4/25/2021, Normal           No discharge procedures on file      PDMP Review     None          ED Provider  Electronically Signed by           Rinku Sánchez,   04/26/21 1513

## 2021-05-05 ENCOUNTER — OFFICE VISIT (OUTPATIENT)
Dept: GASTROENTEROLOGY | Facility: CLINIC | Age: 36
End: 2021-05-05
Payer: COMMERCIAL

## 2021-05-05 VITALS
HEART RATE: 69 BPM | HEIGHT: 71 IN | BODY MASS INDEX: 29.12 KG/M2 | DIASTOLIC BLOOD PRESSURE: 70 MMHG | WEIGHT: 208 LBS | SYSTOLIC BLOOD PRESSURE: 118 MMHG | RESPIRATION RATE: 18 BRPM

## 2021-05-05 DIAGNOSIS — Z87.19 HISTORY OF ESOPHAGEAL STRICTURE: ICD-10-CM

## 2021-05-05 DIAGNOSIS — R13.10 DYSPHAGIA, UNSPECIFIED TYPE: Primary | ICD-10-CM

## 2021-05-05 DIAGNOSIS — K44.9 HIATAL HERNIA: ICD-10-CM

## 2021-05-05 PROCEDURE — 99214 OFFICE O/P EST MOD 30 MIN: CPT | Performed by: PHYSICIAN ASSISTANT

## 2021-05-05 PROCEDURE — 3008F BODY MASS INDEX DOCD: CPT | Performed by: PHYSICIAN ASSISTANT

## 2021-05-05 PROCEDURE — 4004F PT TOBACCO SCREEN RCVD TLK: CPT | Performed by: PHYSICIAN ASSISTANT

## 2021-05-05 RX ORDER — PANTOPRAZOLE SODIUM 40 MG/1
40 TABLET, DELAYED RELEASE ORAL DAILY
Qty: 30 TABLET | Refills: 1 | Status: SHIPPED | OUTPATIENT
Start: 2021-05-05 | End: 2021-12-14 | Stop reason: ALTCHOICE

## 2021-05-05 NOTE — H&P (VIEW-ONLY)
[facility] - Outpatient Follow-up Note  Cortes Luevano 30 y o  male MRN: [id number]  Encounter: [id number]          ASSESSMENT AND PLAN:      1  Dysphagia  2  History of esophageal stricture  3  Hiatal hernia    Patient presents for an evaluation of worsening dysphagia x months  He also reports daily heartburn  He has a history of an esophageal stricture that was dilated in 2019 and has a history of a hiatal hernia as well  His biopsies at the time were negative for EoE  Will plan for EGD to investigate with likely dilation  Will begin Pantoprazole 40mg po daily  GERD modifications reviewed  Smoking cessation recommended   ______________________________________________________________________    SUBJECTIVE:  Patient is a 30year old male who presents to the office for an evaluation of dysphagia  He has a history of an esophageal stricture that was dilated in 2019 and has a history of a hiatal hernia as well  His biopsies at the time were negative for EoE  He reports he has had a return of a progressive dysphagia for months now, almost a year  He also reports he is having daily heartburn  The dysphagia occurs for mainly solids but includes liquids sometimes  He reports it will sometimes lead to vomiting  No melena  No unintentional weight loss  No frequent NSAIDs or ETOH  He is a smoker  He is not on a PPI or H2 blocker  REVIEW OF SYSTEMS IS OTHERWISE NEGATIVE        Historical Information   Past Medical History:   Diagnosis Date    Anal abscess 3/12/2019    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     Past Surgical History:   Procedure Laterality Date    ABCESS DRAINAGE  10/29/2018    carlos anal    ANAL FISTULOTOMY N/A 3/14/2019    Procedure: FISTULOTOMY;  Surgeon: Parth Begum MD;  [facility];  Service: Colorectal    [place] COLONOSCOPY FLX DX W/COLLJ SPEC WHEN PFRMD N/A 11/21/2018    Procedure: COLONOSCOPY;  Surgeon: Rashad Day MD;  [facility]: [facility]; Service: Gastroenterology    [place] ESOPHAGOGASTRODUODENOSCOPY TRANSORAL DIAGNOSTIC N/A 11/21/2018    Procedure: ESOPHAGOGASTRODUODENOSCOPY (EGD); Surgeon: Ivan Mock MD;  [facility]; Service: Gastroenterology    [place] ESOPHAGOGASTRODUODENOSCOPY TRANSORAL DIAGNOSTIC N/A 2/7/2019    Procedure: ESOPHAGOGASTRODUODENOSCOPY (EGD); Surgeon: Ivan Mock MD;  [facility]; Service: Gastroenterology    [place] LAMNOTMY INCL W/DCMPRSN NRV ROOT 1 INTRSPC LUMBR Left 11/22/2019    Procedure: LAMINECTOMY LUMBAR/THORACIC; L4/5 microdiscectomy;  Surgeon: Bob Martin MD;  Location[facility];  Service: Neurosurgery    TONSILLECTOMY       Social History   Social History     Substance and Sexual Activity   Alcohol Use Yes    Frequency: Monthly or less    Drinks per session: 1 or 2    Binge frequency: Never    Comment: very rarely     Social History     Substance and Sexual Activity   Drug Use Not Currently    Frequency: 2 0 times per week    Types: Marijuana    Comment: none recently     Social History     Tobacco Use   Smoking Status Current Every Day Smoker    Packs/day: 1 00    Years: 15 00    Pack years: 15 00    Types: Cigarettes   Smokeless Tobacco Never Used     Family History   Problem Relation Age of Onset    Diabetes Father        Meds/Allergies       Current Outpatient Medications:     pantoprazole (PROTONIX) 40 mg tablet    No Known Allergies        Objective     Blood pressure 118/70, pulse 69, resp  rate 18, height 5' 11" (1 803 m), weight 94 3 kg (208 lb)  Body mass index is 29 01 kg/m²  PHYSICAL EXAM:      General Appearance:   Alert, cooperative, no distress   HEENT:   Normocephalic, atraumatic, anicteric    Neck:  Supple, symmetrical, trachea midline   Lungs:   Clear to auscultation bilaterally; no rales, rhonchi or wheezing; respirations unlabored    Heart[de-identified]   Regular rate and rhythm; no murmur, rub, or gallop     Abdomen:   Soft, non-tender, non-distended; normal bowel sounds; no masses, no organomegaly    Genitalia:   Deferred    Rectal:   Deferred    Extremities:  No cyanosis, clubbing or edema    Pulses:  2+ and symmetric    Skin:  No jaundice, rashes, or lesions    Lymph nodes:  No palpable cervical lymphadenopathy        Lab Results:   No visits with results within 1 Day(s) from this visit  Latest known visit with results is:   Admission on 04/25/2021, Discharged on 04/25/2021   Component Date Value    SARS-CoV-2 04/25/2021 Negative     WBC 04/25/2021 13 33*    RBC 04/25/2021 4 49     Hemoglobin 04/25/2021 14 3     Hematocrit 04/25/2021 42 0     MCV 04/25/2021 94     MCH 04/25/2021 31 8     MCHC 04/25/2021 34 0     RDW 04/25/2021 12 6     MPV 04/25/2021 9 3     Platelets 31/81/0330 265     nRBC 04/25/2021 0     Neutrophils Relative 04/25/2021 75     Immat GRANS % 04/25/2021 1     Lymphocytes Relative 04/25/2021 13*    Monocytes Relative 04/25/2021 8     Eosinophils Relative 04/25/2021 2     Basophils Relative 04/25/2021 1     Neutrophils Absolute 04/25/2021 10 15*    Immature Grans Absolute 04/25/2021 0 06     Lymphocytes Absolute 04/25/2021 1 79     Monocytes Absolute 04/25/2021 1 07     Eosinophils Absolute 04/25/2021 0 20     Basophils Absolute 04/25/2021 0 06     Sodium 04/25/2021 139     Potassium 04/25/2021 3 6     Chloride 04/25/2021 104     CO2 04/25/2021 26     ANION GAP 04/25/2021 9     BUN 04/25/2021 8     Creatinine 04/25/2021 0 87     Glucose 04/25/2021 97     Calcium 04/25/2021 8 5     AST 04/25/2021 21     ALT 04/25/2021 26     Alkaline Phosphatase 04/25/2021 72     Total Protein 04/25/2021 6 8     Albumin 04/25/2021 3 8     Total Bilirubin 04/25/2021 0 58     eGFR 04/25/2021 113     Lipase 04/25/2021 37*         Radiology Results:   Xr Chest 2 Views    Result Date: 4/25/2021  Narrative: CHEST INDICATION:   cough   COMPARISON:  3/10/2020 EXAM PERFORMED/VIEWS:  XR CHEST PA & LATERAL 3/10/2020 FINDINGS: Cardiomediastinal silhouette appears unremarkable  The lungs are clear  No pneumothorax or pleural effusion  Osseous structures appear within normal limits for patient age  Impression: No acute cardiopulmonary disease   Workstation performed: [id number]

## 2021-05-05 NOTE — PROGRESS NOTES
[facility] - Outpatient Follow-up Note  David Murillo 30 y o  male MRN: [id number]  Encounter: [id number]          ASSESSMENT AND PLAN:      1  Dysphagia  2  History of esophageal stricture  3  Hiatal hernia    Patient presents for an evaluation of worsening dysphagia x months  He also reports daily heartburn  He has a history of an esophageal stricture that was dilated in 2019 and has a history of a hiatal hernia as well  His biopsies at the time were negative for EoE  Will plan for EGD to investigate with likely dilation  Will begin Pantoprazole 40mg po daily  GERD modifications reviewed  Smoking cessation recommended   ______________________________________________________________________    SUBJECTIVE:  Patient is a 30year old male who presents to the office for an evaluation of dysphagia  He has a history of an esophageal stricture that was dilated in 2019 and has a history of a hiatal hernia as well  His biopsies at the time were negative for EoE  He reports he has had a return of a progressive dysphagia for months now, almost a year  He also reports he is having daily heartburn  The dysphagia occurs for mainly solids but includes liquids sometimes  He reports it will sometimes lead to vomiting  No melena  No unintentional weight loss  No frequent NSAIDs or ETOH  He is a smoker  He is not on a PPI or H2 blocker  REVIEW OF SYSTEMS IS OTHERWISE NEGATIVE        Historical Information   Past Medical History:   Diagnosis Date    Anal abscess 3/12/2019    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     Past Surgical History:   Procedure Laterality Date    ABCESS DRAINAGE  10/29/2018    carlos anal    ANAL FISTULOTOMY N/A 3/14/2019    Procedure: FISTULOTOMY;  Surgeon: Lorenzo Cardenas MD;  [facility];  Service: Colorectal    [place] COLONOSCOPY FLX DX W/COLLJ SPEC WHEN PFRMD N/A 11/21/2018    Procedure: COLONOSCOPY;  Surgeon: Kimberly Yen MD;  [facility]; Service: Gastroenterology    [place] ESOPHAGOGASTRODUODENOSCOPY TRANSORAL DIAGNOSTIC N/A 11/21/2018    Procedure: ESOPHAGOGASTRODUODENOSCOPY (EGD); Surgeon: Sebastián Pineda MD;  [facility]; Service: Gastroenterology    [place] ESOPHAGOGASTRODUODENOSCOPY TRANSORAL DIAGNOSTIC N/A 2/7/2019    Procedure: ESOPHAGOGASTRODUODENOSCOPY (EGD); Surgeon: Sebastián Pineda MD;  [facility]; Service: Gastroenterology    [place] LAMNOTMY INCL W/DCMPRSN NRV ROOT 1 INTRSPC LUMBR Left 11/22/2019    Procedure: LAMINECTOMY LUMBAR/THORACIC; L4/5 microdiscectomy;  Surgeon: Dory Leventhal, MD;  Location: [facility] OR;  Service: Neurosurgery    TONSILLECTOMY       Social History   Social History     Substance and Sexual Activity   Alcohol Use Yes    Frequency: Monthly or less    Drinks per session: 1 or 2    Binge frequency: Never    Comment: very rarely     Social History     Substance and Sexual Activity   Drug Use Not Currently    Frequency: 2 0 times per week    Types: Marijuana    Comment: none recently     Social History     Tobacco Use   Smoking Status Current Every Day Smoker    Packs/day: 1 00    Years: 15 00    Pack years: 15 00    Types: Cigarettes   Smokeless Tobacco Never Used     Family History   Problem Relation Age of Onset    Diabetes Father        Meds/Allergies       Current Outpatient Medications:     pantoprazole (PROTONIX) 40 mg tablet    No Known Allergies        Objective     Blood pressure 118/70, pulse 69, resp  rate 18, height 5' 11" (1 803 m), weight 94 3 kg (208 lb)  Body mass index is 29 01 kg/m²  PHYSICAL EXAM:      General Appearance:   Alert, cooperative, no distress   HEENT:   Normocephalic, atraumatic, anicteric    Neck:  Supple, symmetrical, trachea midline   Lungs:   Clear to auscultation bilaterally; no rales, rhonchi or wheezing; respirations unlabored    Heart[de-identified]   Regular rate and rhythm; no murmur, rub, or gallop     Abdomen:   Soft, non-tender, non-distended; normal bowel sounds; no masses, no organomegaly    Genitalia:   Deferred    Rectal:   Deferred    Extremities:  No cyanosis, clubbing or edema    Pulses:  2+ and symmetric    Skin:  No jaundice, rashes, or lesions    Lymph nodes:  No palpable cervical lymphadenopathy        Lab Results:   No visits with results within 1 Day(s) from this visit  Latest known visit with results is:   Admission on 04/25/2021, Discharged on 04/25/2021   Component Date Value    SARS-CoV-2 04/25/2021 Negative     WBC 04/25/2021 13 33*    RBC 04/25/2021 4 49     Hemoglobin 04/25/2021 14 3     Hematocrit 04/25/2021 42 0     MCV 04/25/2021 94     MCH 04/25/2021 31 8     MCHC 04/25/2021 34 0     RDW 04/25/2021 12 6     MPV 04/25/2021 9 3     Platelets 93/84/2253 265     nRBC 04/25/2021 0     Neutrophils Relative 04/25/2021 75     Immat GRANS % 04/25/2021 1     Lymphocytes Relative 04/25/2021 13*    Monocytes Relative 04/25/2021 8     Eosinophils Relative 04/25/2021 2     Basophils Relative 04/25/2021 1     Neutrophils Absolute 04/25/2021 10 15*    Immature Grans Absolute 04/25/2021 0 06     Lymphocytes Absolute 04/25/2021 1 79     Monocytes Absolute 04/25/2021 1 07     Eosinophils Absolute 04/25/2021 0 20     Basophils Absolute 04/25/2021 0 06     Sodium 04/25/2021 139     Potassium 04/25/2021 3 6     Chloride 04/25/2021 104     CO2 04/25/2021 26     ANION GAP 04/25/2021 9     BUN 04/25/2021 8     Creatinine 04/25/2021 0 87     Glucose 04/25/2021 97     Calcium 04/25/2021 8 5     AST 04/25/2021 21     ALT 04/25/2021 26     Alkaline Phosphatase 04/25/2021 72     Total Protein 04/25/2021 6 8     Albumin 04/25/2021 3 8     Total Bilirubin 04/25/2021 0 58     eGFR 04/25/2021 113     Lipase 04/25/2021 37*         Radiology Results:   Xr Chest 2 Views    Result Date: 4/25/2021  Narrative: CHEST INDICATION:   cough   COMPARISON:  3/10/2020 EXAM PERFORMED/VIEWS:  XR CHEST PA & LATERAL 3/10/2020 FINDINGS: Cardiomediastinal silhouette appears unremarkable  The lungs are clear  No pneumothorax or pleural effusion  Osseous structures appear within normal limits for patient age  Impression: No acute cardiopulmonary disease   Workstation performed: [id number]

## 2021-05-20 ENCOUNTER — ANESTHESIA (OUTPATIENT)
Dept: GASTROENTEROLOGY | Facility: HOSPITAL | Age: 36
End: 2021-05-20

## 2021-05-20 ENCOUNTER — ANESTHESIA EVENT (OUTPATIENT)
Dept: GASTROENTEROLOGY | Facility: HOSPITAL | Age: 36
End: 2021-05-20

## 2021-05-20 ENCOUNTER — HOSPITAL ENCOUNTER (OUTPATIENT)
Dept: GASTROENTEROLOGY | Facility: HOSPITAL | Age: 36
Setting detail: OUTPATIENT SURGERY
Discharge: HOME | End: 2021-05-20
Attending: INTERNAL MEDICINE | Admitting: INTERNAL MEDICINE
Payer: COMMERCIAL

## 2021-05-20 VITALS
WEIGHT: 206.13 LBS | OXYGEN SATURATION: 97 % | HEART RATE: 64 BPM | BODY MASS INDEX: 28.86 KG/M2 | RESPIRATION RATE: 16 BRPM | HEIGHT: 71 IN | TEMPERATURE: 97.8 F | DIASTOLIC BLOOD PRESSURE: 70 MMHG | SYSTOLIC BLOOD PRESSURE: 110 MMHG

## 2021-05-20 DIAGNOSIS — R13.10 DYSPHAGIA, UNSPECIFIED TYPE: ICD-10-CM

## 2021-05-20 DIAGNOSIS — Z87.19 HISTORY OF ESOPHAGEAL STRICTURE: ICD-10-CM

## 2021-05-20 DIAGNOSIS — K44.9 HIATAL HERNIA: ICD-10-CM

## 2021-05-20 PROCEDURE — 43248 EGD GUIDE WIRE INSERTION: CPT | Performed by: INTERNAL MEDICINE

## 2021-05-20 PROCEDURE — 88305 TISSUE EXAM BY PATHOLOGIST: CPT | Performed by: PATHOLOGY

## 2021-05-20 PROCEDURE — 43239 EGD BIOPSY SINGLE/MULTIPLE: CPT | Performed by: INTERNAL MEDICINE

## 2021-05-20 RX ORDER — SODIUM CHLORIDE, SODIUM LACTATE, POTASSIUM CHLORIDE, CALCIUM CHLORIDE 600; 310; 30; 20 MG/100ML; MG/100ML; MG/100ML; MG/100ML
125 INJECTION, SOLUTION INTRAVENOUS CONTINUOUS
Status: DISCONTINUED | OUTPATIENT
Start: 2021-05-20 | End: 2021-05-24 | Stop reason: HOSPADM

## 2021-05-20 RX ORDER — LIDOCAINE HYDROCHLORIDE 10 MG/ML
INJECTION, SOLUTION EPIDURAL; INFILTRATION; INTRACAUDAL; PERINEURAL AS NEEDED
Status: DISCONTINUED | OUTPATIENT
Start: 2021-05-20 | End: 2021-05-20

## 2021-05-20 RX ORDER — PROPOFOL 10 MG/ML
INJECTION, EMULSION INTRAVENOUS AS NEEDED
Status: DISCONTINUED | OUTPATIENT
Start: 2021-05-20 | End: 2021-05-20

## 2021-05-20 RX ADMIN — PROPOFOL 60 MG: 10 INJECTION, EMULSION INTRAVENOUS at 09:31

## 2021-05-20 RX ADMIN — LIDOCAINE HYDROCHLORIDE 50 MG: 10 INJECTION, SOLUTION EPIDURAL; INFILTRATION; INTRACAUDAL; PERINEURAL at 09:29

## 2021-05-20 RX ADMIN — SODIUM CHLORIDE, SODIUM LACTATE, POTASSIUM CHLORIDE, AND CALCIUM CHLORIDE 125 ML/HR: .6; .31; .03; .02 INJECTION, SOLUTION INTRAVENOUS at 08:47

## 2021-05-20 RX ADMIN — PROPOFOL 40 MG: 10 INJECTION, EMULSION INTRAVENOUS at 09:37

## 2021-05-20 RX ADMIN — PROPOFOL 40 MG: 10 INJECTION, EMULSION INTRAVENOUS at 09:34

## 2021-05-20 RX ADMIN — PROPOFOL 60 MG: 10 INJECTION, EMULSION INTRAVENOUS at 09:30

## 2021-05-20 NOTE — ANESTHESIA PREPROCEDURE EVALUATION
Procedure:  EGD    Relevant Problems   GI/HEPATIC   (+) Dysphagia      NEURO/PSYCH   (+) Depression   (+) History of rectal abscess      PULMONARY   (+) Sleep apnea, unspecified        Physical Exam    Airway    Mallampati score: III  TM Distance: >3 FB  Neck ROM: full     Dental   upper dentures and lower dentures,     Cardiovascular  Rhythm: regular, Rate: normal, Cardiovascular exam normal    Pulmonary  Pulmonary exam normal Breath sounds clear to auscultation,     Other Findings        Anesthesia Plan  ASA Score- 2     Anesthesia Type- IV sedation with anesthesia with ASA Monitors  Additional Monitors:   Airway Plan:           Plan Factors-Exercise tolerance (METS): >4 METS  Chart reviewed  Patient is a current smoker  Patient instructed to abstain from smoking on day of procedure  Patient did not smoke on day of surgery  Induction- intravenous  Postoperative Plan-     Informed Consent- Anesthetic plan and risks discussed with patient  I personally reviewed this patient with the CRNA  Discussed and agreed on the Anesthesia Plan with the CRNA  Saad Johnson

## 2021-05-20 NOTE — INTERVAL H&P NOTE
H&P reviewed  After examining the patient I find no changes in the patients condition since the H&P had been written      Vitals:    05/20/21 0835   BP: 120/53   Pulse: 60   Resp: 16   Temp: 98 °F (36 7 °C)   SpO2: 96%

## 2021-05-20 NOTE — ANESTHESIA POSTPROCEDURE EVALUATION
Post-Op Assessment Note    CV Status:  Stable  Pain Score: 0    Pain management: adequate     Mental Status:  Arousable   Hydration Status:  Stable   PONV Controlled:  None   Airway Patency:  Patent      Post Op Vitals Reviewed: Yes      Staff: Anesthesiologist         No complications documented      /70 (05/20/21 1004)    Temp      Pulse 64 (05/20/21 1004)   Resp 16 (05/20/21 1004)    SpO2 97 % (05/20/21 1004)

## 2021-05-27 ENCOUNTER — TELEPHONE (OUTPATIENT)
Dept: GASTROENTEROLOGY | Facility: CLINIC | Age: 36
End: 2021-05-27

## 2021-09-22 ENCOUNTER — TELEPHONE (OUTPATIENT)
Dept: INTERNAL MEDICINE CLINIC | Facility: CLINIC | Age: 36
End: 2021-09-22

## 2021-09-22 NOTE — TELEPHONE ENCOUNTER
[facility]    Patient is asking if he can get a script for Amoxicillin for Strep throat  His wife and children are being treated for strep and he now has the same thing  Bright Vargas prescribed the medication for his wife Teri Mishra  CB to [facility] if there are any problems with sending in the medication   CB# [phone]

## 2021-10-20 ENCOUNTER — TELEPHONE (OUTPATIENT)
Dept: INTERNAL MEDICINE CLINIC | Facility: CLINIC | Age: 36
End: 2021-10-20

## 2021-10-20 NOTE — TELEPHONE ENCOUNTER
Patient is requesting an updated order to have an MRI of the Lumbar Spine done  He is having very bad back problems from the surgery he had done 2 yrs ago  Advise patient regarding order   [phone]

## 2021-10-20 NOTE — TELEPHONE ENCOUNTER
CALLED PT  AND WAS NOTIFIED CALLED [facility]Y  FOR APPT  AND PT HUNG UP BEFORE TRANSFER AND SHE WILL CALL PT  BACK FOR APPT

## 2021-10-21 ENCOUNTER — OFFICE VISIT (OUTPATIENT)
Dept: NEUROSURGERY | Facility: CLINIC | Age: 36
End: 2021-10-21
Payer: COMMERCIAL

## 2021-10-21 VITALS
TEMPERATURE: 98 F | SYSTOLIC BLOOD PRESSURE: 118 MMHG | HEART RATE: 76 BPM | HEIGHT: 71 IN | RESPIRATION RATE: 16 BRPM | WEIGHT: 206 LBS | BODY MASS INDEX: 28.84 KG/M2 | DIASTOLIC BLOOD PRESSURE: 72 MMHG

## 2021-10-21 DIAGNOSIS — M54.16 LUMBAR RADICULOPATHY: Primary | ICD-10-CM

## 2021-10-21 PROCEDURE — 99213 OFFICE O/P EST LOW 20 MIN: CPT | Performed by: PHYSICIAN ASSISTANT

## 2021-11-05 ENCOUNTER — HOSPITAL ENCOUNTER (OUTPATIENT)
Dept: MRI IMAGING | Facility: CLINIC | Age: 36
Discharge: HOME | End: 2021-11-05
Payer: COMMERCIAL

## 2021-11-05 DIAGNOSIS — M54.16 LUMBAR RADICULOPATHY: ICD-10-CM

## 2021-11-05 PROCEDURE — G1004 CDSM NDSC: HCPCS

## 2021-11-05 PROCEDURE — 72158 MRI LUMBAR SPINE W/O & W/DYE: CPT

## 2021-11-05 PROCEDURE — A9585 GADOBUTROL INJECTION: HCPCS | Performed by: PHYSICIAN ASSISTANT

## 2021-11-05 RX ADMIN — GADOBUTROL 9 ML: 604.72 INJECTION INTRAVENOUS at 09:51

## 2021-11-08 ENCOUNTER — TELEMEDICINE (OUTPATIENT)
Dept: NEUROSURGERY | Facility: CLINIC | Age: 36
End: 2021-11-08
Payer: COMMERCIAL

## 2021-11-08 DIAGNOSIS — M54.16 LUMBAR RADICULOPATHY: Primary | ICD-10-CM

## 2021-11-08 PROCEDURE — G2012 BRIEF CHECK IN BY MD/QHP: HCPCS | Performed by: PHYSICIAN ASSISTANT

## 2021-11-29 ENCOUNTER — TELEPHONE (OUTPATIENT)
Dept: INTERNAL MEDICINE CLINIC | Facility: CLINIC | Age: 36
End: 2021-11-29

## 2021-11-29 DIAGNOSIS — B34.9 VIRAL INFECTION, UNSPECIFIED: Primary | ICD-10-CM

## 2021-11-30 ENCOUNTER — TELEPHONE (OUTPATIENT)
Dept: INTERNAL MEDICINE CLINIC | Facility: CLINIC | Age: 36
End: 2021-11-30

## 2021-11-30 DIAGNOSIS — Z20.822 ENCOUNTER FOR SCREENING LABORATORY TESTING FOR COVID-19 VIRUS: ICD-10-CM

## 2021-11-30 DIAGNOSIS — Z20.822 ENCOUNTER FOR SCREENING LABORATORY TESTING FOR COVID-19 VIRUS: Primary | ICD-10-CM

## 2021-11-30 PROCEDURE — 0241U HB NFCT DS VIR RESP RNA 4 TRGT: CPT | Performed by: INTERNAL MEDICINE

## 2021-12-01 ENCOUNTER — OFFICE VISIT (OUTPATIENT)
Dept: INTERNAL MEDICINE CLINIC | Facility: CLINIC | Age: 36
End: 2021-12-01
Payer: COMMERCIAL

## 2021-12-01 VITALS
OXYGEN SATURATION: 99 % | DIASTOLIC BLOOD PRESSURE: 78 MMHG | TEMPERATURE: 97.5 F | SYSTOLIC BLOOD PRESSURE: 116 MMHG | WEIGHT: 209.6 LBS | HEIGHT: 71 IN | BODY MASS INDEX: 29.34 KG/M2 | HEART RATE: 81 BPM

## 2021-12-01 DIAGNOSIS — J20.9 ACUTE BRONCHITIS WITH WHEEZING: ICD-10-CM

## 2021-12-01 DIAGNOSIS — R05.9 COUGH: Primary | ICD-10-CM

## 2021-12-01 LAB
FLUAV RNA RESP QL NAA+PROBE: NEGATIVE
FLUBV RNA RESP QL NAA+PROBE: NEGATIVE
RSV RNA RESP QL NAA+PROBE: NEGATIVE
SARS-COV-2 RNA RESP QL NAA+PROBE: NEGATIVE

## 2021-12-01 PROCEDURE — 99213 OFFICE O/P EST LOW 20 MIN: CPT

## 2021-12-01 RX ORDER — AZITHROMYCIN 250 MG/1
TABLET, FILM COATED ORAL
Qty: 6 TABLET | Refills: 0 | Status: SHIPPED | OUTPATIENT
Start: 2021-12-01 | End: 2021-12-06

## 2021-12-01 RX ORDER — PREDNISONE 20 MG/1
40 TABLET ORAL DAILY
Qty: 10 TABLET | Refills: 0 | Status: SHIPPED | OUTPATIENT
Start: 2021-12-01 | End: 2021-12-06

## 2021-12-01 RX ORDER — ALBUTEROL SULFATE 2.5 MG/3ML
2.5 SOLUTION RESPIRATORY (INHALATION) EVERY 6 HOURS PRN
Qty: 180 ML | Refills: 5 | Status: SHIPPED | OUTPATIENT
Start: 2021-12-01

## 2021-12-14 ENCOUNTER — OFFICE VISIT (OUTPATIENT)
Dept: GASTROENTEROLOGY | Facility: CLINIC | Age: 36
End: 2021-12-14
Payer: COMMERCIAL

## 2021-12-14 VITALS
HEIGHT: 71 IN | SYSTOLIC BLOOD PRESSURE: 106 MMHG | BODY MASS INDEX: 30.1 KG/M2 | WEIGHT: 215 LBS | HEART RATE: 84 BPM | DIASTOLIC BLOOD PRESSURE: 70 MMHG

## 2021-12-14 DIAGNOSIS — Z87.19 HISTORY OF ESOPHAGEAL STRICTURE: ICD-10-CM

## 2021-12-14 DIAGNOSIS — R13.10 DYSPHAGIA, UNSPECIFIED TYPE: ICD-10-CM

## 2021-12-14 DIAGNOSIS — K21.00 GASTROESOPHAGEAL REFLUX DISEASE WITH ESOPHAGITIS, UNSPECIFIED WHETHER HEMORRHAGE: Primary | ICD-10-CM

## 2021-12-14 PROCEDURE — 99214 OFFICE O/P EST MOD 30 MIN: CPT | Performed by: PHYSICIAN ASSISTANT

## 2021-12-14 RX ORDER — PANTOPRAZOLE SODIUM 40 MG/1
40 TABLET, DELAYED RELEASE ORAL 2 TIMES DAILY
Qty: 180 TABLET | Refills: 0 | Status: SHIPPED | OUTPATIENT
Start: 2021-12-14 | End: 2022-03-12

## 2021-12-14 RX ORDER — SUCRALFATE 1 G/1
1 TABLET ORAL 4 TIMES DAILY
Qty: 120 TABLET | Refills: 0 | Status: SHIPPED | OUTPATIENT
Start: 2021-12-14 | End: 2022-01-13

## 2021-12-15 ENCOUNTER — TELEPHONE (OUTPATIENT)
Dept: GASTROENTEROLOGY | Facility: HOSPITAL | Age: 36
End: 2021-12-15

## 2021-12-16 ENCOUNTER — ANESTHESIA EVENT (OUTPATIENT)
Dept: GASTROENTEROLOGY | Facility: HOSPITAL | Age: 36
End: 2021-12-16

## 2021-12-16 ENCOUNTER — ANESTHESIA (OUTPATIENT)
Dept: GASTROENTEROLOGY | Facility: HOSPITAL | Age: 36
End: 2021-12-16

## 2021-12-16 ENCOUNTER — HOSPITAL ENCOUNTER (OUTPATIENT)
Dept: GASTROENTEROLOGY | Facility: HOSPITAL | Age: 36
Setting detail: OUTPATIENT SURGERY
Discharge: HOME | End: 2021-12-16
Attending: INTERNAL MEDICINE | Admitting: INTERNAL MEDICINE
Payer: COMMERCIAL

## 2021-12-16 VITALS
RESPIRATION RATE: 17 BRPM | TEMPERATURE: 97.3 F | WEIGHT: 213.63 LBS | SYSTOLIC BLOOD PRESSURE: 116 MMHG | BODY MASS INDEX: 29.91 KG/M2 | OXYGEN SATURATION: 96 % | HEIGHT: 71 IN | HEART RATE: 71 BPM | DIASTOLIC BLOOD PRESSURE: 63 MMHG

## 2021-12-16 DIAGNOSIS — R13.10 DYSPHAGIA, UNSPECIFIED TYPE: ICD-10-CM

## 2021-12-16 DIAGNOSIS — K44.9 HIATAL HERNIA: ICD-10-CM

## 2021-12-16 DIAGNOSIS — Z87.19 HISTORY OF ESOPHAGEAL STRICTURE: ICD-10-CM

## 2021-12-16 DIAGNOSIS — K21.00 GASTROESOPHAGEAL REFLUX DISEASE WITH ESOPHAGITIS, UNSPECIFIED WHETHER HEMORRHAGE: ICD-10-CM

## 2021-12-16 PROCEDURE — 88305 TISSUE EXAM BY PATHOLOGIST: CPT | Performed by: PATHOLOGY

## 2021-12-16 PROCEDURE — 43248 EGD GUIDE WIRE INSERTION: CPT | Performed by: INTERNAL MEDICINE

## 2021-12-16 PROCEDURE — 43239 EGD BIOPSY SINGLE/MULTIPLE: CPT | Performed by: INTERNAL MEDICINE

## 2021-12-16 PROCEDURE — 88312 SPECIAL STAINS GROUP 1: CPT | Performed by: PATHOLOGY

## 2021-12-16 PROCEDURE — 88342 IMHCHEM/IMCYTCHM 1ST ANTB: CPT | Performed by: PATHOLOGY

## 2021-12-16 RX ORDER — LIDOCAINE HYDROCHLORIDE 20 MG/ML
INJECTION, SOLUTION EPIDURAL; INFILTRATION; INTRACAUDAL; PERINEURAL AS NEEDED
Status: DISCONTINUED | OUTPATIENT
Start: 2021-12-16 | End: 2021-12-16

## 2021-12-16 RX ORDER — SODIUM CHLORIDE, SODIUM LACTATE, POTASSIUM CHLORIDE, CALCIUM CHLORIDE 600; 310; 30; 20 MG/100ML; MG/100ML; MG/100ML; MG/100ML
125 INJECTION, SOLUTION INTRAVENOUS CONTINUOUS
Status: DISCONTINUED | OUTPATIENT
Start: 2021-12-16 | End: 2021-12-20 | Stop reason: HOSPADM

## 2021-12-16 RX ORDER — PROPOFOL 10 MG/ML
INJECTION, EMULSION INTRAVENOUS AS NEEDED
Status: DISCONTINUED | OUTPATIENT
Start: 2021-12-16 | End: 2021-12-16

## 2021-12-16 RX ADMIN — SODIUM CHLORIDE, SODIUM LACTATE, POTASSIUM CHLORIDE, AND CALCIUM CHLORIDE 125 ML/HR: .6; .31; .03; .02 INJECTION, SOLUTION INTRAVENOUS at 11:32

## 2021-12-16 RX ADMIN — PROPOFOL 50 MG: 10 INJECTION, EMULSION INTRAVENOUS at 11:57

## 2021-12-16 RX ADMIN — PROPOFOL 50 MG: 10 INJECTION, EMULSION INTRAVENOUS at 12:01

## 2021-12-16 RX ADMIN — PROPOFOL 120 MG: 10 INJECTION, EMULSION INTRAVENOUS at 11:55

## 2021-12-16 RX ADMIN — PROPOFOL 50 MG: 10 INJECTION, EMULSION INTRAVENOUS at 11:59

## 2021-12-16 RX ADMIN — LIDOCAINE HYDROCHLORIDE 100 MG: 20 INJECTION, SOLUTION EPIDURAL; INFILTRATION; INTRACAUDAL; PERINEURAL at 11:55

## 2021-12-16 RX ADMIN — PROPOFOL 30 MG: 10 INJECTION, EMULSION INTRAVENOUS at 11:56

## 2021-12-28 ENCOUNTER — TELEPHONE (OUTPATIENT)
Dept: GASTROENTEROLOGY | Facility: CLINIC | Age: 36
End: 2021-12-28

## 2022-03-02 ENCOUNTER — TELEPHONE (OUTPATIENT)
Dept: INTERNAL MEDICINE CLINIC | Facility: CLINIC | Age: 37
End: 2022-03-02

## 2022-03-02 DIAGNOSIS — F17.210 CIGARETTE NICOTINE DEPENDENCE WITHOUT COMPLICATION: Primary | ICD-10-CM

## 2022-03-02 RX ORDER — NICOTINE 21 MG/24HR
1 PATCH, TRANSDERMAL 24 HOURS TRANSDERMAL EVERY 24 HOURS
Qty: 30 PATCH | Refills: 0 | Status: SHIPPED | OUTPATIENT
Start: 2022-03-02

## 2022-03-02 NOTE — TELEPHONE ENCOUNTER
Nina Nur; called about having Dr Emily López order patches for him to quit smoking    Needs the stronger ones; heavy smoker

## 2022-03-12 DIAGNOSIS — R13.10 DYSPHAGIA, UNSPECIFIED TYPE: ICD-10-CM

## 2022-03-12 DIAGNOSIS — Z87.19 HISTORY OF ESOPHAGEAL STRICTURE: ICD-10-CM

## 2022-03-12 DIAGNOSIS — K21.00 GASTROESOPHAGEAL REFLUX DISEASE WITH ESOPHAGITIS, UNSPECIFIED WHETHER HEMORRHAGE: ICD-10-CM

## 2022-03-12 RX ORDER — PANTOPRAZOLE SODIUM 40 MG/1
TABLET, DELAYED RELEASE ORAL
Qty: 60 TABLET | Refills: 2 | Status: SHIPPED | OUTPATIENT
Start: 2022-03-12 | End: 2022-06-17

## 2022-04-08 ENCOUNTER — APPOINTMENT (EMERGENCY)
Dept: CT IMAGING | Facility: HOSPITAL | Age: 37
End: 2022-04-08
Payer: COMMERCIAL

## 2022-04-08 ENCOUNTER — HOSPITAL ENCOUNTER (EMERGENCY)
Facility: HOSPITAL | Age: 37
Discharge: HOME | End: 2022-04-08
Attending: EMERGENCY MEDICINE
Payer: COMMERCIAL

## 2022-04-08 VITALS
SYSTOLIC BLOOD PRESSURE: 115 MMHG | HEART RATE: 79 BPM | RESPIRATION RATE: 17 BRPM | OXYGEN SATURATION: 99 % | TEMPERATURE: 97.9 F | DIASTOLIC BLOOD PRESSURE: 61 MMHG

## 2022-04-08 DIAGNOSIS — R11.2 NAUSEA VOMITING AND DIARRHEA: ICD-10-CM

## 2022-04-08 DIAGNOSIS — R19.7 NAUSEA VOMITING AND DIARRHEA: ICD-10-CM

## 2022-04-08 DIAGNOSIS — R10.9 ABDOMINAL PAIN: Primary | ICD-10-CM

## 2022-04-08 LAB
ALBUMIN SERPL BCP-MCNC: 4.3 G/DL (ref 3.5–5)
ALP SERPL-CCNC: 91 U/L (ref 46–116)
ALT SERPL W P-5'-P-CCNC: 34 U/L (ref 12–78)
ANION GAP SERPL CALCULATED.3IONS-SCNC: 6 MMOL/L (ref 4–13)
AST SERPL W P-5'-P-CCNC: 19 U/L (ref 5–45)
BASOPHILS # BLD AUTO: 0.05 THOUSANDS/ΜL (ref 0–0.1)
BASOPHILS NFR BLD AUTO: 1 % (ref 0–1)
BILIRUB SERPL-MCNC: 0.52 MG/DL (ref 0.2–1)
BUN SERPL-MCNC: 9 MG/DL (ref 5–25)
CALCIUM SERPL-MCNC: 9.2 MG/DL (ref 8.3–10.1)
CHLORIDE SERPL-SCNC: 104 MMOL/L (ref 100–108)
CO2 SERPL-SCNC: 27 MMOL/L (ref 21–32)
CREAT SERPL-MCNC: 0.81 MG/DL (ref 0.6–1.3)
EOSINOPHIL # BLD AUTO: 0.17 THOUSAND/ΜL (ref 0–0.61)
EOSINOPHIL NFR BLD AUTO: 2 % (ref 0–6)
ERYTHROCYTE [DISTWIDTH] IN BLOOD BY AUTOMATED COUNT: 12.6 % (ref 11.6–15.1)
FLUAV RNA RESP QL NAA+PROBE: NEGATIVE
FLUBV RNA RESP QL NAA+PROBE: NEGATIVE
GFR SERPL CREATININE-BSD FRML MDRD: 115 ML/MIN/1.73SQ M
GLUCOSE SERPL-MCNC: 89 MG/DL (ref 65–140)
HCT VFR BLD AUTO: 45.9 % (ref 36.5–49.3)
HGB BLD-MCNC: 15.8 G/DL (ref 12–17)
HOLD SPECIMEN: NORMAL
IMM GRANULOCYTES # BLD AUTO: 0.02 THOUSAND/UL (ref 0–0.2)
IMM GRANULOCYTES NFR BLD AUTO: 0 % (ref 0–2)
LIPASE SERPL-CCNC: 56 U/L (ref 73–393)
LYMPHOCYTES # BLD AUTO: 2.5 THOUSANDS/ΜL (ref 0.6–4.47)
LYMPHOCYTES NFR BLD AUTO: 27 % (ref 14–44)
MCH RBC QN AUTO: 33.1 PG (ref 26.8–34.3)
MCHC RBC AUTO-ENTMCNC: 34.4 G/DL (ref 31.4–37.4)
MCV RBC AUTO: 96 FL (ref 82–98)
MONOCYTES # BLD AUTO: 0.54 THOUSAND/ΜL (ref 0.17–1.22)
MONOCYTES NFR BLD AUTO: 6 % (ref 4–12)
NEUTROPHILS # BLD AUTO: 5.93 THOUSANDS/ΜL (ref 1.85–7.62)
NEUTS SEG NFR BLD AUTO: 64 % (ref 43–75)
NRBC BLD AUTO-RTO: 0 /100 WBCS
PLATELET # BLD AUTO: 287 THOUSANDS/UL (ref 149–390)
PMV BLD AUTO: 9.3 FL (ref 8.9–12.7)
POTASSIUM SERPL-SCNC: 4.1 MMOL/L (ref 3.5–5.3)
PROT SERPL-MCNC: 7.5 G/DL (ref 6.4–8.2)
RBC # BLD AUTO: 4.78 MILLION/UL (ref 3.88–5.62)
RSV RNA RESP QL NAA+PROBE: NEGATIVE
SARS-COV-2 RNA RESP QL NAA+PROBE: NEGATIVE
SODIUM SERPL-SCNC: 137 MMOL/L (ref 136–145)
WBC # BLD AUTO: 9.21 THOUSAND/UL (ref 4.31–10.16)

## 2022-04-08 PROCEDURE — 99284 EMERGENCY DEPT VISIT MOD MDM: CPT

## 2022-04-08 PROCEDURE — 80053 COMPREHEN METABOLIC PANEL: CPT | Performed by: EMERGENCY MEDICINE

## 2022-04-08 PROCEDURE — 96361 HYDRATE IV INFUSION ADD-ON: CPT

## 2022-04-08 PROCEDURE — 83690 ASSAY OF LIPASE: CPT | Performed by: EMERGENCY MEDICINE

## 2022-04-08 PROCEDURE — 85025 COMPLETE CBC W/AUTO DIFF WBC: CPT | Performed by: EMERGENCY MEDICINE

## 2022-04-08 PROCEDURE — 96374 THER/PROPH/DIAG INJ IV PUSH: CPT

## 2022-04-08 PROCEDURE — 96375 TX/PRO/DX INJ NEW DRUG ADDON: CPT

## 2022-04-08 PROCEDURE — 99284 EMERGENCY DEPT VISIT MOD MDM: CPT | Performed by: EMERGENCY MEDICINE

## 2022-04-08 PROCEDURE — G1004 CDSM NDSC: HCPCS

## 2022-04-08 PROCEDURE — 74177 CT ABD & PELVIS W/CONTRAST: CPT

## 2022-04-08 PROCEDURE — 0241U HB NFCT DS VIR RESP RNA 4 TRGT: CPT | Performed by: EMERGENCY MEDICINE

## 2022-04-08 PROCEDURE — 36415 COLL VENOUS BLD VENIPUNCTURE: CPT

## 2022-04-08 RX ORDER — ONDANSETRON 4 MG/1
4 TABLET, ORALLY DISINTEGRATING ORAL EVERY 6 HOURS PRN
Qty: 20 TABLET | Refills: 0 | Status: SHIPPED | OUTPATIENT
Start: 2022-04-08 | End: 2022-04-08 | Stop reason: SDUPTHER

## 2022-04-08 RX ORDER — DICYCLOMINE HCL 20 MG
20 TABLET ORAL 2 TIMES DAILY
Qty: 20 TABLET | Refills: 0 | Status: SHIPPED | OUTPATIENT
Start: 2022-04-08

## 2022-04-08 RX ORDER — KETOROLAC TROMETHAMINE 30 MG/ML
15 INJECTION, SOLUTION INTRAMUSCULAR; INTRAVENOUS ONCE
Status: COMPLETED | OUTPATIENT
Start: 2022-04-08 | End: 2022-04-08

## 2022-04-08 RX ORDER — ONDANSETRON 4 MG/1
4 TABLET, ORALLY DISINTEGRATING ORAL EVERY 6 HOURS PRN
Qty: 20 TABLET | Refills: 0 | Status: SHIPPED | OUTPATIENT
Start: 2022-04-08

## 2022-04-08 RX ORDER — ONDANSETRON 2 MG/ML
4 INJECTION INTRAMUSCULAR; INTRAVENOUS ONCE
Status: COMPLETED | OUTPATIENT
Start: 2022-04-08 | End: 2022-04-08

## 2022-04-08 RX ADMIN — SODIUM CHLORIDE 1000 ML: 0.9 INJECTION, SOLUTION INTRAVENOUS at 14:08

## 2022-04-08 RX ADMIN — KETOROLAC TROMETHAMINE 15 MG: 30 INJECTION, SOLUTION INTRAMUSCULAR at 14:08

## 2022-04-08 RX ADMIN — IOHEXOL 100 ML: 350 INJECTION, SOLUTION INTRAVENOUS at 15:25

## 2022-04-08 RX ADMIN — ONDANSETRON 4 MG: 2 INJECTION INTRAMUSCULAR; INTRAVENOUS at 14:08

## 2022-04-08 NOTE — ED PROVIDER NOTES
History  Chief Complaint   Patient presents with    Abdominal Pain     Patient c/o abdominal pain that he states started for the last week  Patient states pain is on the left side of his abdomen  Patient states vomiting and diarhea  30 y/o male presents to the ED for abd pain, and n/v/d x 3 days  paitent states that he has had stabbing left sided abd pain constant x 3 days  Has had headache, lower back pain, diarrhea, and subjective fever at home  States that his wife had similar symptoms at home  Has not tried anything for the symptoms  Denies any other complaints  History provided by:  Patient  Abdominal Pain  Pain location:  LLQ and LUQ  Pain quality: sharp and stabbing    Pain radiates to:  Does not radiate  Pain severity:  Moderate  Onset quality:  Sudden  Timing:  Constant  Progression:  Worsening  Chronicity:  New  Context: sick contacts    Relieved by:  None tried  Worsened by:  Nothing  Ineffective treatments:  None tried  Associated symptoms: diarrhea, nausea and vomiting    Associated symptoms: no chest pain, no chills, no cough, no dysuria, no fever, no hematuria, no shortness of breath and no sore throat        Prior to Admission Medications   Prescriptions Last Dose Informant Patient Reported? Taking? albuterol (2 5 mg/3 mL) 0 083 % nebulizer solution  Self No No   Sig: Take 3 mL (2 5 mg total) by nebulization every 6 (six) hours as needed for wheezing or shortness of breath   Patient not taking: Reported on 12/14/2021    nicotine (NICODERM CQ) 21 mg/24 hr TD 24 hr patch   No No   Sig: Place 1 patch on the skin every 24 hours   pantoprazole (PROTONIX) 40 mg tablet   No No   Sig: TAKE 1 TABLET BY MOUTH TWICE A DAY   sucralfate (CARAFATE) 1 g tablet   No No   Sig: Take 1 tablet (1 g total) by mouth 4 (four) times a day Crush tablet and mix with 10 ML water to make a slurry        Facility-Administered Medications: None       Past Medical History:   Diagnosis Date    Anal abscess 3/12/2019  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    GERD (gastroesophageal reflux disease)        Past Surgical History:   Procedure Laterality Date    ABCESS DRAINAGE  10/29/2018    carlos anal    ANAL FISTULOTOMY N/A 3/14/2019    Procedure: FISTULOTOMY;  Surgeon: Denver Delaney MD;  [facility];  Service: Colorectal    COLONOSCOPY      MO COLONOSCOPY FLX DX W/COLLJ SPEC WHEN PFRMD N/A 11/21/2018    Procedure: COLONOSCOPY;  Surgeon: Niurka Myers MD;  [facility]; Service: Gastroenterology    MO ESOPHAGOGASTRODUODENOSCOPY TRANSORAL DIAGNOSTIC N/A 11/21/2018    Procedure: ESOPHAGOGASTRODUODENOSCOPY (EGD); Surgeon: Niurka Myers MD;  [facility]; Service: Gastroenterology    MO ESOPHAGOGASTRODUODENOSCOPY TRANSORAL DIAGNOSTIC N/A 2/7/2019    Procedure: ESOPHAGOGASTRODUODENOSCOPY (EGD); Surgeon: Niurka Myers MD;  [facility]; Service: Gastroenterology    MO LAMNOTMY INCL W/DCMPRSN NRV ROOT 1 INTRSPC LUMBR Left 11/22/2019    Procedure: LAMINECTOMY LUMBAR/THORACIC; L4/5 microdiscectomy;  Surgeon: Gael Ram MD;  Location:  MAIN OR;  Service: Neurosurgery    TONSILLECTOMY         Family History   Problem Relation Age of Onset    Diabetes Father      I have reviewed and agree with the history as documented  E-Cigarette/Vaping    E-Cigarette Use Never User      E-Cigarette/Vaping Substances    Nicotine No     THC No     CBD No     Flavoring No     Other No     Unknown No      Social History     Tobacco Use    Smoking status: Current Every Day Smoker     Packs/day: 1 00     Years: 15 00     Pack years: 15 00     Types: Cigarettes    Smokeless tobacco: Never Used   Vaping Use    Vaping Use: Never used   Substance Use Topics    Alcohol use: Yes     Comment: very rarely    Drug use: Not Currently     Frequency: 2 0 times per week     Types: Marijuana     Comment: none recently       Review of Systems   Constitutional: Negative for chills and fever     HENT: Negative for congestion, ear pain and sore throat  Eyes: Negative for pain and visual disturbance  Respiratory: Negative for cough, shortness of breath and wheezing  Cardiovascular: Negative for chest pain and leg swelling  Gastrointestinal: Positive for abdominal pain, diarrhea, nausea and vomiting  Genitourinary: Negative for dysuria, frequency, hematuria and urgency  Musculoskeletal: Negative for neck pain and neck stiffness  Skin: Negative for rash and wound  Neurological: Negative for weakness, numbness and headaches  Psychiatric/Behavioral: Negative for agitation and confusion  All other systems reviewed and are negative  Physical Exam  Physical Exam  Vitals and nursing note reviewed  Constitutional:       Appearance: He is well-developed  HENT:      Head: Normocephalic and atraumatic  Eyes:      Pupils: Pupils are equal, round, and reactive to light  Cardiovascular:      Rate and Rhythm: Normal rate and regular rhythm  Pulmonary:      Effort: Pulmonary effort is normal       Breath sounds: Normal breath sounds  Abdominal:      General: Bowel sounds are normal       Palpations: Abdomen is soft  Tenderness: There is abdominal tenderness in the left lower quadrant  There is no right CVA tenderness, left CVA tenderness, guarding or rebound  Musculoskeletal:         General: Normal range of motion  Cervical back: Normal range of motion and neck supple  Skin:     General: Skin is warm and dry  Neurological:      General: No focal deficit present  Mental Status: He is alert and oriented to person, place, and time        Comments: No focal deficits         Vital Signs  ED Triage Vitals   Temperature Pulse Respirations Blood Pressure SpO2   04/08/22 1159 04/08/22 1159 04/08/22 1159 04/08/22 1159 04/08/22 1159   97 9 °F (36 6 °C) 84 16 147/65 98 %      Temp Source Heart Rate Source Patient Position - Orthostatic VS BP Location FiO2 (%)   04/08/22 1159 04/08/22 1159 04/08/22 1159 04/08/22 1159 --   Oral Monitor Sitting Left arm       Pain Score       04/08/22 1441       4           Vitals:    04/08/22 1159 04/08/22 1409 04/08/22 1638   BP: 147/65 119/67 115/61   Pulse: 84 79 79   Patient Position - Orthostatic VS: Sitting Lying Lying         Visual Acuity      ED Medications  Medications   sodium chloride 0 9 % bolus 1,000 mL (0 mL Intravenous Stopped 4/8/22 1530)   ketorolac (TORADOL) injection 15 mg (15 mg Intravenous Given 4/8/22 1408)   ondansetron (ZOFRAN) injection 4 mg (4 mg Intravenous Given 4/8/22 1408)   iohexol (OMNIPAQUE) 350 MG/ML injection (SINGLE-DOSE) 100 mL (100 mL Intravenous Given 4/8/22 1525)       Diagnostic Studies  Results Reviewed     Procedure Component Value Units Date/Time    COVID/FLU/RSV [[id number]]  (Normal) Collected: 04/08/22 1433    Lab Status: Final result Specimen: Nares from Nose Updated: 04/08/22 1518     SARS-CoV-2 Negative     INFLUENZA A PCR Negative     INFLUENZA B PCR Negative     RSV PCR Negative    Narrative:      FOR PEDIATRIC PATIENTS - copy/paste COVID Guidelines URL to browser: [email or web address]  [employer]    SARS-CoV-2 assay is a Nucleic Acid Amplification assay intended for the  qualitative detection of nucleic acid from SARS-CoV-2 in nasopharyngeal  swabs  Results are for the presumptive identification of SARS-CoV-2 RNA  Positive results are indicative of infection with SARS-CoV-2, the virus  causing COVID-19, but do not rule out bacterial infection or co-infection  with other viruses  Laboratories within the [place] and its  territories are required to report all positive results to the appropriate  public health authorities  Negative results do not preclude SARS-CoV-2  infection and should not be used as the sole basis for treatment or other  patient management decisions   Negative results must be combined with  clinical observations, patient history, and epidemiological information  This test has not been FDA cleared or approved  This test has been authorized by FDA under an Emergency Use Authorization  (EUA)  This test is only authorized for the duration of time the  declaration that circumstances exist justifying the authorization of the  emergency use of an in vitro diagnostic tests for detection of SARS-CoV-2  virus and/or diagnosis of COVID-19 infection under section 564(b)(1) of  the Act, 21 U  S C  011ZKZ-0(W)(0), unless the authorization is terminated  or revoked sooner  The test has been validated but independent review by FDA  and CLIA is pending  Test performed using [employer] Gene[employer]pert: This RT-PCR assay targets N2,  a region unique to SARS-CoV-2  A conserved region in the E-gene was chosen  for pan-Sarbecovirus detection which includes SARS-CoV-2  [place] draw [[id number]] Collected: 04/08/22 1204    Lab Status: Final result Specimen: Blood from Arm, Left Updated: 04/08/22 1401    Narrative: The following orders were created for panel order [place] draw  Procedure                               Abnormality         Status                     ---------                               -----------         ------                     Nancy Courts / Black tube on CUQZ[[id number]]                       Final result                 Please view results for these tests on the individual orders      UA w Reflex to Microscopic w Reflex to Culture [[id number]]     Lab Status: No result Specimen: Urine, Clean Catch     Comprehensive metabolic panel [[id number]] Collected: 04/08/22 1204    Lab Status: Final result Specimen: Blood from Arm, Left Updated: 04/08/22 1222     Sodium 137 mmol/L      Potassium 4 1 mmol/L      Chloride 104 mmol/L      CO2 27 mmol/L      ANION GAP 6 mmol/L      BUN 9 mg/dL      Creatinine 0 81 mg/dL      Glucose 89 mg/dL      Calcium 9 2 mg/dL      AST 19 U/L      ALT 34 U/L      Alkaline Phosphatase 91 U/L      Total Protein 7 5 g/dL      Albumin 4 3 g/dL Total Bilirubin 0 52 mg/dL      eGFR 115 ml/min/1 73sq m     Narrative:      [place] guidelines for Chronic Kidney Disease (CKD):     Stage 1 with normal or high GFR (GFR > 90 mL/min/1 73 square meters)    Stage 2 Mild CKD (GFR = 60-89 mL/min/1 73 square meters)    Stage 3A Moderate CKD (GFR = 45-59 mL/min/1 73 square meters)    Stage 3B Moderate CKD (GFR = 30-44 mL/min/1 73 square meters)    Stage 4 Severe CKD (GFR = 15-29 mL/min/1 73 square meters)    Stage 5 End Stage CKD (GFR <15 mL/min/1 73 square meters)  Note: GFR calculation is accurate only with a steady state creatinine    Lipase [[id number]]  (Abnormal) Collected: 04/08/22 1204    Lab Status: Final result Specimen: Blood from Arm, Left Updated: 04/08/22 1222     Lipase 56 u/L     CBC and differential [[id number]] Collected: 04/08/22 1204    Lab Status: Final result Specimen: Blood from Arm, Left Updated: 04/08/22 1209     WBC 9 21 Thousand/uL      RBC 4 78 Million/uL      Hemoglobin 15 8 g/dL      Hematocrit 45 9 %      MCV 96 fL      MCH 33 1 pg      MCHC 34 4 g/dL      RDW 12 6 %      MPV 9 3 fL      Platelets 425 Thousands/uL      nRBC 0 /100 WBCs      Neutrophils Relative 64 %      Immat GRANS % 0 %      Lymphocytes Relative 27 %      Monocytes Relative 6 %      Eosinophils Relative 2 %      Basophils Relative 1 %      Neutrophils Absolute 5 93 Thousands/µL      Immature Grans Absolute 0 02 Thousand/uL      Lymphocytes Absolute 2 50 Thousands/µL      Monocytes Absolute 0 54 Thousand/µL      Eosinophils Absolute 0 17 Thousand/µL      Basophils Absolute 0 05 Thousands/µL                  CT abdomen pelvis with contrast   Final Result by Davey Casey MD (04/08 1612)         1  No acute abnormality identified in the abdomen or pelvis  2   Diverticulosis without evidence for diverticulitis  3   Stable nonemergent findings as indicated                 Workstation performed: [id number] Procedures  Procedures         ED Course  ED Course as of 04/08/22 1640   Fri Apr 08, 2022   1345 Left sided abd pain- stabbing x 2-3 days  Nausea and vomiting  Headache, lower back pain, diarrhea, wife with similar symptoms  Subjective fever at home  Constant                                SBIRT 22yo+      Most Recent Value   SBIRT (22 yo +)    In order to provide better care to our patients, we are screening all of our patients for alcohol and drug use  Would it be okay to ask you these screening questions? Yes Filed at: 04/08/2022 1320   Initial Alcohol Screen: US AUDIT-C     1  How often do you have a drink containing alcohol? 0 Filed at: 04/08/2022 1320   2  How many drinks containing alcohol do you have on a typical day you are drinking? 0 Filed at: 04/08/2022 1320   3a  Male UNDER 65: How often do you have five or more drinks on one occasion? 0 Filed at: 04/08/2022 1320   3b  FEMALE Any Age, or MALE 65+: How often do you have 4 or more drinks on one occassion? 0 Filed at: 04/08/2022 1320   Audit-C Score 0 Filed at: 04/08/2022 1320   MICHA: How many times in the past year have you    Used an illegal drug or used a prescription medication for non-medical reasons? Never Filed at: 04/08/2022 1320                    MDM  Number of Diagnoses or Management Options  Abdominal pain: new and requires workup  Nausea vomiting and diarrhea: new and requires workup  Diagnosis management comments: Patient with abd pain and n/v/d- like viral  Will get labs, covid/ flu, ct abd/pel, and give fluids, zofran, and toradol  Will reassess  Patient reevaluated and feels improved  Patient updated on results of tests  Discharge instructions given including medications, follow-up, and return precautions  Patient demonstrates verbal understanding and agrees with plan         Amount and/or Complexity of Data Reviewed  Clinical lab tests: ordered and reviewed  Tests in the radiology section of CPT®: ordered and reviewed  Tests in the medicine section of CPT®: ordered and reviewed  Discussion of test results with the performing providers: yes  Decide to obtain previous medical records or to obtain history from someone other than the patient: yes  Obtain history from someone other than the patient: yes  Review and summarize past medical records: yes  Discuss the patient with other providers: yes  Independent visualization of images, tracings, or specimens: yes    Patient Progress  Patient progress: improved      Disposition  Final diagnoses:   Abdominal pain   Nausea vomiting and diarrhea     Time reflects when diagnosis was documented in both MDM as applicable and the Disposition within this note     Time User Action Codes Description Comment    4/8/2022  4:34 PM Celeste Diana A Add [R10 9] Abdominal pain     4/8/2022  4:34 PM Normae Diana A Add [R11 2,  R19 7] Nausea vomiting and diarrhea       ED Disposition     ED Disposition Condition Date/Time Comment    Discharge Stable Fri Apr 8, 2022  4:34 PM Letty Quiet discharge to home/self care  Follow-up Information     Follow up With Specialties Details Why Contact Info Additional [id number] [facility],  Internal Medicine Call in 1 day for follow up within 2-3 days [address] [address] Emergency Department Emergency Medicine Go to  immediately for any new or worsening symptoms   [address] [facility] [id number] [facility] Emergency Department, [address], [place], [facility]          Patient's Medications   Discharge Prescriptions    DICYCLOMINE (BENTYL) 20 MG TABLET    Take 1 tablet (20 mg total) by mouth 2 (two) times a day       Start Date: 4/8/2022  End Date: --       Order Dose: 20 mg       Quantity: 20 tablet    Refills: 0    ONDANSETRON (ZOFRAN-ODT) 4 MG DISINTEGRATING TABLET    Take 1 tablet (4 mg total) by mouth every 6 (six) hours as needed for nausea or vomiting       Start Date: 4/8/2022  End Date: --       Order Dose: 4 mg       Quantity: 20 tablet    Refills: 0       No discharge procedures on file      PDMP Review     None          ED Provider  Electronically Signed by           Jahaira Adam DO  04/08/22 1640

## 2022-06-14 ENCOUNTER — TELEPHONE (OUTPATIENT)
Dept: OTHER | Facility: OTHER | Age: 37
End: 2022-06-14

## 2022-06-15 NOTE — TELEPHONE ENCOUNTER
06/15/2022-CALLED PT, OFFERED TO RESCHEDULE CANCELLED 06/15/2022 APT W/PA, BUT HE HAS TO CHECK HIS WORK SCHEDULE AND WILL CALL OFFICE BACK TO RESCHEDULE

## 2022-06-17 DIAGNOSIS — K21.00 GASTROESOPHAGEAL REFLUX DISEASE WITH ESOPHAGITIS, UNSPECIFIED WHETHER HEMORRHAGE: ICD-10-CM

## 2022-06-17 DIAGNOSIS — Z87.19 HISTORY OF ESOPHAGEAL STRICTURE: ICD-10-CM

## 2022-06-17 DIAGNOSIS — R13.10 DYSPHAGIA, UNSPECIFIED TYPE: ICD-10-CM

## 2022-06-17 RX ORDER — PANTOPRAZOLE SODIUM 40 MG/1
TABLET, DELAYED RELEASE ORAL
Qty: 60 TABLET | Refills: 2 | Status: SHIPPED | OUTPATIENT
Start: 2022-06-17

## 2022-06-27 ENCOUNTER — OFFICE VISIT (OUTPATIENT)
Dept: INTERNAL MEDICINE CLINIC | Facility: CLINIC | Age: 37
End: 2022-06-27
Payer: COMMERCIAL

## 2022-06-27 VITALS
BODY MASS INDEX: 28.93 KG/M2 | SYSTOLIC BLOOD PRESSURE: 118 MMHG | DIASTOLIC BLOOD PRESSURE: 76 MMHG | HEART RATE: 72 BPM | OXYGEN SATURATION: 97 % | TEMPERATURE: 98.3 F | WEIGHT: 207.4 LBS | RESPIRATION RATE: 18 BRPM

## 2022-06-27 DIAGNOSIS — R21 RASH AND NONSPECIFIC SKIN ERUPTION: ICD-10-CM

## 2022-06-27 DIAGNOSIS — R10.33 UMBILICAL PAIN: Primary | ICD-10-CM

## 2022-06-27 PROCEDURE — 99214 OFFICE O/P EST MOD 30 MIN: CPT | Performed by: FAMILY MEDICINE

## 2022-06-27 RX ORDER — KETOCONAZOLE 20 MG/G
CREAM TOPICAL DAILY
Qty: 60 G | Refills: 0 | Status: SHIPPED | OUTPATIENT
Start: 2022-06-27

## 2022-06-27 NOTE — PROGRESS NOTES
FOLLOW-UP OFFICE VISIT  [facility] - [facility]    NAME: Cassandra Calles  AGE: 29 y o  SEX: male  : 1985     DATE: 2022     Assessment and Plan:     Problem List Items Addressed This Visit    None     Visit Diagnoses     Umbilical pain    -  Primary    Rash and nonspecific skin eruption        Relevant Medications    ketoconazole (NIZORAL) 2 % cream      low suspicion for an acute abdomen, appenditis, or diverticulitis  No drainage noted on exam  reassurance provided  ED percautions reviewed  Likely candidal rash in the flexural surface of abdomen and arm  Antifungal provided  Return if symptoms worsen or fail to improve  Chief Complaint:     Chief Complaint   Patient presents with    Physical Exam     Pt states that he has a rash on his chest and also having stomach problems         History of Present Illness:     Pt c/o stomach pain and discharge from the belly button  Started a week ago  Drainage was bloody  Felt a bit feverish this morning  Very tender on the left side the belly button  No hx of abdominal surgery      Review of Systems:     Review of Systems   Constitutional: Positive for fatigue  Negative for fever  Gastrointestinal: Positive for abdominal pain  Negative for abdominal distention, diarrhea and vomiting  Problem List:     Patient Active Problem List   Diagnosis    Internal hemorrhoids    Umbilical bleeding    History of rectal abscess    Periumbilical pain, chronic    Decreased appetite    Migraine    Dysphagia    Lumbar radiculopathy    Spinal stenosis of lumbar region    Depression    Sleep apnea, unspecified        Objective:     /76 (BP Location: Left arm, Patient Position: Sitting, Cuff Size: Standard)   Pulse 72   Temp 98 3 °F (36 8 °C) (Temporal)   Resp 18   Wt 94 1 kg (207 lb 6 4 oz)   SpO2 97%   BMI 28 93 kg/m²     Physical Exam  HENT:      Head: Normocephalic and atraumatic  Cardiovascular:      Rate and Rhythm: Normal rate and regular rhythm  Abdominal:      General: Bowel sounds are normal       Palpations: Abdomen is soft  Tenderness: There is no abdominal tenderness  Hernia: No hernia is present  Skin:     Findings: Rash (colascing macular lesions within skin folds of the abdomen and olecranon fossa ) present  Neurological:      Mental Status: He is alert  Pertinent Laboratory/Diagnostic Studies:    Laboratory Results: I have personally reviewed the pertinent laboratory results/reports         Radiology/Other Diagnostic Testing Results: I have personally reviewed pertinent reports          Tomás CLARK [facility]  6/29/2022 12:22 PM

## 2022-06-27 NOTE — PATIENT INSTRUCTIONS
Start using the cream on the rash at least twice a day  Make sure you drink fluids, take some tyenol and use a heating pad for the stomach  If the pain worsens or you start to have episodes of vomiting go to the emergency right away

## 2022-09-26 ENCOUNTER — HOSPITAL ENCOUNTER (EMERGENCY)
Facility: HOSPITAL | Age: 37
Discharge: HOME | End: 2022-09-26
Attending: EMERGENCY MEDICINE
Payer: COMMERCIAL

## 2022-09-26 ENCOUNTER — APPOINTMENT (OUTPATIENT)
Dept: RADIOLOGY | Facility: HOSPITAL | Age: 37
End: 2022-09-26
Payer: COMMERCIAL

## 2022-09-26 VITALS
WEIGHT: 210 LBS | TEMPERATURE: 97.7 F | OXYGEN SATURATION: 98 % | HEIGHT: 71 IN | DIASTOLIC BLOOD PRESSURE: 69 MMHG | BODY MASS INDEX: 29.4 KG/M2 | HEART RATE: 92 BPM | SYSTOLIC BLOOD PRESSURE: 115 MMHG | RESPIRATION RATE: 18 BRPM

## 2022-09-26 DIAGNOSIS — R05.9 COUGH: ICD-10-CM

## 2022-09-26 DIAGNOSIS — B34.9 VIRAL ILLNESS: Primary | ICD-10-CM

## 2022-09-26 DIAGNOSIS — K12.0 APHTHOUS ULCER OF MOUTH: ICD-10-CM

## 2022-09-26 DIAGNOSIS — R50.9 FEVER: ICD-10-CM

## 2022-09-26 PROCEDURE — 99283 EMERGENCY DEPT VISIT LOW MDM: CPT

## 2022-09-26 PROCEDURE — 94640 AIRWAY INHALATION TREATMENT: CPT

## 2022-09-26 PROCEDURE — 99284 EMERGENCY DEPT VISIT MOD MDM: CPT | Performed by: EMERGENCY MEDICINE

## 2022-09-26 PROCEDURE — 71046 X-RAY EXAM CHEST 2 VIEWS: CPT

## 2022-09-26 RX ORDER — ALBUTEROL SULFATE 2.5 MG/3ML
2.5 SOLUTION RESPIRATORY (INHALATION) ONCE
Status: COMPLETED | OUTPATIENT
Start: 2022-09-26 | End: 2022-09-26

## 2022-09-26 RX ORDER — ALBUTEROL SULFATE 90 UG/1
2 AEROSOL, METERED RESPIRATORY (INHALATION) EVERY 4 HOURS PRN
Qty: 8 G | Refills: 0 | Status: SHIPPED | OUTPATIENT
Start: 2022-09-26 | End: 2022-09-29 | Stop reason: ALTCHOICE

## 2022-09-26 RX ADMIN — ALBUTEROL SULFATE 2.5 MG: 2.5 SOLUTION RESPIRATORY (INHALATION) at 17:16

## 2022-09-26 NOTE — DISCHARGE INSTRUCTIONS
Take over-the-counter cough and cold medications to help with your symptoms  Use the albuterol if you develop coughing spells or trouble breathing  Follow-up with your primary care doctor to make sure you are doing better  Return if you develop severe shortness of breath that does not improve with albuterol, or for any other concerns

## 2022-09-26 NOTE — ED PROVIDER NOTES
History  Chief Complaint   Patient presents with    Flu Symptoms     Pt reports he has had a fever since this morning and vomited, cough x 2 weeks, and swelling in his throat  HPI    Prior to Admission Medications   Prescriptions Last Dose Informant Patient Reported? Taking? albuterol (2 5 mg/3 mL) 0 083 % nebulizer solution  Self No No   Sig: Take 3 mL (2 5 mg total) by nebulization every 6 (six) hours as needed for wheezing or shortness of breath   Patient not taking: No sig reported   dicyclomine (BENTYL) 20 mg tablet  Self No No   Sig: Take 1 tablet (20 mg total) by mouth 2 (two) times a day   Patient not taking: No sig reported   ketoconazole (NIZORAL) 2 % cream   No No   Sig: Apply topically daily   nicotine (NICODERM CQ) 21 mg/24 hr TD 24 hr patch  Self No No   Sig: Place 1 patch on the skin every 24 hours   Patient not taking: No sig reported   ondansetron (ZOFRAN-ODT) 4 mg disintegrating tablet  Self No No   Sig: Take 1 tablet (4 mg total) by mouth every 6 (six) hours as needed for nausea or vomiting   Patient not taking: No sig reported   pantoprazole (PROTONIX) 40 mg tablet  Self No No   Sig: TAKE 1 TABLET BY MOUTH TWICE A DAY   Patient not taking: No sig reported   sucralfate (CARAFATE) 1 g tablet   No No   Sig: Take 1 tablet (1 g total) by mouth 4 (four) times a day Crush tablet and mix with 10 ML water to make a slurry        Facility-Administered Medications: None       Past Medical History:   Diagnosis Date    Anal abscess 3/12/2019    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    GERD (gastroesophageal reflux disease)        Past Surgical History:   Procedure Laterality Date    ABCESS DRAINAGE  10/29/2018    carlos anal    ANAL FISTULOTOMY N/A 3/14/2019    Procedure: FISTULOTOMY;  Surgeon: Stella Garcia MD;  Location: [facility];  Service: Colorectal    COLONOSCOPY      SC COLONOSCOPY FLX DX W/COLLJ SPEC WHEN PFRMD N/A 11/21/2018    Procedure: COLONOSCOPY;  Surgeon: Faye Uriostegui MD;  Location: [facility]; Service: Gastroenterology    MI ESOPHAGOGASTRODUODENOSCOPY TRANSORAL DIAGNOSTIC N/A 11/21/2018    Procedure: ESOPHAGOGASTRODUODENOSCOPY (EGD); Surgeon: Aleja Vaughan MD;  Location: [facility]; Service: Gastroenterology    MI ESOPHAGOGASTRODUODENOSCOPY TRANSORAL DIAGNOSTIC N/A 2/7/2019    Procedure: ESOPHAGOGASTRODUODENOSCOPY (EGD); Surgeon: Aleja Vaughan MD;  Location: [facility]; Service: Gastroenterology    MI LAMNOTMY INCL W/DCMPRSN NRV ROOT 1 INTRSPC LUMBR Left 11/22/2019    Procedure: LAMINECTOMY LUMBAR/THORACIC; L4/5 microdiscectomy;  Surgeon: Bryanna Saeed MD;  [facility];  Service: Neurosurgery    TONSILLECTOMY         Family History   Problem Relation Age of Onset    Diabetes Father      I have reviewed and agree with the history as documented  E-Cigarette/Vaping    E-Cigarette Use Never User      E-Cigarette/Vaping Substances    Nicotine No     THC No     CBD No     Flavoring No     Other No     Unknown No      Social History     Tobacco Use    Smoking status: Current Every Day Smoker     Packs/day: 1 00     Years: 15 00     Pack years: 15 00     Types: Cigarettes    Smokeless tobacco: Never Used   Vaping Use    Vaping Use: Never used   Substance Use Topics    Alcohol use: Yes     Comment: very rarely    Drug use: Not Currently     Frequency: 2 0 times per week     Types: Marijuana     Comment: none recently       Review of Systems    Physical Exam  Physical Exam  Vitals and nursing note reviewed  Constitutional:       General: He is not in acute distress  Appearance: He is well-developed  HENT:      Head: Normocephalic and atraumatic  Jaw: No trismus  Right Ear: Tympanic membrane, ear canal and external ear normal       Left Ear: Tympanic membrane, ear canal and external ear normal       Mouth/Throat:      Mouth: Mucous membranes are moist  Oral lesions present  Tongue: No lesions  Palate: No lesions        Pharynx: No pharyngeal swelling, posterior oropharyngeal erythema or uvula swelling  Tonsils: No tonsillar exudate  Eyes:      Conjunctiva/sclera: Conjunctivae normal       Pupils: Pupils are equal, round, and reactive to light  Cardiovascular:      Rate and Rhythm: Normal rate and regular rhythm  Heart sounds: Normal heart sounds  Pulmonary:      Effort: Pulmonary effort is normal  No respiratory distress  Breath sounds: Normal breath sounds  Abdominal:      General: Bowel sounds are normal  There is no distension  Palpations: Abdomen is soft  Tenderness: There is no abdominal tenderness  Musculoskeletal:      Cervical back: Normal range of motion  Lymphadenopathy:      Cervical: No cervical adenopathy  Skin:     General: Skin is warm and dry  Findings: No rash  Neurological:      Mental Status: He is alert and oriented to person, place, and time  Psychiatric:         Behavior: Behavior normal          Vital Signs  ED Triage Vitals [09/26/22 1619]   Temperature Pulse Respirations Blood Pressure SpO2   97 7 °F (36 5 °C) 92 18 115/69 98 %      Temp Source Heart Rate Source Patient Position - Orthostatic VS BP Location FiO2 (%)   Tympanic Monitor Sitting Left arm --      Pain Score       --           Vitals:    09/26/22 1619   BP: 115/69   Pulse: 92   Patient Position - Orthostatic VS: Sitting         Visual Acuity      ED Medications  Medications   albuterol inhalation solution 2 5 mg (2 5 mg Nebulization Given 9/26/22 1716)       Diagnostic Studies  Results Reviewed     None                 XR chest 2 views    (Results Pending)              Procedures  Procedures         ED Course                                             MDM  Number of Diagnoses or Management Options  Aphthous ulcer of mouth: new and requires workup  Cough: new and requires workup  Fever: new and requires workup  Viral illness: new and requires workup  Diagnosis management comments:  This is a 44-year-old male who presents here today with flu-like symptoms  He began getting sick about three weeks ago, initially with cough  It has been intermittently productive of mucus  He says at one point in time it felt like it had settled into my lungs   He says he initially had other URI symptoms but these had mostly improved  However since yesterday he has had recurrence of fevers up to 102 which continued this morning  He says cough is more recently nonproductive  He feels like there is a spot on his uvula which is painful  He says on 9/23 his son developed fevers and was diagnosed with hand foot mouth disease  He has had occasional nausea with one episode of vomiting and diarrhea  He denies any abdominal pain  He is having some headaches but denies any other myalgias, arthralgias, areas of pain  He denies underlying medical problems  He has not taken or done anything for his symptoms  Review of systems:  Otherwise negative unless stated above    He is well appearing, no acute distress  He has an aphthous ulcer on the uvula and one on the right cheek but no other signs of hand-foot-mouth on exam   He occasionally constant deep breaths but has no wheezing or other adventitious breath sounds  Exam is otherwise unremarkable  Lingering cough is likely due to viral illness, however with new fevers we will get a chest x-ray to evaluate for developing pneumonia though I feel that this is more likely due to hand-foot-mouth disease superimposed on his resolving other viral illness  Though he is not having wheezing at this time given complaints of feeling like it has been in his lungs we will do trial of albuterol to see if it helps his cough  Chest x-ray was reviewed by myself and shows no acute abnormalities  He does endorse some improvements with albuterol  He no longer has any coughing with deep breaths    I discussed with him findings, continued symptomatic management at home, follow-up with his PCP and indications for return, and he expresses understanding with this plan  Amount and/or Complexity of Data Reviewed  Tests in the radiology section of CPT®: reviewed and ordered  Independent visualization of images, tracings, or specimens: yes        Disposition  Final diagnoses:   Viral illness   Cough   Fever   Aphthous ulcer of mouth     Time reflects when diagnosis was documented in both MDM as applicable and the Disposition within this note     Time User Action Codes Description Comment    9/26/2022  6:10 PM Frankie Cameron Add [B34 9] Viral illness     9/26/2022  6:10 PM Frankie Cameron Add [R05 9] Cough     9/26/2022  6:10 PM Frankie Cameron Add [R50 9] Fever     9/26/2022  6:10 PM Frankie Cameron Add [K12 0] Aphthous ulcer of mouth       ED Disposition     ED Disposition   Discharge    Condition   Good    Date/Time   Mon Sep 26, 2022  6:05 PM    Comment   Sunita Ye discharge to home/self care         Follow-up Information     Follow up With Specialties Details Why Contact Info    Juliana Ward DO Internal Medicine Schedule an appointment as soon as possible for a visit  to follow up on your symptoms [address] [place] [address] [address] [facility]            Patient's Medications   Discharge Prescriptions    ALBUTEROL (PROVENTIL HFA,VENTOLIN HFA) 90 MCG/ACT INHALER    Inhale 2 puffs every 4 (four) hours as needed for wheezing or shortness of breath (coughing spells)       Start Date: 9/26/2022 End Date: --       Order Dose: 2 puffs       Quantity: 8 g    Refills: 0       No discharge procedures on file      PDMP Review     None          ED Provider  Electronically Signed by           Hernan Mason MD  09/26/22 6996

## 2022-09-27 ENCOUNTER — TELEPHONE (OUTPATIENT)
Dept: GASTROENTEROLOGY | Facility: CLINIC | Age: 37
End: 2022-09-27

## 2022-09-27 NOTE — TELEPHONE ENCOUNTER
Patients GI provider:  Dr Abrams    Number to return call: [phone]    Reason for call: Pts wife called in to schedule pts 1 yr repeat EGD  Above is her number       Scheduled procedure/appointment date if applicable: Apt/procedure

## 2022-09-27 NOTE — TELEPHONE ENCOUNTER
Lm for ptns wife letting her know due to ptans have GI issues in the past few months ptn needs an office appointment prior to scheduling the repeat EGD   Also ptn needs a [facility] family referral from his PCP to see gastro

## 2022-09-29 ENCOUNTER — OFFICE VISIT (OUTPATIENT)
Dept: INTERNAL MEDICINE CLINIC | Facility: CLINIC | Age: 37
End: 2022-09-29
Payer: COMMERCIAL

## 2022-09-29 VITALS
RESPIRATION RATE: 20 BRPM | SYSTOLIC BLOOD PRESSURE: 116 MMHG | DIASTOLIC BLOOD PRESSURE: 70 MMHG | TEMPERATURE: 97.6 F | WEIGHT: 209 LBS | BODY MASS INDEX: 29.26 KG/M2 | HEART RATE: 64 BPM | HEIGHT: 71 IN | OXYGEN SATURATION: 98 %

## 2022-09-29 DIAGNOSIS — R13.10 DYSPHAGIA, UNSPECIFIED TYPE: Primary | ICD-10-CM

## 2022-09-29 DIAGNOSIS — K22.2 ESOPHAGEAL STRICTURE: ICD-10-CM

## 2022-09-29 DIAGNOSIS — R09.82 POST-NASAL DRIP: ICD-10-CM

## 2022-09-29 DIAGNOSIS — R05.9 COUGH: ICD-10-CM

## 2022-09-29 PROCEDURE — 99214 OFFICE O/P EST MOD 30 MIN: CPT | Performed by: INTERNAL MEDICINE

## 2022-09-29 RX ORDER — BENZONATATE 100 MG/1
100 CAPSULE ORAL 3 TIMES DAILY PRN
Qty: 60 CAPSULE | Refills: 1 | Status: SHIPPED | OUTPATIENT
Start: 2022-09-29

## 2022-09-29 RX ORDER — FLUTICASONE PROPIONATE 50 MCG
2 SPRAY, SUSPENSION (ML) NASAL DAILY
Qty: 16 G | Refills: 3 | Status: SHIPPED | OUTPATIENT
Start: 2022-09-29

## 2022-09-29 NOTE — PROGRESS NOTES
[facility]    NAME: Alcario Severs  AGE: 40 y o  SEX: male  : 1985     DATE: 2022     Assessment and Plan:     1  Dysphagia, unspecified type  2  Esophageal stricture    Currently not taking pantoprazole as he has not had a lot of reflux symptoms  He is due for updated EGD  Referral was given to him today  - Ambulatory referral to Gastroenterology; Future    3  Cough    Suspect postviral cough plus or minus cough from postnasal drip  Start cough medicine  Chest x-ray was reviewed and was normal   No evidence of bacterial infection at this time  - benzonatate (TESSALON PERLES) 100 mg capsule; Take 1 capsule (100 mg total) by mouth 3 (three) times a day as needed for cough  Dispense: 60 capsule; Refill: 1    4  Post-nasal drip    Recommend use of corticosteroid nasal spray  - fluticasone (FLONASE) 50 mcg/act nasal spray; 2 sprays into each nostril daily  Dispense: 16 g; Refill: 3      Tobacco Cessation Counseling: Tobacco cessation counseling was provided  The patient is sincerely urged to quit consumption of tobacco  He is not ready to quit tobacco          History of Present Illness:     Got sick around 3 weeks ago  Still having some persistent cough  Also getting some headaches  Has history of dysphagia, hiatal hernia, esophageal stricture  He is due for a 1 year EGD  States he has not been getting a lot of acid reflux lately  He is worried because his son was diagnosed recent with hand-foot-mouth disease  He has not had any lesions  Went to the ER recently had normal chest x-ray  Review of Systems:     Review of Systems   Constitutional: Negative  HENT: Positive for postnasal drip  Respiratory: Positive for cough  Negative for chest tightness, shortness of breath and wheezing  Cardiovascular: Negative  Gastrointestinal: Negative  Neurological: Positive for headaches        Objective:     /70 (BP Location: Left arm, Patient Position: Sitting, Cuff Size: Standard)   Pulse 64   Temp 97 6 °F (36 4 °C) (Tympanic)   Resp 20   Ht 5' 11" (1 803 m)   Wt 94 8 kg (209 lb)   SpO2 98%   BMI 29 15 kg/m²     Physical Exam    [address]

## 2022-10-06 ENCOUNTER — OFFICE VISIT (OUTPATIENT)
Dept: INTERNAL MEDICINE CLINIC | Facility: CLINIC | Age: 37
End: 2022-10-06
Payer: COMMERCIAL

## 2022-10-06 ENCOUNTER — APPOINTMENT (OUTPATIENT)
Dept: LAB | Facility: CLINIC | Age: 37
End: 2022-10-06
Payer: COMMERCIAL

## 2022-10-06 VITALS
SYSTOLIC BLOOD PRESSURE: 114 MMHG | OXYGEN SATURATION: 98 % | RESPIRATION RATE: 17 BRPM | BODY MASS INDEX: 29.06 KG/M2 | HEART RATE: 95 BPM | DIASTOLIC BLOOD PRESSURE: 72 MMHG | TEMPERATURE: 97.9 F | HEIGHT: 71 IN | WEIGHT: 207.6 LBS

## 2022-10-06 DIAGNOSIS — R09.82 POST-NASAL DRIP: ICD-10-CM

## 2022-10-06 DIAGNOSIS — R30.0 DYSURIA: ICD-10-CM

## 2022-10-06 DIAGNOSIS — R30.0 DYSURIA: Primary | ICD-10-CM

## 2022-10-06 LAB
BILIRUB UR QL STRIP: NEGATIVE
CLARITY UR: CLEAR
COLOR UR: NORMAL
GLUCOSE UR STRIP-MCNC: NEGATIVE MG/DL
HGB UR QL STRIP.AUTO: NEGATIVE
KETONES UR STRIP-MCNC: NEGATIVE MG/DL
LEUKOCYTE ESTERASE UR QL STRIP: NEGATIVE
NITRITE UR QL STRIP: NEGATIVE
PH UR STRIP.AUTO: 7 [PH]
PROT UR STRIP-MCNC: NEGATIVE MG/DL
SP GR UR STRIP.AUTO: 1.02 (ref 1–1.03)
UROBILINOGEN UR STRIP-ACNC: <2 MG/DL

## 2022-10-06 PROCEDURE — 81003 URINALYSIS AUTO W/O SCOPE: CPT

## 2022-10-06 PROCEDURE — 99214 OFFICE O/P EST MOD 30 MIN: CPT

## 2022-10-06 PROCEDURE — 87086 URINE CULTURE/COLONY COUNT: CPT

## 2022-10-06 NOTE — PROGRESS NOTES
INTERNAL MEDICINE FOLLOW-UP VISIT  [facility]    NAME: Liat Payne  AGE: 40 y o  SEX: male  : 1985     DATE: 10/6/2022     Assessment and Plan:   1  Dysuria  Frequency, urgency  No pain  Itching to the urethral opening with irritation to skin  Wife with a recent UTI as well as candiasis albicans infection  Has been sexually intimate with wife during this time  Will obtain urine to rule out bacteria urine and bladder  Consider treatment for candidiasis due to contact  Patient wanted to defer STI testing at this time as he states he is low to no risk of this  Will revisit this if current testing is negative  - UA (URINE) with reflex to Scope  - Urine culture; Future    2  Post-nasal drip  Two weeks of postnasal drip, congestion  No fever  Was ordered Flonase 2 week ago but did not start  Flonase, picking up today  Two sprays in each nostril daily  Daily OTC Zyrtec, Claritin, or allegra daily        No follow-ups on file  Chief Complaint:     Chief Complaint   Patient presents with    Follow-up     Yeast infection      History of Present Illness:     Patient presents today for complaints of irritation to the tip of his penis  He states this is been going on for about a week or 2  He admits that his wife recently had a UTI as well as a yeast infection and he has been sexually intimate with her during that time  He denies any burning with urination however he does state that he is going frequent and has urgency  He denies any fevers, flank pain, or any other symptoms at this time  He was also recently seen at the urgent care due to congestion and nonproductive cough  He states this was about 2 weeks ago he was prescribed Flonase however he did not start this yet      The following portions of the patient's history were reviewed and updated as appropriate: allergies, current medications, past family history, past medical history, past social history, past surgical history and problem list      Review of Systems:     Review of Systems   Constitutional: Negative for appetite change, chills, diaphoresis, fatigue, fever and unexpected weight change  HENT: Negative for postnasal drip and sneezing  Eyes: Negative for visual disturbance  Respiratory: Negative for chest tightness and shortness of breath  Cardiovascular: Negative for chest pain, palpitations and leg swelling  Gastrointestinal: Negative for abdominal pain and blood in stool  Endocrine: Negative for cold intolerance, heat intolerance, polydipsia, polyphagia and polyuria  Genitourinary: Positive for frequency and urgency  Negative for difficulty urinating and dysuria  Musculoskeletal: Negative for arthralgias and myalgias  Skin: Positive for rash  Negative for wound  Neurological: Negative for dizziness, weakness, light-headedness and headaches  Hematological: Negative for adenopathy  Psychiatric/Behavioral: Negative for confusion, dysphoric mood and sleep disturbance  The patient is not nervous/anxious  Past Medical History:     Past Medical History:   Diagnosis Date    Anal abscess 3/12/2019    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    GERD (gastroesophageal reflux disease)         Current Medications:     Current Outpatient Medications:     benzonatate (TESSALON PERLES) 100 mg capsule, Take 1 capsule (100 mg total) by mouth 3 (three) times a day as needed for cough (Patient not taking: Reported on 10/6/2022), Disp: 60 capsule, Rfl: 1    fluticasone (FLONASE) 50 mcg/act nasal spray, 2 sprays into each nostril daily (Patient not taking: Reported on 10/6/2022), Disp: 16 g, Rfl: 3    sucralfate (CARAFATE) 1 g tablet, Take 1 tablet (1 g total) by mouth 4 (four) times a day Crush tablet and mix with 10 ML water to make a slurry   (Patient not taking: Reported on 10/6/2022), Disp: 120 tablet, Rfl: 0     Allergies:   No Known Allergies     Physical Exam:     /72 (BP Location: Left arm, Patient Position: Sitting, Cuff Size: Large)   Pulse 95   Temp 97 9 °F (36 6 °C) (Temporal) Comment: no nsaids  Resp 17   Ht 5' 11" (1 803 m)   Wt 94 2 kg (207 lb 9 6 oz) Comment: with shoes on  SpO2 98%   BMI 28 95 kg/m²     Physical Exam  Constitutional:       Appearance: He is well-developed  HENT:      Head: Normocephalic and atraumatic  Eyes:      Conjunctiva/sclera: Conjunctivae normal       Pupils: Pupils are equal, round, and reactive to light  Cardiovascular:      Rate and Rhythm: Normal rate and regular rhythm  Heart sounds: Normal heart sounds  Pulmonary:      Effort: Pulmonary effort is normal       Breath sounds: Normal breath sounds  Abdominal:      General: Bowel sounds are normal       Palpations: Abdomen is soft  Musculoskeletal:         General: Normal range of motion  Cervical back: Normal range of motion  Skin:     General: Skin is warm and dry  Comments: Irritation and mild erythema to carlos urethra area   Neurological:      Mental Status: He is alert and oriented to person, place, and time  Data:     Laboratory Results: I have personally reviewed the pertinent laboratory results/reports   Radiology/Other Diagnostic Testing Results: I have personally reviewed pertinent reports        [id number] [facility]

## 2022-10-07 DIAGNOSIS — N48.1 BALANITIS: Primary | ICD-10-CM

## 2022-10-07 LAB — BACTERIA UR CULT: NORMAL

## 2022-10-07 RX ORDER — CLOTRIMAZOLE 1 %
CREAM (GRAM) TOPICAL 2 TIMES DAILY
Qty: 30 G | Refills: 0 | Status: SHIPPED | OUTPATIENT
Start: 2022-10-07 | End: 2022-10-21

## 2022-11-11 ENCOUNTER — TELEPHONE (OUTPATIENT)
Dept: GASTROENTEROLOGY | Facility: CLINIC | Age: 37
End: 2022-11-11

## 2022-11-12 ENCOUNTER — APPOINTMENT (EMERGENCY)
Dept: CT IMAGING | Facility: HOSPITAL | Age: 37
End: 2022-11-12

## 2022-11-12 ENCOUNTER — HOSPITAL ENCOUNTER (EMERGENCY)
Facility: HOSPITAL | Age: 37
Discharge: HOME | End: 2022-11-12
Attending: EMERGENCY MEDICINE

## 2022-11-12 VITALS
OXYGEN SATURATION: 99 % | DIASTOLIC BLOOD PRESSURE: 54 MMHG | RESPIRATION RATE: 16 BRPM | SYSTOLIC BLOOD PRESSURE: 99 MMHG | TEMPERATURE: 98.6 F | HEART RATE: 77 BPM

## 2022-11-12 DIAGNOSIS — R10.33 PERIUMBILICAL ABDOMINAL PAIN: ICD-10-CM

## 2022-11-12 DIAGNOSIS — L08.82 OMPHALITIS IN ADULT: Primary | ICD-10-CM

## 2022-11-12 DIAGNOSIS — N39.43 URINARY DRIBBLING: ICD-10-CM

## 2022-11-12 LAB
ALBUMIN SERPL BCP-MCNC: 3.9 G/DL (ref 3.5–5)
ALP SERPL-CCNC: 92 U/L (ref 46–116)
ALT SERPL W P-5'-P-CCNC: 29 U/L (ref 12–78)
ANION GAP SERPL CALCULATED.3IONS-SCNC: 9 MMOL/L (ref 4–13)
AST SERPL W P-5'-P-CCNC: 17 U/L (ref 5–45)
BASOPHILS # BLD AUTO: 0.05 THOUSANDS/ÂΜL (ref 0–0.1)
BASOPHILS NFR BLD AUTO: 1 % (ref 0–1)
BILIRUB DIRECT SERPL-MCNC: 0.08 MG/DL (ref 0–0.2)
BILIRUB SERPL-MCNC: 0.28 MG/DL (ref 0.2–1)
BILIRUB UR QL STRIP: NEGATIVE
BUN SERPL-MCNC: 11 MG/DL (ref 5–25)
CALCIUM SERPL-MCNC: 9.3 MG/DL (ref 8.3–10.1)
CHLORIDE SERPL-SCNC: 105 MMOL/L (ref 96–108)
CLARITY UR: CLEAR
CO2 SERPL-SCNC: 26 MMOL/L (ref 21–32)
COLOR UR: YELLOW
CREAT SERPL-MCNC: 0.76 MG/DL (ref 0.6–1.3)
EOSINOPHIL # BLD AUTO: 0.27 THOUSAND/ÂΜL (ref 0–0.61)
EOSINOPHIL NFR BLD AUTO: 3 % (ref 0–6)
ERYTHROCYTE [DISTWIDTH] IN BLOOD BY AUTOMATED COUNT: 12.7 % (ref 11.6–15.1)
FLUAV RNA RESP QL NAA+PROBE: NEGATIVE
FLUBV RNA RESP QL NAA+PROBE: NEGATIVE
GFR SERPL CREATININE-BSD FRML MDRD: 117 ML/MIN/1.73SQ M
GLUCOSE SERPL-MCNC: 88 MG/DL (ref 65–140)
GLUCOSE UR STRIP-MCNC: NEGATIVE MG/DL
HCT VFR BLD AUTO: 43.6 % (ref 36.5–49.3)
HGB BLD-MCNC: 14.9 G/DL (ref 12–17)
HGB UR QL STRIP.AUTO: NEGATIVE
IMM GRANULOCYTES # BLD AUTO: 0.03 THOUSAND/UL (ref 0–0.2)
IMM GRANULOCYTES NFR BLD AUTO: 0 % (ref 0–2)
KETONES UR STRIP-MCNC: NEGATIVE MG/DL
LACTATE SERPL-SCNC: 1 MMOL/L (ref 0.5–2)
LEUKOCYTE ESTERASE UR QL STRIP: NEGATIVE
LIPASE SERPL-CCNC: 60 U/L (ref 73–393)
LYMPHOCYTES # BLD AUTO: 2.79 THOUSANDS/ÂΜL (ref 0.6–4.47)
LYMPHOCYTES NFR BLD AUTO: 27 % (ref 14–44)
MCH RBC QN AUTO: 32.9 PG (ref 26.8–34.3)
MCHC RBC AUTO-ENTMCNC: 34.2 G/DL (ref 31.4–37.4)
MCV RBC AUTO: 96 FL (ref 82–98)
MONOCYTES # BLD AUTO: 0.75 THOUSAND/ÂΜL (ref 0.17–1.22)
MONOCYTES NFR BLD AUTO: 7 % (ref 4–12)
NEUTROPHILS # BLD AUTO: 6.47 THOUSANDS/ÂΜL (ref 1.85–7.62)
NEUTS SEG NFR BLD AUTO: 62 % (ref 43–75)
NITRITE UR QL STRIP: NEGATIVE
NRBC BLD AUTO-RTO: 0 /100 WBCS
PH UR STRIP.AUTO: 6 [PH]
PLATELET # BLD AUTO: 284 THOUSANDS/UL (ref 149–390)
PMV BLD AUTO: 9.6 FL (ref 8.9–12.7)
POTASSIUM SERPL-SCNC: 4.3 MMOL/L (ref 3.5–5.3)
PROT SERPL-MCNC: 7.2 G/DL (ref 6.4–8.4)
PROT UR STRIP-MCNC: NEGATIVE MG/DL
RBC # BLD AUTO: 4.53 MILLION/UL (ref 3.88–5.62)
RSV RNA RESP QL NAA+PROBE: NEGATIVE
SARS-COV-2 RNA RESP QL NAA+PROBE: NEGATIVE
SODIUM SERPL-SCNC: 140 MMOL/L (ref 135–147)
SP GR UR STRIP.AUTO: 1.03 (ref 1–1.03)
UROBILINOGEN UR STRIP-ACNC: <2 MG/DL
WBC # BLD AUTO: 10.36 THOUSAND/UL (ref 4.31–10.16)

## 2022-11-12 RX ORDER — NAPROXEN 500 MG/1
500 TABLET ORAL EVERY 12 HOURS PRN
Qty: 20 TABLET | Refills: 0 | Status: SHIPPED | OUTPATIENT
Start: 2022-11-12

## 2022-11-12 RX ORDER — DOXYCYCLINE HYCLATE 100 MG/1
100 CAPSULE ORAL ONCE
Status: COMPLETED | OUTPATIENT
Start: 2022-11-12 | End: 2022-11-12

## 2022-11-12 RX ORDER — DICYCLOMINE HCL 20 MG
20 TABLET ORAL ONCE
Status: COMPLETED | OUTPATIENT
Start: 2022-11-12 | End: 2022-11-12

## 2022-11-12 RX ORDER — DOXYCYCLINE HYCLATE 100 MG/1
100 CAPSULE ORAL 2 TIMES DAILY
Qty: 20 CAPSULE | Refills: 0 | Status: SHIPPED | OUTPATIENT
Start: 2022-11-12 | End: 2022-11-22

## 2022-11-12 RX ORDER — KETOROLAC TROMETHAMINE 30 MG/ML
15 INJECTION, SOLUTION INTRAMUSCULAR; INTRAVENOUS ONCE
Status: COMPLETED | OUTPATIENT
Start: 2022-11-12 | End: 2022-11-12

## 2022-11-12 RX ADMIN — DICYCLOMINE HYDROCHLORIDE 20 MG: 20 TABLET ORAL at 12:27

## 2022-11-12 RX ADMIN — DOXYCYCLINE 100 MG: 100 CAPSULE ORAL at 15:15

## 2022-11-12 RX ADMIN — SODIUM CHLORIDE 1000 ML: 0.9 INJECTION, SOLUTION INTRAVENOUS at 12:26

## 2022-11-12 RX ADMIN — IOHEXOL 100 ML: 350 INJECTION, SOLUTION INTRAVENOUS at 13:43

## 2022-11-12 RX ADMIN — KETOROLAC TROMETHAMINE 15 MG: 30 INJECTION, SOLUTION INTRAMUSCULAR at 12:27

## 2022-11-12 NOTE — ED PROVIDER NOTES
History  Chief Complaint   Patient presents with   • Abdominal Pain     Patient co pain around belly button and discharge  Patient also states "my urine smells like feces and is very dark " Symptoms started 2-3 weeks ago  Patient is a 50-year-old male with past medical history of GERD, esophagitis, prior very rectal and anal abscess requiring surgical drainage, anal fistula, presents to the emergency department with multiple complaints  Patient states for the past 2 days he has had pain around his umbilicus that is been fairly constant, sharp in nature and nonradiating  He states he does feel it somewhat in the left lower abdomen as well  He states he does sometimes get abscesses near the umbilicus that have drained in the past   He has not seen any drainage from the belly button thus far but has noticed some redness above the belly button, 1st noticed today  In addition, for the past 2 weeks patient has had some difficulty urinating in which she will dribble when trying to initiate stream and has a difficult time keeping the stream flowing regularly  He also reports increased urgency to pee and has noticed a foul-smell to the urine that resembles the smell of feces  He also reports at times his urine is very dark brown in color  Patient concerned about a fistula  He reports having had a fever within the past 1 week but only checked his temperature once and it was 101° F  Denies any fever or chills today  On review of systems, he does report alternating diarrhea and constipation  Denies any blood per rectum or melena  He denies any headache, dizziness or near syncope, cough, URI symptoms, chest pain, palpitations, dyspnea, abdominal distension, nausea, vomiting, dysuria, gross hematuria, flank pain, focal neurologic deficits  Denies history of sexually transmitted infection  Patient is sexually active with his wife only  Patient states he does follow with GI for his GERD and esophagitis    Patient has had prior EGD before and his GI doctor thought he might have Crohn's disease but the biopsies were negative per patient  He does have an upcoming EGD in December  History provided by:  Patient   used: No    Abdominal Pain  Associated symptoms: constipation and diarrhea    Associated symptoms: no chest pain, no chills, no cough, no dysuria, no fever, no hematuria, no nausea, no shortness of breath, no sore throat and no vomiting        Prior to Admission Medications   Prescriptions Last Dose Informant Patient Reported? Taking?   benzonatate (TESSALON PERLES) 100 mg capsule  Self No No   Sig: Take 1 capsule (100 mg total) by mouth 3 (three) times a day as needed for cough   Patient not taking: Reported on 10/6/2022   clotrimazole (LOTRIMIN) 1 % cream   No No   Sig: Apply topically 2 (two) times a day for 14 days Apply to affected area, external only  fluticasone (FLONASE) 50 mcg/act nasal spray  Self No No   Si sprays into each nostril daily   Patient not taking: Reported on 10/6/2022   sucralfate (CARAFATE) 1 g tablet   No No   Sig: Take 1 tablet (1 g total) by mouth 4 (four) times a day Crush tablet and mix with 10 ML water to make a slurry  Patient not taking: Reported on 10/6/2022      Facility-Administered Medications: None       Past Medical History:   Diagnosis Date   • Anal abscess 3/12/2019   • Anal fistula 2019   • GERD (gastroesophageal reflux disease)        Past Surgical History:   Procedure Laterality Date   • ABCESS DRAINAGE  10/29/2018    carlos anal   • ANAL FISTULOTOMY N/A 3/14/2019    Procedure: FISTULOTOMY;  Surgeon: Iman Ortega MD;  [facility];  Service: Colorectal   • COLONOSCOPY     • NC COLONOSCOPY FLX DX W/COLLJ SPEC WHEN PFRMD N/A 2018    Procedure: COLONOSCOPY;  Surgeon: Albina Deshpande MD;  Location: [facility];   Service: Gastroenterology   • NC ESOPHAGOGASTRODUODENOSCOPY TRANSORAL DIAGNOSTIC N/A 2018    Procedure: ESOPHAGOGASTRODUODENOSCOPY (EGD); Surgeon: Aleja Vaughan MD;  Location[facility]; Service: Gastroenterology   • MT ESOPHAGOGASTRODUODENOSCOPY TRANSORAL DIAGNOSTIC N/A 2/7/2019    Procedure: ESOPHAGOGASTRODUODENOSCOPY (EGD); Surgeon: Aleja Vaughan MD;  [facility]; Service: Gastroenterology   • MT LAMNOTMY INCL W/DCMPRSN NRV ROOT 1 INTRSPC LUMBR Left 11/22/2019    Procedure: LAMINECTOMY LUMBAR/THORACIC; L4/5 microdiscectomy;  Surgeon: Bryanna Saeed MD;  Location:  MAIN OR;  Service: Neurosurgery   • TONSILLECTOMY         Family History   Problem Relation Age of Onset   • Diabetes Father      I have reviewed and agree with the history as documented  E-Cigarette/Vaping   • E-Cigarette Use Never User      E-Cigarette/Vaping Substances   • Nicotine No    • THC No    • CBD No    • Flavoring No    • Other No    • Unknown No      Social History     Tobacco Use   • Smoking status: Current Every Day Smoker     Packs/day: 1 00     Years: 15 00     Pack years: 15 00     Types: Cigarettes   • Smokeless tobacco: Never Used   Vaping Use   • Vaping Use: Never used   Substance Use Topics   • Alcohol use: Yes     Comment: very rarely   • Drug use: Not Currently     Frequency: 2 0 times per week     Types: Marijuana     Comment: none recently       Review of Systems   Constitutional: Negative for chills and fever  HENT: Negative for congestion, ear pain, rhinorrhea and sore throat  Respiratory: Negative for cough, chest tightness, shortness of breath and wheezing  Cardiovascular: Negative for chest pain and palpitations  Gastrointestinal: Positive for abdominal pain, constipation and diarrhea  Negative for abdominal distention, blood in stool, nausea and vomiting  Genitourinary: Positive for difficulty urinating and urgency  Negative for dysuria, flank pain, frequency, genital sores, hematuria, penile discharge, penile pain, penile swelling, scrotal swelling and testicular pain  +Foul-smelling urine (like feces)   Musculoskeletal: Negative for back pain, neck pain and neck stiffness  Skin: Negative for color change, pallor, rash and wound  Allergic/Immunologic: Negative for immunocompromised state  Neurological: Negative for dizziness, weakness, light-headedness, numbness and headaches  Hematological: Negative for adenopathy  Does not bruise/bleed easily  Psychiatric/Behavioral: Negative for confusion and decreased concentration  All other systems reviewed and are negative  Physical Exam  Physical Exam  Vitals and nursing note reviewed  Constitutional:       General: He is not in acute distress  Appearance: Normal appearance  He is well-developed  He is not ill-appearing, toxic-appearing or diaphoretic  HENT:      Head: Normocephalic and atraumatic  Right Ear: External ear normal       Left Ear: External ear normal       Nose: Nose normal       Mouth/Throat:      Comments: Orpharyngeal exam deferred at this time due to risk of exposure to COVID-19 during current pandemic  Patient has no oropharyngeal complaints  Eyes:      Extraocular Movements: Extraocular movements intact  Conjunctiva/sclera: Conjunctivae normal    Neck:      Vascular: No JVD  Cardiovascular:      Rate and Rhythm: Normal rate and regular rhythm  Pulses: Normal pulses  Heart sounds: Normal heart sounds  No murmur heard  No friction rub  No gallop  Pulmonary:      Effort: Pulmonary effort is normal  No respiratory distress  Breath sounds: Normal breath sounds  No wheezing, rhonchi or rales  Abdominal:      General: There is no distension  Palpations: Abdomen is soft  Tenderness: There is abdominal tenderness  There is no right CVA tenderness, left CVA tenderness, guarding or rebound  Comments: Mild diffuse abdominal tenderness, most significant in the periumbilical region  No umbilical or obvious inguinal hernia   Mild erythema just above the umbilical   No skin induration or fluctuance  Genitourinary:     Penis: Normal        Testes: Normal       Comments: Penis is circumcised, appears normal and nontender  Bilateral testes normal with normal cremasteric reflexes  No testicular erythema, edema, tenderness  Perineum unremarkable without any induration, erythema or fluctuance  Musculoskeletal:         General: No swelling or tenderness  Normal range of motion  Cervical back: Normal range of motion and neck supple  No rigidity  Skin:     General: Skin is warm and dry  Coloration: Skin is not pale  Findings: No erythema or rash  Neurological:      General: No focal deficit present  Mental Status: He is alert and oriented to person, place, and time  Sensory: No sensory deficit  Motor: No weakness     Psychiatric:         Mood and Affect: Mood normal          Behavior: Behavior normal          Vital Signs  ED Triage Vitals   Temperature Pulse Respirations Blood Pressure SpO2   11/12/22 1053 11/12/22 1053 11/12/22 1053 11/12/22 1053 11/12/22 1053   98 6 °F (37 °C) 92 16 136/60 97 %      Temp Source Heart Rate Source Patient Position - Orthostatic VS BP Location FiO2 (%)   11/12/22 1053 11/12/22 1053 11/12/22 1053 11/12/22 1053 --   Tympanic Monitor Sitting Left arm       Pain Score       11/12/22 1227       7         Vitals:    11/12/22 1053 11/12/22 1330 11/12/22 1400 11/12/22 1430   BP: 136/60 118/59 110/51 102/50   BP Location: Left arm      Pulse: 92 (!) 50 57 62   Resp: 16 16     Temp: 98 6 °F (37 °C)      TempSrc: Tympanic      SpO2: 97% 97% 98% 96%       Visual Acuity      ED Medications  Medications   sodium chloride 0 9 % bolus 1,000 mL (0 mL Intravenous Stopped 11/12/22 1516)   ketorolac (TORADOL) injection 15 mg (15 mg Intravenous Given 11/12/22 1227)   dicyclomine (BENTYL) tablet 20 mg (20 mg Oral Given 11/12/22 1227)   iohexol (OMNIPAQUE) 350 MG/ML injection (SINGLE-DOSE) 100 mL (100 mL Intravenous Given 11/12/22 1343)   doxycycline hyclate (VIBRAMYCIN) capsule 100 mg (100 mg Oral Given 11/12/22 1515)       Diagnostic Studies  Results Reviewed     Procedure Component Value Units Date/Time    FLU/RSV/COVID - if FLU/RSV clinically relevant [[id number]]  (Normal) Collected: 11/12/22 1517    Lab Status: Final result Specimen: Nares from Nose Updated: 11/12/22 1609     SARS-CoV-2 Negative     INFLUENZA A PCR Negative     INFLUENZA B PCR Negative     RSV PCR Negative    Narrative:      FOR PEDIATRIC PATIENTS - copy/paste COVID Guidelines URL to browser: [email or web address]  AWAKx    SARS-CoV-2 assay is a Nucleic Acid Amplification assay intended for the  qualitative detection of nucleic acid from SARS-CoV-2 in nasopharyngeal  swabs  Results are for the presumptive identification of SARS-CoV-2 RNA  Positive results are indicative of infection with SARS-CoV-2, the virus  causing COVID-19, but do not rule out bacterial infection or co-infection  with other viruses  Laboratories within the [place] and its  territories are required to report all positive results to the appropriate  public health authorities  Negative results do not preclude SARS-CoV-2  infection and should not be used as the sole basis for treatment or other  patient management decisions  Negative results must be combined with  clinical observations, patient history, and epidemiological information  This test has not been FDA cleared or approved  This test has been authorized by FDA under an Emergency Use Authorization  (EUA)  This test is only authorized for the duration of time the  declaration that circumstances exist justifying the authorization of the  emergency use of an in vitro diagnostic tests for detection of SARS-CoV-2  virus and/or diagnosis of COVID-19 infection under section 564(b)(1) of  the Act, 21 U  S C  874LSQ-3(O)(8), unless the authorization is terminated  or revoked sooner   The test has been validated but independent review by FDA  and CLIA is pending  Test performed using [employer] GeneXpert: This RT-PCR assay targets N2,  a region unique to SARS-CoV-2  A conserved region in the E-gene was chosen  for pan-Sarbecovirus detection which includes SARS-CoV-2  According to CMS-2020-01-R, this platform meets the definition of high-throughput technology  Lactic acid [[id number]]  (Normal) Collected: 11/12/22 1230    Lab Status: Final result Specimen: Blood from Arm, Left Updated: 11/12/22 1306     LACTIC ACID 1 0 mmol/L     Narrative:      Result may be elevated if tourniquet was used during collection      Basic metabolic panel [[id number]] Collected: 11/12/22 1230    Lab Status: Final result Specimen: Blood from Arm, Left Updated: 11/12/22 1306     Sodium 140 mmol/L      Potassium 4 3 mmol/L      Chloride 105 mmol/L      CO2 26 mmol/L      ANION GAP 9 mmol/L      BUN 11 mg/dL      Creatinine 0 76 mg/dL      Glucose 88 mg/dL      Calcium 9 3 mg/dL      eGFR 117 ml/min/1 73sq m     Narrative:      [facility] guidelines for Chronic Kidney Disease (CKD):   •  Stage 1 with normal or high GFR (GFR > 90 mL/min/1 73 square meters)  •  Stage 2 Mild CKD (GFR = 60-89 mL/min/1 73 square meters)  •  Stage 3A Moderate CKD (GFR = 45-59 mL/min/1 73 square meters)  •  Stage 3B Moderate CKD (GFR = 30-44 mL/min/1 73 square meters)  •  Stage 4 Severe CKD (GFR = 15-29 mL/min/1 73 square meters)  •  Stage 5 End Stage CKD (GFR <15 mL/min/1 73 square meters)  Note: GFR calculation is accurate only with a steady state creatinine    Hepatic function panel [[id number]]  (Normal) Collected: 11/12/22 1230    Lab Status: Final result Specimen: Blood from Arm, Left Updated: 11/12/22 1306     Total Bilirubin 0 28 mg/dL      Bilirubin, Direct 0 08 mg/dL      Alkaline Phosphatase 92 U/L      AST 17 U/L      ALT 29 U/L      Total Protein 7 2 g/dL      Albumin 3 9 g/dL     Lipase [[id number]]  (Abnormal) Collected: 11/12/22 1230    Lab Status: Final result Specimen: Blood from Arm, Left Updated: 11/12/22 1306     Lipase 60 u/L     Chlamydia/GC amplified DNA by PCR [[id number]] Collected: 11/12/22 1253    Lab Status: In process Updated: 11/12/22 1253    UA (URINE) with reflex to Scope [[id number]] Collected: 11/12/22 1230    Lab Status: Final result Specimen: Urine, Clean Catch Updated: 11/12/22 1241     Color, UA Yellow     Clarity, UA Clear     Specific Gravity, UA 1 027     pH, UA 6 0     Leukocytes, UA Negative     Nitrite, UA Negative     Protein, UA Negative mg/dl      Glucose, UA Negative mg/dl      Ketones, UA Negative mg/dl      Urobilinogen, UA <2 0 mg/dl      Bilirubin, UA Negative     Occult Blood, UA Negative    CBC and differential [[id number]]  (Abnormal) Collected: 11/12/22 1230    Lab Status: Final result Specimen: Blood from Arm, Left Updated: 11/12/22 1239     WBC 10 36 Thousand/uL      RBC 4 53 Million/uL      Hemoglobin 14 9 g/dL      Hematocrit 43 6 %      MCV 96 fL      MCH 32 9 pg      MCHC 34 2 g/dL      RDW 12 7 %      MPV 9 6 fL      Platelets 646 Thousands/uL      nRBC 0 /100 WBCs      Neutrophils Relative 62 %      Immat GRANS % 0 %      Lymphocytes Relative 27 %      Monocytes Relative 7 %      Eosinophils Relative 3 %      Basophils Relative 1 %      Neutrophils Absolute 6 47 Thousands/µL      Immature Grans Absolute 0 03 Thousand/uL      Lymphocytes Absolute 2 79 Thousands/µL      Monocytes Absolute 0 75 Thousand/µL      Eosinophils Absolute 0 27 Thousand/µL      Basophils Absolute 0 05 Thousands/µL     Urine culture [[id number]] Collected: 11/12/22 1231    Lab Status: In process Specimen: Urine, Clean Catch Updated: 11/12/22 1237                 CT abdomen pelvis with contrast   Final Result by Madhu Dalal MD (11/12 1501)      Mild inflammatory changes around the umbilicus, raises concern omphalitis          No drainable fluid collection seen      Mild thickening of the cecum and terminal ileum may be due to colitis      No bowel obstruction      Small hypodense liver lesions with largest measuring 1 cm in the left hepatic lobe, incompletely characterized  Suggest nonemergent evaluation with the MRI, these may be small hemangiomas or cysts  However one of the lesions in the segment 2 has become    larger, would suggest definite characterization      Bibasilar ground glass density seen may be due to atelectasis or infiltrate, consider evaluation to exclude Covid      No evidence of diverticulitis          I personally discussed this study with Tania Nur on 11/12/2022 at 2:54 PM                Workstation performed: [id number]                    Procedures  Procedures         ED Course  ED Course as of 11/12/22 1636   Sat Nov 12, 2022   1244 Leukocytes, UA: Negative   1245 Nitrite, UA: Negative   1245 Blood, UA: Negative   1320 Labs and UA unremarkable  1517 Updated patient of results thus far and plan to start patient on doxycycline and test for COVID/flu/RSV given CT chest findings  Patient does report he was sick with cough and cold symptoms for about 3 weeks however he is not having those symptoms now  This could be residual pneumonia from recent illness  Doxycycline will cover for pneumonia in addition to the omphalitis/soft tissue infection  1522 I did update patient about his incidental liver lesion for which outpatient MRI and GI follow-up recommended  Patient already follows with Dr Sonia Christine and has an appointment in December with him  1628 Patient's COVID/flu swab for is negative  Given normal white count, normal lactic acid, only mild inflammatory changes seen on CT, I feel oral antibiotics as outpatient is appropriate at this time to treat umbilical cellulitis/omphalitis  Discussed ED return parameters and need for close GI and urology follow-up                                               MDM  Number of Diagnoses or Management Options  Diagnosis management comments: 28-year-old male with history of perirectal an anal abscess/fistula in the past, history of gastritis and esophagitis, presents to the ED for acute periumbilical abdominal pain migrating to the left lower quadrant as well as several weeks of foul-smelling urine and dark colored urine concerning for fistula  Patient has no umbilical hernia or umbilical hernia there is mild erythema around the umbilicus  He does have tenderness diffusely in the abdomen and will pursue CT imaging of the abdomen and pelvis with IV contrast   In regards to the urinary complaints, will check urinalysis, culture and urine gonorrhea/chlamydia  Urine currently at bedside and appears yellow in color and normal  Will give IVF, toradol and bentyl for symptom relief  Amount and/or Complexity of Data Reviewed  Clinical lab tests: ordered and reviewed  Tests in the radiology section of CPT®: ordered and reviewed  Independent visualization of images, tracings, or specimens: yes        Disposition  Final diagnoses:   Omphalitis in adult   Periumbilical abdominal pain   Urinary dribbling     Time reflects when diagnosis was documented in both MDM as applicable and the Disposition within this note     Time User Action Codes Description Comment    11/12/2022  4:22 PM Onnie Colace E Add [L08 82] Omphalitis in adult     11/12/2022  4:23 PM Onnie Colace E Add [W78 84] Periumbilical abdominal pain     11/12/2022  4:24 PM Renata Collins Add [N39 43] Urinary dribbling       ED Disposition     ED Disposition   Discharge    Condition   Stable    Date/Time   Sat Nov 12, 2022  4:24 PM    Comment   Seth Hayward discharge to home/self care                 Follow-up Information     Follow up With Specialties Details Why Contact Info Additional [address] [facility],  Internal Medicine Schedule an appointment as soon as possible for a visit   [address]  [facility] [id number] [facility] Pelon Hdoge MD Gastroenterology Schedule an appointment as soon as possible for a visit   [address] [facility] Urology Schedule an appointment as soon as possible for a visit   [address]  [address] [facility] [address] [address]  [address]  [facility], [address], [address], [facility], [address] [facility], 59[address] [facility] Emergency Department Emergency Medicine Go to  If symptoms worsen 34 [facility] 109 [facility] Emergency Department, 36 [facility], [address] [facility], [address]          Patient's Medications   Discharge Prescriptions    DOXYCYCLINE HYCLATE (VIBRAMYCIN) 100 MG CAPSULE    Take 1 capsule (100 mg total) by mouth 2 (two) times a day for 10 days       Start Date: 11/12/2022End Date: 11/22/2022       Order Dose: 100 mg       Quantity: 20 capsule    Refills: 0    NAPROXEN (NAPROSYN) 500 MG TABLET    Take 1 tablet (500 mg total) by mouth every 12 (twelve) hours as needed for mild pain or moderate pain       Start Date: 11/12/2022End Date: --       Order Dose: 500 mg       Quantity: 20 tablet    Refills: 0       No discharge procedures on file      PDMP Review     None          ED Provider  Electronically Signed by           Roman Rowell DO  11/12/22 9616

## 2022-11-14 LAB
BACTERIA UR CULT: NORMAL
C TRACH DNA SPEC QL NAA+PROBE: NEGATIVE
N GONORRHOEA DNA SPEC QL NAA+PROBE: NEGATIVE

## 2022-11-15 ENCOUNTER — TELEPHONE (OUTPATIENT)
Dept: UROLOGY | Facility: MEDICAL CENTER | Age: 37
End: 2022-11-15

## 2022-11-16 NOTE — TELEPHONE ENCOUNTER
Called patient back and confirmed original appt given for 12/8/22
Called patient left message to call us back to further triage patient
Next avail is fine
Patient returning call  Patient is able to urinate, but having dribbling at the end and will stop and go and then feel he has to go more  He stated last week his urine was very dark and smelled like feces      Patient can be reached at [phone]
Please Triage  New Patient    What is the reason for the patient’s appointment? Pt's wife called the office to schedule a NP appt for ER follow up  Pt had diff urinating  Pt was told he has prostate issue  What office location does the patient prefer? [place]   Imaging/Lab Results: in epic     Do we accept the patient's insurance or is the patient Self-Pay? Insurance Provider: [employer]   Plan Type/Number:  Member ID#: Has the patient had any previous Urologist(s)? no    Have patient records been requested? If not are records showing in Epic: in epic     Has the patient had any outside testing done? In [facility]   Does the patient have a personal history of cancer?   Razia Pierre can be reached at [phone] or [phone]
Returned call to patient  Patient states over the last few weeks reports he had a yeast infection , which he thought resolved  Last few weeks pain in abdomen first morning urination not everyday smells like " feces"  Also reporting takes longer to void, but is voiding, a lot of starting and stopping   Reports really foamy urine   No fevers, chills, nausea or vomiting  Reports ice cold hands and feet intermittently  Also reports issue with foreskin  No rash reported , discharge ,drainage  Foreskin having some issue retracting foreskin over the last month was worse when it first started happening , but has gotten alittle better per patient  Tentive appt given for 12/8/22  Advised patient we would call back if sooner appt was advised  Ed precautions reviewed if unable to retract foreskin or unable to void 
839

## 2022-11-30 RX ORDER — BENZONATATE 200 MG/1
CAPSULE ORAL
COMMUNITY
Start: 2022-10-22 | End: 2022-12-02

## 2022-11-30 RX ORDER — ALBUTEROL SULFATE 90 UG/1
AEROSOL, METERED RESPIRATORY (INHALATION)
COMMUNITY
Start: 2022-10-22 | End: 2022-12-02

## 2022-11-30 RX ORDER — PREDNISONE 20 MG/1
TABLET ORAL
COMMUNITY
Start: 2022-10-22 | End: 2022-12-02

## 2022-12-02 ENCOUNTER — OFFICE VISIT (OUTPATIENT)
Dept: INTERNAL MEDICINE CLINIC | Facility: CLINIC | Age: 37
End: 2022-12-02

## 2022-12-02 VITALS
DIASTOLIC BLOOD PRESSURE: 70 MMHG | SYSTOLIC BLOOD PRESSURE: 112 MMHG | HEIGHT: 71 IN | BODY MASS INDEX: 29.96 KG/M2 | HEART RATE: 74 BPM | OXYGEN SATURATION: 99 % | WEIGHT: 214 LBS

## 2022-12-02 DIAGNOSIS — K76.9 LESION OF LIVER GREATER THAN 1 CM IN DIAMETER: Primary | ICD-10-CM

## 2022-12-02 DIAGNOSIS — G89.29 PERIUMBILICAL PAIN, CHRONIC: ICD-10-CM

## 2022-12-02 DIAGNOSIS — R10.33 PERIUMBILICAL PAIN, CHRONIC: ICD-10-CM

## 2022-12-02 NOTE — PATIENT INSTRUCTIONS
Follow up with gastroenterology  Take medications that gastroenterology prescribed for reflux  Avoid nicotine  Stop THC use    Schedule MRI    GERD (Gastroesophageal Reflux Disease)   AMBULATORY CARE:   Gastroesophageal reflux disease (GERD)  is reflux that happens more than 2 times a week for a few weeks  Reflux means acid and food in your stomach back up into your esophagus  GERD can cause other health problems over time if it is not treated  Common causes of GERD:  GERD often happens because the lower muscle (sphincter) of the esophagus does not close properly  The sphincter normally opens to let food into the stomach  It then closes to keep food and stomach acid in the stomach  If the sphincter does not close properly, stomach acid and food back up (reflux) into the esophagus  The following may increase your risk for GERD:  Certain foods such as spicy foods, chocolate, foods that contain caffeine, peppermint, and fried foods    Hiatal hernia    Certain medicines such as calcium channel blockers (used to treat high blood pressure), allergy medicines, sedatives, or antidepressants    Pregnancy, obesity, or scleroderma    Lying down after a meal    Drinking alcohol or smoking cigarettes    Signs and symptoms:   Heartburn (burning pain in your chest)    Pain after meals that spreads to your neck, jaw, or shoulder    Pain that gets better when you change positions    Bitter or acid taste in your mouth    A dry cough    Trouble swallowing or pain with swallowing    Hoarseness or a sore throat    Burping or hiccups    Feeling full soon after you start eating    Call your local emergency number (911 in the [address] [facility]) if:   You have severe chest pain and sudden trouble breathing  Seek care immediately if:   You have trouble breathing after you vomit  You have trouble swallowing, or pain with swallowing  Your bowel movements are black, bloody, or tarry-looking      Your vomit looks like coffee grounds or has blood in it  Call your doctor or gastroenterologist if:   You feel full and cannot burp or vomit  You vomit large amounts, or you vomit often  You are losing weight without trying  Your symptoms get worse or do not improve with treatment  You have questions or concerns about your condition or care  Treatment for GERD:   Medicines  are used to decrease stomach acid  Medicine may also be used to help your lower esophageal sphincter and stomach contract (tighten) more  Surgery  is done to wrap the upper part of the stomach around the esophageal sphincter  This will strengthen the sphincter and prevent reflux  Manage GERD:       Do not have foods or drinks that may increase heartburn  These include chocolate, peppermint, fried or fatty foods, drinks that contain caffeine, or carbonated drinks (soda)  Other foods include spicy foods, onions, tomatoes, and tomato-based foods  Do not have foods or drinks that can irritate your esophagus, such as citrus fruits, juices, and alcohol  Do not eat large meals  When you eat a lot of food at one time, your stomach needs more acid to digest it  Eat 6 small meals each day instead of 3 large meals, and eat slowly  Do not eat meals 2 to 3 hours before bedtime  Elevate the head of your bed  Place 6-inch blocks under the head of your bed frame  You may also use more than one pillow under your head and shoulders while you sleep  Maintain a healthy weight  If you are overweight, weight loss may help relieve symptoms of GERD  Do not smoke  Smoking weakens the lower esophageal sphincter and increases the risk of GERD  Ask your healthcare provider for information if you currently smoke and need help to quit  E-cigarettes or smokeless tobacco still contain nicotine  Talk to your healthcare provider before you use these products  Do not put pressure on your abdomen  Pressure pushes acid up into your esophagus   Do not wear clothing that is tight around your waist  Do not bend over  Bend at the knees if you need to pick something up  Follow up with your doctor or gastroenterologist as directed:  Write down your questions so you remember to ask them during your visits  © Copyright [employer] 2022 Information is for End User's use only and may not be sold, redistributed or otherwise used for commercial purposes  All illustrations and images included in CareNotes® are the copyrighted property of A D A M [employer] Inc  or Jacqueline Thompson   The above information is an  only  It is not intended as medical advice for individual conditions or treatments  Talk to your doctor, nurse or pharmacist before following any medical regimen to see if it is safe and effective for you

## 2022-12-02 NOTE — PROGRESS NOTES
[facility]    NAME: Karli Linn  AGE: 40 y o  SEX: male  : 1985     DATE: 2022     Assessment and Plan:     Problem List Items Addressed This Visit        Other    Periumbilical pain, chronic     Reports periumbilical pain that is “deep”; no mass hernia palpated  Bowel sounds normal   Nontender on exam          Lesion of liver greater than 1 cm in diameter - Primary     CT abdomen pelvis completed in emergency department showed following    Small hypodense liver lesions with largest measuring 1 cm in the left hepatic lobe, incompletely characterized  Suggest nonemergent evaluation with the MRI, these may be small hemangiomas or cysts  However one of the lesions in the segment 2 has become larger, would suggest definite characterization  MRI ordered  Patient instructed to schedule MRI and follow-up with Gastroenterology           Relevant Orders    MRI abdomen w wo contrast           Return in about 3 months (around 3/2/2023) for Recheck, Annual physical      Chief Complaint:     Chief Complaint   Patient presents with   • Follow-up     ER follow up  History of Present Illness:     Mary Grace Mohan presents to the office today for a follow-up visit  He presented to the emergency department approximately 2 weeks ago with complaints of abdominal pain, difficulty urinating, and redness around his umbilicus  He had a CT scan of the abdomen and pelvis which recommends a non emergent MRI follow-up of hypodense lesions of the liver  He follows with Gastroenterology  He is not taking medications prescribed by Gastroenterology  He reports he is having “a lot of stomach issues”  Patient states he believes he has an abscess in his abdomen  Patient was reassured based on CT scan results no abscess was seen  Patient reports GERD symptoms  Discussed the effects of smoking cigarettes, use of THC and diet on abdominal symptoms    He has appointment with Gastroenterology this month as well as Urology  Review of Systems:     Review of Systems   Constitutional: Negative  HENT: Negative  Respiratory: Negative  Cardiovascular: Negative  Gastrointestinal: Positive for abdominal pain  Negative for diarrhea, nausea and vomiting  Skin: Positive for wound (reoccuring redness/drainage around belly button)  Problem List:     Patient Active Problem List   Diagnosis   • Internal hemorrhoids   • Umbilical bleeding   • History of rectal abscess   • Periumbilical pain, chronic   • Decreased appetite   • Migraine   • Dysphagia   • Lumbar radiculopathy   • Spinal stenosis of lumbar region   • Depression   • Sleep apnea, unspecified   • Dysuria   • Post-nasal drip        Objective:     /70 (BP Location: Left arm, Cuff Size: Standard)   Pulse 74   Ht 5' 11" (1 803 m)   Wt 97 1 kg (214 lb)   SpO2 99%   BMI 29 85 kg/m²     Physical Exam  Vitals and nursing note reviewed  Constitutional:       General: He is not in acute distress  Appearance: Normal appearance  He is well-developed  HENT:      Head: Normocephalic and atraumatic  Right Ear: Tympanic membrane normal       Left Ear: Tympanic membrane normal       Nose: Nose normal       Mouth/Throat:      Mouth: Mucous membranes are moist       Pharynx: Oropharynx is clear  Eyes:      Conjunctiva/sclera: Conjunctivae normal    Cardiovascular:      Rate and Rhythm: Normal rate and regular rhythm  Heart sounds: No murmur heard  Pulmonary:      Effort: Pulmonary effort is normal  No respiratory distress  Breath sounds: Normal breath sounds  No wheezing  Abdominal:      General: Bowel sounds are normal  There is no distension  Palpations: Abdomen is soft  There is no hepatomegaly or mass  Tenderness: There is no abdominal tenderness  There is no right CVA tenderness or left CVA tenderness  Hernia: No hernia is present     Musculoskeletal:         General: No swelling  Cervical back: Neck supple  Lymphadenopathy:      Cervical: No cervical adenopathy  Skin:     General: Skin is warm and dry  Capillary Refill: Capillary refill takes less than 2 seconds  Neurological:      Mental Status: He is alert  Psychiatric:         Mood and Affect: Mood normal          I spent 20 minutes with this patient      [address] [place] [address]  [facility]

## 2022-12-04 PROBLEM — K76.9 LESION OF LIVER GREATER THAN 1 CM IN DIAMETER: Status: ACTIVE | Noted: 2022-12-04

## 2022-12-05 NOTE — ASSESSMENT & PLAN NOTE
Reports periumbilical pain that is “deep”; no mass hernia palpated    Bowel sounds normal   Nontender on exam

## 2022-12-05 NOTE — ASSESSMENT & PLAN NOTE
CT abdomen pelvis completed in emergency department showed following    Small hypodense liver lesions with largest measuring 1 cm in the left hepatic lobe, incompletely characterized  Suggest nonemergent evaluation with the MRI, these may be small hemangiomas or cysts  However one of the lesions in the segment 2 has become larger, would suggest definite characterization  MRI ordered    Patient instructed to schedule MRI and follow-up with Gastroenterology

## 2022-12-05 NOTE — PROGRESS NOTES
12/8/2022      Chief Complaint   Patient presents with   • Difficulty Urinating         Assessment and Plan    40 y o  male -- New patient    1  Difficulty urinating  - UA today negative for leukocytes, nitrites, blood  - PVR today 22 mL  - Discussed conservative measures with adequate hydration, avoidance of bladder irritants, avoidance of constipation   - Return for cystoscopy to rule out stricture given split urinary stream and decreased stream  - call with any questions or concerns in the     2  Acute bacterial prostatitis  - ROGERIO today showing boggy and tender prostate  - Will treat for presumed prostatitis  Ciprofloxacin sent to pharmacy x 14 days      History of Present Illness  Junaid Lagos is a 40 y o  male new patient here for evaluation of difficulty urinating  Patient was seen in the emergency department 11/12/2022 for difficulty urinating  States that he does have a strong stream, then decreases and drips and then stopped altogether  States he feels as if he is not completely emptying bladder  States he is dribbling with urination  Urine culture at that time was negative for infection  Patient had gonorrhea and Chlamydia testing which was negative  States he has split urinary stream, weakened urinary stream  Denies ever seeing urology in the past   Denies prior  surgical manipulation  Denies seeing gross hematuria or fecal matter in the urine  States he drinks water throughout the day  States he previously had perianal fistula and abscess 5 years ago  States he was circumcised  Denies seeing urology in the past      Review of Systems   Constitutional: Negative for activity change, appetite change, chills and fever  HENT: Negative for congestion and trouble swallowing  Respiratory: Negative for cough and shortness of breath  Cardiovascular: Negative for chest pain, palpitations and leg swelling     Gastrointestinal: Negative for abdominal pain, constipation, diarrhea, nausea and vomiting  Genitourinary: Positive for difficulty urinating  Negative for dysuria, flank pain, frequency, hematuria and urgency  Musculoskeletal: Negative for back pain and gait problem  Skin: Negative for wound  Allergic/Immunologic: Negative for immunocompromised state  Neurological: Negative for dizziness and syncope  Hematological: Does not bruise/bleed easily  Psychiatric/Behavioral: Negative for confusion  All other systems reviewed and are negative  Vitals  Vitals:    12/08/22 0827   BP: 142/92   Pulse: 81   SpO2: 99%   Weight: 96 2 kg (212 lb)   Height: 5' 11" (1 803 m)       Physical Exam  Constitutional:       General: He is not in acute distress  Appearance: Normal appearance  He is not ill-appearing, toxic-appearing or diaphoretic  HENT:      Head: Normocephalic  Nose: No congestion  Eyes:      General: No scleral icterus  Right eye: No discharge  Left eye: No discharge  Conjunctiva/sclera: Conjunctivae normal       Pupils: Pupils are equal, round, and reactive to light  Pulmonary:      Effort: Pulmonary effort is normal    Genitourinary:     Comments: Circumcised penis, normal phallus, orthotopic and patent meatus  Testes are smooth and descended bilaterally, non-tender without palpable mass  Scrotal and inguinal skin without abnormalities  ROGERIO today showing boggy and tender prostate  Musculoskeletal:      Cervical back: Normal range of motion  Skin:     General: Skin is warm and dry  Coloration: Skin is not jaundiced or pale  Findings: No bruising, erythema, lesion or rash  Neurological:      General: No focal deficit present  Mental Status: He is alert and oriented to person, place, and time  Mental status is at baseline  Gait: Gait normal    Psychiatric:         Mood and Affect: Mood normal          Behavior: Behavior normal          Thought Content:  Thought content normal          Judgment: Judgment normal  Past History  Past Medical History:   Diagnosis Date   • Anal abscess 3/12/2019   • 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   • GERD (gastroesophageal reflux disease)      Social History     Socioeconomic History   • Marital status: /Civil Union     Spouse name: None   • Number of children: None   • Years of education: None   • Highest education level: None   Occupational History   • None   Tobacco Use   • Smoking status: Every Day     Packs/day: 1 00     Years: 15 00     Pack years: 15 00     Types: Cigarettes   • Smokeless tobacco: Never   Vaping Use   • Vaping Use: Never used   Substance and Sexual Activity   • Alcohol use: Yes     Comment: very rarely   • Drug use: Not Currently     Frequency: 2 0 times per week     Types: Marijuana     Comment: none recently   • Sexual activity: Yes     Partners: Female   Other Topics Concern   • None   Social History Narrative   • None     Social Determinants of Health     Financial Resource Strain: Not on file   Food Insecurity: Not on file   Transportation Needs: Not on file   Physical Activity: Not on file   Stress: No Stress Concern Present   • Feeling of Stress :  Only a little   Social Connections: Not on file   Intimate Partner Violence: Not on file   Housing Stability: Not on file     Social History     Tobacco Use   Smoking Status Every Day   • Packs/day: 1 00   • Years: 15 00   • Pack years: 15 00   • Types: Cigarettes   Smokeless Tobacco Never     Family History   Problem Relation Age of Onset   • Diabetes Father        The following portions of the patient's history were reviewed and updated as appropriate: allergies, current medications, past medical history, past social history, past surgical history and problem list     Results  Recent Results (from the past 1 hour(s))   POCT urine dip    Collection Time: 12/08/22  8:29 AM   Result Value Ref Range    LEUKOCYTE ESTERASE,UA -     NITRITE,UA -     SL AMB POCT UROBILINOGEN 0 2     POCT URINE PROTEIN -      PH,UA 7 5 [facility] 1 010     [facility] -     GLUCOSE, UA -      COLOR,UA Yellow     CLARITY,UA Clear    POCT Measure PVR    Collection Time: 12/08/22  8:30 AM   Result Value Ref Range    POST-VOID RESIDUAL VOLUME, ML POC 22 mL   ]  No results found for: PSA  Lab Results   Component Value Date    CALCIUM 9 3 11/12/2022    K 4 3 11/12/2022    CO2 26 11/12/2022     11/12/2022    BUN 11 11/12/2022    CREATININE 0 76 11/12/2022     Lab Results   Component Value Date    WBC 10 36 (H) 11/12/2022    HGB 14 9 11/12/2022    HCT 43 6 11/12/2022    MCV 96 11/12/2022     11/12/2022       Clarke Rendon PA-C

## 2022-12-05 NOTE — ASSESSMENT & PLAN NOTE
Reports epigastric discomfort and changes in appetite  He follows with GI  He was prescribed medications by them which she does not take  Patient reports he uses THC daily  Discussed the affects of THC on the GI system  Recommend patient try to discontinue use of THC to see if symptoms of GERD abdominal discomfort and early satiety improved  Patient also counseled on trying to avoid triggers for GERD such as alcohol, nicotine, spicy foods    He will be following up with Gastroenterology later this month

## 2022-12-08 ENCOUNTER — OFFICE VISIT (OUTPATIENT)
Dept: UROLOGY | Facility: CLINIC | Age: 37
End: 2022-12-08

## 2022-12-08 VITALS
SYSTOLIC BLOOD PRESSURE: 142 MMHG | BODY MASS INDEX: 29.68 KG/M2 | HEART RATE: 81 BPM | WEIGHT: 212 LBS | DIASTOLIC BLOOD PRESSURE: 92 MMHG | OXYGEN SATURATION: 99 % | HEIGHT: 71 IN

## 2022-12-08 DIAGNOSIS — N41.9 PROSTATITIS, UNSPECIFIED PROSTATITIS TYPE: ICD-10-CM

## 2022-12-08 DIAGNOSIS — R39.198 DIFFICULTY URINATING: Primary | ICD-10-CM

## 2022-12-08 LAB
POST-VOID RESIDUAL VOLUME, ML POC: 22 ML
SL AMB  POCT GLUCOSE, UA: NORMAL
SL AMB LEUKOCYTE ESTERASE,UA: NORMAL
SL AMB POCT BILIRUBIN,UA: NORMAL
SL AMB POCT BLOOD,UA: NORMAL
SL AMB POCT CLARITY,UA: CLEAR
SL AMB POCT COLOR,UA: YELLOW
SL AMB POCT KETONES,UA: NORMAL
SL AMB POCT NITRITE,UA: NORMAL
SL AMB POCT PH,UA: 7.5
SL AMB POCT SPECIFIC GRAVITY,UA: 1.01
SL AMB POCT URINE PROTEIN: NORMAL
SL AMB POCT UROBILINOGEN: 0.2

## 2022-12-08 RX ORDER — CIPROFLOXACIN 500 MG/1
500 TABLET, FILM COATED ORAL EVERY 12 HOURS SCHEDULED
Qty: 28 TABLET | Refills: 0 | Status: SHIPPED | OUTPATIENT
Start: 2022-12-08 | End: 2022-12-22

## 2022-12-15 ENCOUNTER — HOSPITAL ENCOUNTER (OUTPATIENT)
Dept: MRI IMAGING | Facility: HOSPITAL | Age: 37
End: 2022-12-15

## 2022-12-15 DIAGNOSIS — K76.9 LESION OF LIVER GREATER THAN 1 CM IN DIAMETER: ICD-10-CM

## 2022-12-15 RX ADMIN — GADOBUTROL 9 ML: 604.72 INJECTION INTRAVENOUS at 10:39

## 2022-12-16 ENCOUNTER — OFFICE VISIT (OUTPATIENT)
Dept: GASTROENTEROLOGY | Facility: CLINIC | Age: 37
End: 2022-12-16

## 2022-12-16 VITALS
DIASTOLIC BLOOD PRESSURE: 72 MMHG | BODY MASS INDEX: 29.26 KG/M2 | SYSTOLIC BLOOD PRESSURE: 116 MMHG | HEIGHT: 71 IN | WEIGHT: 209 LBS | RESPIRATION RATE: 18 BRPM

## 2022-12-16 DIAGNOSIS — K21.00 GASTROESOPHAGEAL REFLUX DISEASE WITH ESOPHAGITIS WITHOUT HEMORRHAGE: Primary | ICD-10-CM

## 2022-12-16 DIAGNOSIS — R19.4 CHANGE IN BOWEL HABITS: ICD-10-CM

## 2022-12-16 DIAGNOSIS — R19.7 DIARRHEA, UNSPECIFIED TYPE: ICD-10-CM

## 2022-12-16 DIAGNOSIS — R13.10 DYSPHAGIA, UNSPECIFIED TYPE: ICD-10-CM

## 2022-12-16 DIAGNOSIS — K62.5 RECTAL BLEEDING: ICD-10-CM

## 2022-12-16 DIAGNOSIS — R10.33 PERIUMBILICAL ABDOMINAL PAIN: ICD-10-CM

## 2022-12-16 DIAGNOSIS — R19.8 UMBILICAL BLEEDING: ICD-10-CM

## 2022-12-18 VITALS
OXYGEN SATURATION: 97 % | HEART RATE: 97 BPM | RESPIRATION RATE: 18 BRPM | SYSTOLIC BLOOD PRESSURE: 98 MMHG | DIASTOLIC BLOOD PRESSURE: 67 MMHG | TEMPERATURE: 97.8 F

## 2022-12-19 ENCOUNTER — HOSPITAL ENCOUNTER (EMERGENCY)
Facility: HOSPITAL | Age: 37
Discharge: HOME | End: 2022-12-19
Attending: EMERGENCY MEDICINE

## 2022-12-19 ENCOUNTER — TELEPHONE (OUTPATIENT)
Dept: GASTROENTEROLOGY | Facility: CLINIC | Age: 37
End: 2022-12-19

## 2022-12-19 DIAGNOSIS — L03.90 CELLULITIS: Primary | ICD-10-CM

## 2022-12-19 RX ORDER — CEPHALEXIN 250 MG/1
500 CAPSULE ORAL ONCE
Status: COMPLETED | OUTPATIENT
Start: 2022-12-19 | End: 2022-12-19

## 2022-12-19 RX ORDER — ONDANSETRON 4 MG/1
4 TABLET, ORALLY DISINTEGRATING ORAL ONCE
Status: COMPLETED | OUTPATIENT
Start: 2022-12-19 | End: 2022-12-19

## 2022-12-19 RX ORDER — KETOROLAC TROMETHAMINE 30 MG/ML
15 INJECTION, SOLUTION INTRAMUSCULAR; INTRAVENOUS ONCE
Status: COMPLETED | OUTPATIENT
Start: 2022-12-19 | End: 2022-12-19

## 2022-12-19 RX ORDER — CEPHALEXIN 500 MG/1
500 CAPSULE ORAL EVERY 6 HOURS SCHEDULED
Qty: 28 CAPSULE | Refills: 0 | Status: SHIPPED | OUTPATIENT
Start: 2022-12-19 | End: 2022-12-26

## 2022-12-19 RX ADMIN — CEPHALEXIN 500 MG: 250 CAPSULE ORAL at 01:12

## 2022-12-19 RX ADMIN — ONDANSETRON 4 MG: 4 TABLET, ORALLY DISINTEGRATING ORAL at 01:12

## 2022-12-19 RX ADMIN — KETOROLAC TROMETHAMINE 15 MG: 30 INJECTION, SOLUTION INTRAMUSCULAR; INTRAVENOUS at 01:12

## 2022-12-19 NOTE — TELEPHONE ENCOUNTER
Scheduled date of EGD/colonoscopy (as of today): 3/7/23    Physician performing EGD/colonoscopy: Dr Rustam Alicia    Location of EGD/colonoscopy: [facility]

## 2022-12-20 DIAGNOSIS — N41.9 PROSTATITIS, UNSPECIFIED PROSTATITIS TYPE: Primary | ICD-10-CM

## 2022-12-20 RX ORDER — CIPROFLOXACIN 500 MG/1
500 TABLET, FILM COATED ORAL EVERY 12 HOURS SCHEDULED
Qty: 2 TABLET | Refills: 0 | Status: SHIPPED | OUTPATIENT
Start: 2022-12-20 | End: 2022-12-21

## 2022-12-23 NOTE — ED PROVIDER NOTES
History  Chief Complaint   Patient presents with   • Flu Symptoms     Pt reports flu symptoms for the last two hours with N/V   • Medical Problem     Pt states "I'm concerned that I am bleeding from my belly button for the last 2 hours"  No bleeding noted during triage  67-year-old male presents emergency department for flulike symptoms  Patient has fever congestion source of his nausea vomiting, also concerned because he has had some bleeding/purulent discharge from his bellybutton, has been told that he had infection of the bellybutton before  He got better with antibiotics and states it has not since recurred  No other abdominal pain, no changes in bowel or bladder habits  He was seen by GI for his bellybutton issue and was told that he may need to see a surgeon in his concerned about that here today  Prior to Admission Medications   Prescriptions Last Dose Informant Patient Reported? Taking?   ciprofloxacin (CIPRO) 500 mg tablet   No No   Sig: Take 1 tablet (500 mg total) by mouth every 12 (twelve) hours for 14 days   Patient not taking: Reported on 2022   fluticasone (FLONASE) 50 mcg/act nasal spray   No No   Si sprays into each nostril daily   Patient not taking: Reported on 10/6/2022   naproxen (NAPROSYN) 500 mg tablet   No No   Sig: Take 1 tablet (500 mg total) by mouth every 12 (twelve) hours as needed for mild pain or moderate pain   Patient not taking: Reported on 2022   sucralfate (CARAFATE) 1 g tablet   No No   Sig: Take 1 tablet (1 g total) by mouth 4 (four) times a day Crush tablet and mix with 10 ML water to make a slurry     Patient not taking: Reported on 10/6/2022      Facility-Administered Medications: None       Past Medical History:   Diagnosis Date   • Anal abscess 3/12/2019   • Anal fistula 2019   • GERD (gastroesophageal reflux disease)        Past Surgical History:   Procedure Laterality Date   • ABCESS DRAINAGE  10/29/2018    carlos anal   • ANAL FISTULOTOMY N/A 3/14/2019    Procedure: FISTULOTOMY;  Surgeon: Mamie Stoddard MD;  [facility];  Service: Colorectal   • COLONOSCOPY     • [place] COLONOSCOPY FLX DX W/COLLJ SPEC WHEN PFRMD N/A 11/21/2018    Procedure: COLONOSCOPY;  Surgeon: Sloan Tesfaye MD;  Location: [facility]; Service: Gastroenterology   • [place] ESOPHAGOGASTRODUODENOSCOPY TRANSORAL DIAGNOSTIC N/A 11/21/2018    Procedure: ESOPHAGOGASTRODUODENOSCOPY (EGD); Surgeon: Sloan Tesfaye MD;  [facility]; Service: Gastroenterology   • [place] ESOPHAGOGASTRODUODENOSCOPY TRANSORAL DIAGNOSTIC N/A 2/7/2019    Procedure: ESOPHAGOGASTRODUODENOSCOPY (EGD); Surgeon: Sloan Tesfaye MD;  [facility]; Service: Gastroenterology   • [place] LAMNOTMY INCL W/DCMPRSN NRV ROOT 1 INTRSPC LUMBR Left 11/22/2019    Procedure: LAMINECTOMY LUMBAR/THORACIC; L4/5 microdiscectomy;  Surgeon: Andreina Crockett MD;  Location:  MAIN OR;  Service: Neurosurgery   • TONSILLECTOMY         Family History   Problem Relation Age of Onset   • Diabetes Father      I have reviewed and agree with the history as documented  E-Cigarette/Vaping   • E-Cigarette Use Never User      E-Cigarette/Vaping Substances   • Nicotine No    • THC No    • CBD No    • Flavoring No    • Other No    • Unknown No      Social History     Tobacco Use   • Smoking status: Every Day     Packs/day: 1 00     Years: 15 00     Pack years: 15 00     Types: Cigarettes   • Smokeless tobacco: Never   Vaping Use   • Vaping Use: Never used   Substance Use Topics   • Alcohol use: Yes     Comment: very rarely   • Drug use: Not Currently     Frequency: 2 0 times per week     Types: Marijuana     Comment: none recently       Review of Systems   Constitutional: Positive for fever  Negative for appetite change, chills and fatigue  HENT: Positive for congestion  Negative for sneezing and sore throat  Eyes: Negative for visual disturbance     Respiratory: Negative for cough, choking, chest tightness, shortness of breath and wheezing  Cardiovascular: Negative for chest pain and palpitations  Gastrointestinal: Positive for nausea and vomiting  Negative for abdominal pain, constipation and diarrhea  Genitourinary: Negative for difficulty urinating and dysuria  Skin: Positive for wound  Neurological: Negative for dizziness, weakness, light-headedness, numbness and headaches  All other systems reviewed and are negative  Physical Exam  Physical Exam  Vitals and nursing note reviewed  Constitutional:       General: He is not in acute distress  Appearance: He is well-developed  He is not diaphoretic  HENT:      Head: Normocephalic and atraumatic  Eyes:      Pupils: Pupils are equal, round, and reactive to light  Neck:      Vascular: No JVD  Trachea: No tracheal deviation  Cardiovascular:      Rate and Rhythm: Normal rate and regular rhythm  Heart sounds: Normal heart sounds  No murmur heard  No friction rub  No gallop  Pulmonary:      Effort: Pulmonary effort is normal  No respiratory distress  Breath sounds: Normal breath sounds  No wheezing or rales  Abdominal:      General: Bowel sounds are normal  There is no distension  Palpations: Abdomen is soft  Tenderness: There is no abdominal tenderness  There is no guarding or rebound  Skin:     General: Skin is warm and dry  Coloration: Skin is not pale  Comments: No overt abscess with cellulitis of the umbilicus, there is some serosanguineous discharge   Neurological:      Mental Status: He is alert and oriented to person, place, and time  Cranial Nerves: No cranial nerve deficit  Motor: No abnormal muscle tone     Psychiatric:         Behavior: Behavior normal          Vital Signs  ED Triage Vitals   Temperature Pulse Respirations Blood Pressure SpO2   12/18/22 2344 12/18/22 2344 12/18/22 2344 12/18/22 2344 12/18/22 2344   97 8 °F (36 6 °C) 97 18 98/67 97 %      Temp Source Heart Rate Source Patient Position - Orthostatic VS BP Location FiO2 (%)   12/18/22 2344 12/18/22 2344 12/18/22 2344 12/18/22 2344 --   Oral Monitor Sitting Left arm       Pain Score       12/19/22 0112       Med Not Given for Pain - for MAR use only           Vitals:    12/18/22 2344   BP: 98/67   Pulse: 97   Patient Position - Orthostatic VS: Sitting         Visual Acuity      ED Medications  Medications   ondansetron (ZOFRAN-ODT) dispersible tablet 4 mg (4 mg Oral Given 12/19/22 0112)   ketorolac (TORADOL) injection 15 mg (15 mg Intravenous Given 12/19/22 0112)   cephalexin (KEFLEX) capsule 500 mg (500 mg Oral Given 12/19/22 0112)       Diagnostic Studies  Results Reviewed     Procedure Component Value Units Date/Time    FLU/RSV/COVID - if FLU/RSV clinically relevant [[id number]]  (Normal) Collected: 12/18/22 2350    Lab Status: Final result Specimen: Nares from Nose Updated: 12/19/22 0036     SARS-CoV-2 Negative     INFLUENZA A PCR Negative     INFLUENZA B PCR Negative     RSV PCR Negative    Narrative:      FOR PEDIATRIC PATIENTS - copy/paste COVID Guidelines URL to browser: [email or web address]  [email or web address]    SARS-CoV-2 assay is a Nucleic Acid Amplification assay intended for the  qualitative detection of nucleic acid from SARS-CoV-2 in nasopharyngeal  swabs  Results are for the presumptive identification of SARS-CoV-2 RNA  Positive results are indicative of infection with SARS-CoV-2, the virus  causing COVID-19, but do not rule out bacterial infection or co-infection  with other viruses  Laboratories within the [place] and its  territories are required to report all positive results to the appropriate  public health authorities  Negative results do not preclude SARS-CoV-2  infection and should not be used as the sole basis for treatment or other  patient management decisions   Negative results must be combined with  clinical observations, patient history, and epidemiological information  This test has not been FDA cleared or approved  This test has been authorized by FDA under an Emergency Use Authorization  (EUA)  This test is only authorized for the duration of time the  declaration that circumstances exist justifying the authorization of the  emergency use of an in vitro diagnostic tests for detection of SARS-CoV-2  virus and/or diagnosis of COVID-19 infection under section 564(b)(1) of  the Act, 21 U  S C  830MTQ-0(S)(8), unless the authorization is terminated  or revoked sooner  The test has been validated but independent review by FDA  and CLIA is pending  Test performed using [employer] Gene[employer]pert: This RT-PCR assay targets N2,  a region unique to SARS-CoV-2  A conserved region in the E-gene was chosen  for pan-Sarbecovirus detection which includes SARS-CoV-2  According to CMS-2020-01-R, this platform meets the definition of high-throughput technology  No orders to display              Procedures  Procedures         ED Course                               SBIRT 20yo+    Flowsheet Row Most Recent Value   SBIRT (23 yo +)    In order to provide better care to our patients, we are screening all of our patients for alcohol and drug use  Would it be okay to ask you these screening questions? Yes Filed at: 12/19/2022 0013   Initial Alcohol Screen: US AUDIT-C     1  How often do you have a drink containing alcohol? 0 Filed at: 12/19/2022 0013   2  How many drinks containing alcohol do you have on a typical day you are drinking? 0 Filed at: 12/19/2022 0013   3a  Male UNDER 65: How often do you have five or more drinks on one occasion? 0 Filed at: 12/19/2022 0013   3b  FEMALE Any Age, or MALE 65+: How often do you have 4 or more drinks on one occassion? 0 Filed at: 12/19/2022 0013   Audit-C Score 0 Filed at: 12/19/2022 0013   MICHA: How many times in the past year have you        Used an illegal drug or used a prescription medication for non-medical reasons? Never Filed at: 12/19/2022 0013                    [facility]  Number of Diagnoses or Management Options  Cellulitis  Diagnosis management comments: 44-year-old male with possible cellulitis from the umbilicus, no evidence of abscess here, he is well-appearing, will give oral antiemetics, p o  challenge, recommend NSAID, will give antibiotics, refer to outpatient surgery further evaluation is needed  Disposition  Final diagnoses:   Cellulitis     Time reflects when diagnosis was documented in both MDM as applicable and the Disposition within this note     Time User Action Codes Description Comment    12/19/2022  1:28 AM Geremias Harrison [[id number]] Cellulitis       ED Disposition     ED Disposition   Discharge    Condition   Stable    Date/Time   Mon Dec 19, 2022  1:28 AM    Comment   Francisco Records discharge to home/self care                 Follow-up Information     Follow up With Specialties Details Why MD [facility]   [address]  [address]  [facility] [address]  [phone]            Discharge Medication List as of 12/19/2022  1:33 AM      START taking these medications    Details   cephalexin (KEFLEX) 500 mg capsule Take 1 capsule (500 mg total) by mouth every 6 (six) hours for 7 days, Starting Mon 12/19/2022, Until Mon 12/26/2022, Normal         CONTINUE these medications which have NOT CHANGED    Details   ciprofloxacin (CIPRO) 500 mg tablet Take 1 tablet (500 mg total) by mouth every 12 (twelve) hours for 14 days, Starting Thu 12/8/2022, Until u 12/22/2022, Normal      fluticasone (FLONASE) 50 mcg/act nasal spray 2 sprays into each nostril daily, Starting Thu 9/29/2022, Normal      naproxen (NAPROSYN) 500 mg tablet Take 1 tablet (500 mg total) by mouth every 12 (twelve) hours as needed for mild pain or moderate pain, Starting Sat 11/12/2022, Normal      sucralfate (CARAFATE) 1 g tablet Take 1 tablet (1 g total) by mouth 4 (four) times a day Crush tablet and mix with 10 ML water to make a slurry  , Starting Tue 12/14/2021, Until Thu 1/13/2022, Normal             No discharge procedures on file      PDMP Review     None          ED Provider  Electronically Signed by           Mina Vang MD  12/22/22 2010

## 2023-02-03 ENCOUNTER — HOSPITAL ENCOUNTER (OUTPATIENT)
Dept: CT IMAGING | Facility: HOSPITAL | Age: 38
End: 2023-02-03
Attending: INTERNAL MEDICINE

## 2023-02-03 DIAGNOSIS — R10.33 PERIUMBILICAL ABDOMINAL PAIN: ICD-10-CM

## 2023-02-03 DIAGNOSIS — R19.8 UMBILICAL BLEEDING: ICD-10-CM

## 2023-02-03 DIAGNOSIS — R19.7 DIARRHEA, UNSPECIFIED TYPE: ICD-10-CM

## 2023-02-03 RX ADMIN — IOHEXOL 100 ML: 350 INJECTION, SOLUTION INTRAVENOUS at 15:53

## 2023-02-09 ENCOUNTER — TELEPHONE (OUTPATIENT)
Dept: OTHER | Facility: OTHER | Age: 38
End: 2023-02-09

## 2023-02-10 ENCOUNTER — TELEPHONE (OUTPATIENT)
Dept: GASTROENTEROLOGY | Facility: CLINIC | Age: 38
End: 2023-02-10

## 2023-02-10 DIAGNOSIS — K60.3 PERIANAL FISTULA: Primary | ICD-10-CM

## 2023-02-10 NOTE — TELEPHONE ENCOUNTER
----- Message from Bill Dorman MD sent at 2/10/2023  2:55 PM EST -----  Results reviewed with patient  Please order MRI Pelvis with and without contrast, perianal fistula protocol for abnormal CT, and refer patient to dr Sis Gates who did his original fistulotomy

## 2023-02-13 ENCOUNTER — TELEPHONE (OUTPATIENT)
Dept: OTHER | Facility: OTHER | Age: 38
End: 2023-02-13

## 2023-02-13 NOTE — TELEPHONE ENCOUNTER
Pt calling and stated has a colonoscopy for 3/7/2023 and stated wanted to speak to someone in office stated had some question he wanted to discuss

## 2023-02-16 ENCOUNTER — TELEPHONE (OUTPATIENT)
Dept: GASTROENTEROLOGY | Facility: CLINIC | Age: 38
End: 2023-02-16

## 2023-02-16 DIAGNOSIS — K44.9 HIATAL HERNIA: Primary | ICD-10-CM

## 2023-02-16 DIAGNOSIS — K60.3 PERIANAL FISTULA: ICD-10-CM

## 2023-02-16 NOTE — TELEPHONE ENCOUNTER
Patients GI provider:  Dr Xiomy Cho    Number to return call: [phone]    Reason for call: Pt's wife called to request the Dr's name on the referral for Colon Rectal Surgery be changed to Amrita Monroy    Scheduled procedure/appointment date if applicable: Procedure 5/7/13

## 2023-02-21 ENCOUNTER — HOSPITAL ENCOUNTER (OUTPATIENT)
Dept: MRI IMAGING | Facility: HOSPITAL | Age: 38
Discharge: HOME | End: 2023-02-21
Attending: INTERNAL MEDICINE

## 2023-02-21 DIAGNOSIS — K60.3 PERIANAL FISTULA: ICD-10-CM

## 2023-02-21 RX ADMIN — GADOBUTROL 9 ML: 604.72 INJECTION INTRAVENOUS at 11:52

## 2023-02-21 NOTE — TELEPHONE ENCOUNTER
Patient missed a call from the office and called in to see who may have called him   No message was left for the patient

## 2023-02-21 NOTE — TELEPHONE ENCOUNTER
If 1 has not been put in, please refer the patient to general surgery, Dr Ranulfo Fish  Patient may have omphalitis    MRI was done today but results are pending

## 2023-02-21 NOTE — TELEPHONE ENCOUNTER
Patient also needs a referral for general surgery for his upcoming appointment with Dr Sharri Lee on 03-08-23

## 2023-02-21 NOTE — TELEPHONE ENCOUNTER
Dr Sonia Christine can we put in referral for Gen   Sx, pt PCP referred pt for umbilical hernia, hiatal  Please review and confirm referral

## 2023-03-07 ENCOUNTER — ANESTHESIA EVENT (OUTPATIENT)
Dept: GASTROENTEROLOGY | Facility: HOSPITAL | Age: 38
End: 2023-03-07

## 2023-03-07 ENCOUNTER — HOSPITAL ENCOUNTER (OUTPATIENT)
Dept: GASTROENTEROLOGY | Facility: HOSPITAL | Age: 38
Setting detail: OUTPATIENT SURGERY
Discharge: HOME | End: 2023-03-07
Attending: INTERNAL MEDICINE

## 2023-03-07 ENCOUNTER — ANESTHESIA (OUTPATIENT)
Dept: GASTROENTEROLOGY | Facility: HOSPITAL | Age: 38
End: 2023-03-07

## 2023-03-07 VITALS
WEIGHT: 213.41 LBS | HEART RATE: 63 BPM | BODY MASS INDEX: 29.88 KG/M2 | SYSTOLIC BLOOD PRESSURE: 109 MMHG | DIASTOLIC BLOOD PRESSURE: 65 MMHG | RESPIRATION RATE: 17 BRPM | OXYGEN SATURATION: 97 % | HEIGHT: 71 IN | TEMPERATURE: 97.9 F

## 2023-03-07 DIAGNOSIS — K22.10 EROSIVE ESOPHAGITIS: Primary | ICD-10-CM

## 2023-03-07 DIAGNOSIS — R10.33 PERIUMBILICAL ABDOMINAL PAIN: ICD-10-CM

## 2023-03-07 DIAGNOSIS — R19.7 DIARRHEA, UNSPECIFIED TYPE: ICD-10-CM

## 2023-03-07 DIAGNOSIS — R19.8 UMBILICAL BLEEDING: ICD-10-CM

## 2023-03-07 DIAGNOSIS — K21.00 GASTROESOPHAGEAL REFLUX DISEASE WITH ESOPHAGITIS WITHOUT HEMORRHAGE: ICD-10-CM

## 2023-03-07 DIAGNOSIS — K22.2 ESOPHAGEAL STRICTURE: ICD-10-CM

## 2023-03-07 DIAGNOSIS — R13.10 DYSPHAGIA, UNSPECIFIED TYPE: ICD-10-CM

## 2023-03-07 DIAGNOSIS — R19.4 CHANGE IN BOWEL HABITS: ICD-10-CM

## 2023-03-07 DIAGNOSIS — K62.5 RECTAL BLEEDING: ICD-10-CM

## 2023-03-07 RX ORDER — LIDOCAINE HYDROCHLORIDE 20 MG/ML
INJECTION, SOLUTION EPIDURAL; INFILTRATION; INTRACAUDAL; PERINEURAL AS NEEDED
Status: DISCONTINUED | OUTPATIENT
Start: 2023-03-07 | End: 2023-03-07

## 2023-03-07 RX ORDER — PANTOPRAZOLE SODIUM 40 MG/1
40 TABLET, DELAYED RELEASE ORAL
Qty: 60 TABLET | Refills: 5 | Status: SHIPPED | OUTPATIENT
Start: 2023-03-07

## 2023-03-07 RX ORDER — SODIUM CHLORIDE, SODIUM LACTATE, POTASSIUM CHLORIDE, CALCIUM CHLORIDE 600; 310; 30; 20 MG/100ML; MG/100ML; MG/100ML; MG/100ML
INJECTION, SOLUTION INTRAVENOUS CONTINUOUS PRN
Status: DISCONTINUED | OUTPATIENT
Start: 2023-03-07 | End: 2023-03-07

## 2023-03-07 RX ORDER — PROPOFOL 10 MG/ML
INJECTION, EMULSION INTRAVENOUS AS NEEDED
Status: DISCONTINUED | OUTPATIENT
Start: 2023-03-07 | End: 2023-03-07

## 2023-03-07 RX ADMIN — PROPOFOL 50 MG: 10 INJECTION, EMULSION INTRAVENOUS at 08:41

## 2023-03-07 RX ADMIN — PROPOFOL 50 MG: 10 INJECTION, EMULSION INTRAVENOUS at 08:29

## 2023-03-07 RX ADMIN — PROPOFOL 100 MG: 10 INJECTION, EMULSION INTRAVENOUS at 08:32

## 2023-03-07 RX ADMIN — LIDOCAINE HYDROCHLORIDE 100 MG: 20 INJECTION, SOLUTION EPIDURAL; INFILTRATION; INTRACAUDAL at 08:28

## 2023-03-07 RX ADMIN — PROPOFOL 150 MG: 10 INJECTION, EMULSION INTRAVENOUS at 08:28

## 2023-03-07 RX ADMIN — PROPOFOL 100 MG: 10 INJECTION, EMULSION INTRAVENOUS at 08:34

## 2023-03-07 RX ADMIN — SODIUM CHLORIDE, SODIUM LACTATE, POTASSIUM CHLORIDE, AND CALCIUM CHLORIDE: .6; .31; .03; .02 INJECTION, SOLUTION INTRAVENOUS at 07:50

## 2023-03-07 RX ADMIN — PROPOFOL 50 MG: 10 INJECTION, EMULSION INTRAVENOUS at 08:38

## 2023-03-07 NOTE — ANESTHESIA PREPROCEDURE EVALUATION
Procedure:  COLONOSCOPY  EGD    Relevant Problems   CARDIO   (+) Migraine      GI/HEPATIC   (+) Dysphagia   (+) GERD (gastroesophageal reflux disease)      NEURO/PSYCH   (+) Depression   (+) History of rectal abscess   (+) Migraine      PULMONARY   (+) Sleep apnea, unspecified        Physical Exam    Airway    Mallampati score: II  TM Distance: >3 FB  Neck ROM: full     Dental   Comment: edentulous,     Cardiovascular      Pulmonary      Other Findings        Anesthesia Plan  ASA Score- 2     Anesthesia Type- IV sedation with anesthesia with ASA Monitors  Additional Monitors:   Airway Plan:           Plan Factors-    Chart reviewed  Patient summary reviewed  Induction- intravenous  Postoperative Plan-     Informed Consent- Anesthetic plan and risks discussed with patient  I personally reviewed this patient with the CRNA  Discussed and agreed on the Anesthesia Plan with the CRNA  Guanako Solano

## 2023-03-07 NOTE — H&P
History and Physical - [facility]  Mohan Sultana 40 y o  male MRN: [id number]                  HPI: Mohan Sultana is a 40y o  year old male who presents for EGD and colonoscopy for GERD, abdominal pain, dysphagia, change in bowel habits with diarrhea, rectal bleeding  No prior colonoscopy      REVIEW OF SYSTEMS: Per the HPI, and otherwise unremarkable  Historical Information   Past Medical History:   Diagnosis Date   • Anal abscess 3/12/2019   • 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   • GERD (gastroesophageal reflux disease)      Past Surgical History:   Procedure Laterality Date   • ABCESS DRAINAGE  10/29/2018    carlos anal   • ANAL FISTULOTOMY N/A 3/14/2019    Procedure: FISTULOTOMY;  Surgeon: Luke Dumont MD;  [facility];  Service: Colorectal   • COLONOSCOPY     • [place] COLONOSCOPY FLX DX W/COLLJ SPEC WHEN PFRMD N/A 11/21/2018    Procedure: COLONOSCOPY;  Surgeon: Judy Shen MD;  [facility]; Service: Gastroenterology   • [place] ESOPHAGOGASTRODUODENOSCOPY TRANSORAL DIAGNOSTIC N/A 11/21/2018    Procedure: ESOPHAGOGASTRODUODENOSCOPY (EGD); Surgeon: Judy Shen MD;  [facility]; Service: Gastroenterology   • [place] ESOPHAGOGASTRODUODENOSCOPY TRANSORAL DIAGNOSTIC N/A 2/7/2019    Procedure: ESOPHAGOGASTRODUODENOSCOPY (EGD); Surgeon: Judy Shen MD;  [facility];   Service: Gastroenterology   • [place] LAMNOTMY INCL W/DCMPRSN NRV ROOT 1 INTRSPC LUMBR Left 11/22/2019    Procedure: LAMINECTOMY LUMBAR/THORACIC; L4/5 microdiscectomy;  Surgeon: Lona Andrews MD;  Location:  MAIN OR;  Service: Neurosurgery   • TONSILLECTOMY       Social History   Social History     Substance and Sexual Activity   Alcohol Use Yes    Comment: very rarely     Social History     Substance and Sexual Activity   Drug Use Not Currently   • Types: Marijuana    Comment: weekly     Social History     Tobacco Use   Smoking Status Every Day   • Packs/day: 1 00   • Years: 15 00   • Pack years: 15 00   • Types: Cigarettes   Smokeless Tobacco Never     Family History   Problem Relation Age of Onset   • Diabetes Father        Meds/Allergies     (Not in a hospital admission)      No Known Allergies    Objective     Blood pressure 109/59, pulse 72, temperature 98 °F (36 7 °C), temperature source Temporal, resp  rate 16, height 5' 11" (1 803 m), weight 96 8 kg (213 lb 6 5 oz), SpO2 97 %        PHYSICAL EXAM    Gen: NAD  CV: RRR  CHEST: Clear  ABD: soft, NT/ND  EXT: no edema  Neuro: AAO      ASSESSMENT/PLAN:  This is a 40y o  year old male here for GERD, abdominal pain, dysphagia, change in bowel habits, diarrhea, rectal bleeding    PLAN:   Procedure: EGD and colonoscopy

## 2023-03-07 NOTE — ANESTHESIA POSTPROCEDURE EVALUATION
Post-Op Assessment Note    CV Status:  Stable  Pain Score: 0    Pain management: adequate     Mental Status:  Sleepy and awake   Hydration Status:  Stable   PONV Controlled:  None   Airway Patency:  Patent      Post Op Vitals Reviewed: Yes      Staff: CRNA         No notable events documented      BP  96/53    Temp 98   Pulse 8-   Resp 17   SpO2 100

## 2023-03-08 ENCOUNTER — CONSULT (OUTPATIENT)
Dept: SURGERY | Facility: CLINIC | Age: 38
End: 2023-03-08

## 2023-03-08 VITALS
BODY MASS INDEX: 30.1 KG/M2 | OXYGEN SATURATION: 97 % | TEMPERATURE: 98 F | DIASTOLIC BLOOD PRESSURE: 70 MMHG | HEIGHT: 71 IN | RESPIRATION RATE: 16 BRPM | WEIGHT: 215 LBS | SYSTOLIC BLOOD PRESSURE: 116 MMHG | HEART RATE: 83 BPM

## 2023-03-08 DIAGNOSIS — K60.3 PERIANAL FISTULA: ICD-10-CM

## 2023-03-08 DIAGNOSIS — K22.10 BARRETT'S ESOPHAGEAL ULCERATION: Primary | ICD-10-CM

## 2023-03-08 DIAGNOSIS — K44.9 HIATAL HERNIA: ICD-10-CM

## 2023-03-08 PROBLEM — K60.30 PERIANAL FISTULA: Status: ACTIVE | Noted: 2023-03-08

## 2023-03-08 NOTE — PROGRESS NOTES
Consult- General Surgery   Cinthya Elder 40 y o  male MRN: [id number]  Unit/Bed#:  Encounter: [id number]    Assessment/Plan     Assessment:  Gomez's esophagus  History of GERD  Plan:  The patient main complaints are reflux symptoms for many years and obstructing symptoms from strictures seen on most recent EGD from yesterday requiring dilatation  Biopsies from EGD pending  Patient will be recommended to see GI for some sort of Gomez's esophagus ablation  I would not recommend any surgical intervention for small hiatal hernia or GERD symptoms because would not decrease the chances or incidence of esophageal cancer  There is no evidence of inflammatory or infectious process at the umbilicus at this time  No further work-up or intervention is needed from the surgical standpoint  I strongly recommended to quit smoking  History of Present Illness     HPI:  Cinthya Elder is a 40 y o  male who presents to my office for evaluation of reflux symptoms  The patient is very concerned about a hiatal hernia that has caused multiple symptoms for many years which included the acid reflux and the loss of all of his teeth  He is stated that when he bends over he feels pressure in the lower chest with some regurgitation  He denies vomiting or any other constitutional symptoms  The patient has tried multiple medications for reflux symptoms, he has changed his lifestyle, but he still smokes 1 pack a day  Review of Systems  The rest of the review of system total of 10 were negative except for the HPI      Historical Information   Past Medical History:   Diagnosis Date   • Anal abscess 3/12/2019   • 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   • GERD (gastroesophageal reflux disease)      Past Surgical History:   Procedure Laterality Date   • ABCESS DRAINAGE  10/29/2018    carlos anal   • ANAL FISTULOTOMY N/A 3/14/2019    Procedure: FISTULOTOMY;  Surgeon: Flavia Miller MD;  Location: [facility];  Service: Colorectal   • COLONOSCOPY • NE COLONOSCOPY FLX DX W/COLLJ SPEC WHEN PFRMD N/A 11/21/2018    Procedure: COLONOSCOPY;  Surgeon: Nory Ferrera MD;  Location: [facility]; Service: Gastroenterology   • NE ESOPHAGOGASTRODUODENOSCOPY TRANSORAL DIAGNOSTIC N/A 11/21/2018    Procedure: ESOPHAGOGASTRODUODENOSCOPY (EGD); Surgeon: Nory Ferrera MD;  Location[facility]; Service: Gastroenterology   • NE ESOPHAGOGASTRODUODENOSCOPY TRANSORAL DIAGNOSTIC N/A 2/7/2019    Procedure: ESOPHAGOGASTRODUODENOSCOPY (EGD); Surgeon: Nory Ferrera MD;  [facility]; Service: Gastroenterology   • NE LAMNOTMY INCL W/DCMPRSN NRV ROOT 1 INTRSPC LUMBR Left 11/22/2019    Procedure: LAMINECTOMY LUMBAR/THORACIC; L4/5 microdiscectomy;  Surgeon: Rachael Severin, MD;  Location:  MAIN OR;  Service: Neurosurgery   • TONSILLECTOMY       Social History   Social History     Substance and Sexual Activity   Alcohol Use Yes    Comment: very rarely     Social History     Substance and Sexual Activity   Drug Use Not Currently   • Types: Marijuana    Comment: weekly     Social History     Tobacco Use   Smoking Status Every Day   • Packs/day: 1 00   • Years: 15 00   • Pack years: 15 00   • Types: Cigarettes   Smokeless Tobacco Never     Family History: non-contributory    Meds/Allergies   all medications and allergies reviewed     Current Outpatient Medications:   •  pantoprazole (PROTONIX) 40 mg tablet, Take 1 tablet (40 mg total) by mouth daily before breakfast, Disp: 60 tablet, Rfl: 5  •  fluticasone (FLONASE) 50 mcg/act nasal spray, 2 sprays into each nostril daily, Disp: 16 g, Rfl: 3  •  naproxen (NAPROSYN) 500 mg tablet, Take 1 tablet (500 mg total) by mouth every 12 (twelve) hours as needed for mild pain or moderate pain, Disp: 20 tablet, Rfl: 0  •  sucralfate (CARAFATE) 1 g tablet, Take 1 tablet (1 g total) by mouth 4 (four) times a day Crush tablet and mix with 10 ML water to make a slurry  , Disp: 120 tablet, Rfl: 0  No Known Allergies    Objective Current Vitals:   Blood Pressure: 116/70 (03/08/23 1452)  Pulse: 83 (03/08/23 1452)  Temperature: 98 °F (36 7 °C) (03/08/23 1452)  Temp Source: Temporal (03/08/23 1452)  Respirations: 16 (03/08/23 1452)  Height: 5' 11" (180 3 cm) (03/08/23 1452)  Weight - Scale: 97 5 kg (215 lb) (03/08/23 1452)  SpO2: 97 % (03/08/23 1452)    Physical Exam  Vitals and nursing note reviewed  Constitutional:       General: He is not in acute distress  Cardiovascular:      Rate and Rhythm: Normal rate and regular rhythm  Heart sounds: No murmur heard  Pulmonary:      Effort: No respiratory distress  Breath sounds: Normal breath sounds  Abdominal:      Comments: Abdomen is soft, nondistended and nontender  There is no mass palpable visceromegaly noted  There is no abnormality of the umbilicus at this time  Skin:     General: Skin is warm  Coloration: Skin is not jaundiced  Findings: No erythema or rash  Neurological:      Mental Status: He is alert and oriented to person, place, and time  Cranial Nerves: No cranial nerve deficit  Psychiatric:         Mood and Affect: Mood normal          Behavior: Behavior normal        Imaging: I have personally reviewed pertinent reports  and I have personally reviewed pertinent films in PACS  Procedure: EGD    Result Date: 3/7/2023  Narrative: Table formatting from the original result was not included  [address] [facility] [address] [place] [id number] [phone] DATE OF SERVICE: 3/07/23 PHYSICIAN(S): Attending: Tiana Vargas MD Fellow: No Staff Documented INDICATION: Periumbilical abdominal pain, Dysphagia, unspecified type, Gastroesophageal reflux disease with esophagitis without hemorrhage POST-OP DIAGNOSIS: See the impression below  PREPROCEDURE: Informed consent was obtained for the procedure, including sedation  Risks of perforation, hemorrhage, adverse drug reaction and aspiration were discussed   The patient was placed in the left lateral decubitus position  Patient was explained about the risks and benefits of the procedure  Risks including but not limited to bleeding, infection, and perforation were explained in detail  Also explained about less than 100% sensitivity with the exam and other alternatives  PROCEDURE: EGD DETAILS OF PROCEDURE: Patient was taken to the procedure room where a time out was performed to confirm correct patient and correct procedure  The patient underwent monitored anesthesia care, which was administered by an anesthesia professional  The patient's blood pressure, heart rate, level of consciousness, respirations and oxygen were monitored throughout the procedure  The scope was advanced to the third part of the duodenum  Retroflexion was performed in the cardia, fundus and incisura  The patient's estimated blood loss was minimal (<5 mL)  The procedure was not difficult  The patient tolerated the procedure well  There were no apparent complications  ANESTHESIA INFORMATION: ASA: II Anesthesia Type: IV Sedation with Anesthesia MEDICATIONS: No administrations occurring from 0824 to 0834 on 03/07/23 FINDINGS: The upper third of the esophagus, middle third of the esophagus, cardia, fundus of the stomach, body of the stomach, incisura, antrum, prepyloric region, pylorus, duodenal bulb, 2nd part of the duodenum and 3rd part of the duodenum appeared normal  Z-line is 37 cm from the incisors   3 cm hiatal hernia - GE junction 37 cm from the incisors, diaphragmatic impression 40 cm from the incisors Moderate, generalized erythematous mucosa with erosion in the lower third of the esophagus, GE junction and Z-line (34 cm from the incisors); performed cold forceps biopsy Performed forceps biopsies in the upper third of the esophagus, fundus of the stomach, body of the stomach, antrum and 3rd part of the duodenum Inflamed intrinsic stricture (traversable) with length of less than 1 cm in the GE junction and Z-line (37 cm from the incisors); dilated with Milvia-Luis dilator using guidewire to 54 Fr ending size SPECIMENS: ID Type Source Tests Collected by Time Destination 1 :  Tissue Duodenum TISSUE EXAM Nory Ferrera MD 3/7/2023  8:32 AM  2 : antrum Tissue Stomach TISSUE Katarzyna Hua MD 3/7/2023  8:33 AM  3 : body Tissue Stomach TISSUE Kamron Ferrera MD 3/7/2023  8:34 AM  4 : fundus Tissue Stomach TISSUE Kamron Ferrera MD 3/7/2023  8:34 AM  5 : distal Tissue Esophagus TISSUE EXAM Nory Ferrera MD 3/7/2023  8:34 AM  6 : proximal Tissue Esophagus TISSUE EXAM Nory Ferrera MD 3/7/2023  8:34 AM      Impression: Hiatal hernia with LA class D distal esophagitis, status post biopsy and 47 Vincentian savory dilation over guidewire Otherwise normal EGD, status post gastric and duodenal biopsies RECOMMENDATION:  Begin pantoprazole 40 mg p o  every morning half hour AC breakfast Follow-up biopsy results in 3 weeks   Nory Ferrera MD     Procedure: Colonoscopy    Result Date: 3/7/2023  Narrative: Table formatting from the original result was not included  [facility] [place] [id number] [phone] DATE OF SERVICE: 3/07/23 PHYSICIAN(S): Attending: Nory Ferrera MD Fellow: No Staff Documented INDICATION: Umbilical bleeding, Rectal bleeding, Periumbilical abdominal pain, Change in bowel habits, Diarrhea, unspecified type POST-OP DIAGNOSIS: See the impression below  HISTORY: Prior colonoscopy: No prior colonoscopy  BOWEL PREPARATION: Miralax/Dulcolax PREPROCEDURE: Informed consent was obtained for the procedure, including sedation  Risks including but not limited to bleeding, infection, perforation, adverse drug reaction and aspiration were explained in detail  Also explained about less than 100% sensitivity with the exam and other alternatives  The patient was placed in the left lateral decubitus position   Procedure: Colonoscopy DETAILS OF PROCEDURE: Patient was taken to the procedure room where a time out was performed to confirm correct patient and correct procedure  The patient underwent monitored anesthesia care, which was administered by an anesthesia professional  The patient's blood pressure, heart rate, level of consciousness, oxygen and respirations were monitored throughout the procedure  A digital rectal exam was performed  The scope was introduced through the anus and advanced to the terminal ileum  Retroflexion was performed in the rectum  The quality of bowel preparation was evaluated using the John Bowel Preparation Scale with scores of: right colon = 2, transverse colon = 2, left colon = 2  The total BBPS score was 6  Bowel prep was adequate  The patient experienced no blood loss  The procedure was not difficult  The patient tolerated the procedure well  There were no apparent complications   ANESTHESIA INFORMATION: ASA: II Anesthesia Type: IV Sedation with Anesthesia MEDICATIONS: No administrations occurring from 0824 to 0846 on 03/07/23 FINDINGS: One 5 mm sessile polyp in the distal descending colon; completely removed en bloc by cold snare and retrieved specimen The terminal ileum, ileocecal valve, cecum, ascending colon, hepatic flexure, transverse colon, splenic flexure, sigmoid colon, rectosigmoid and rectum appeared normal  Normal terminal ileum Performed forceps biopsies in the terminal ileum, ascending colon and descending colon EVENTS: Procedure Events Event Event Time ENDO CECUM REACHED 3/7/2023  8:42 AM ENDO SCOPE OUT TIME 3/7/2023  8:45 AM SPECIMENS: ID Type Source Tests Collected by Time Destination 1 :  Tissue Duodenum TISSUE EXAM Anne Chamberlain MD 3/7/2023  8:32 AM  2 : antrum Tissue Stomach TISSUE EXAM Anne Chamberlain MD 3/7/2023  8:33 AM  3 : body Tissue Stomach TISSUE EXAM Anne Chamberlain MD 3/7/2023  8:34 AM  4 : fundus Tissue Stomach TISSUE EXAM Anne Chamberlain MD 3/7/2023  8:34 AM  5 : distal Tissue Esophagus TISSUE EXAM Alisha Hamilton MD 3/7/2023  8:34 AM  6 : proximal Tissue Esophagus TISSUE EXAM Alisha Hamilton MD 3/7/2023  8:34 AM  7 : distal descending Tissue Polyp, Colorectal TISSUE EXAM Alisha Hamilton MD 3/7/2023  8:41 AM  8 :  Tissue Terminal Ileum TISSUE EXAM Alisha Hamilton MD 3/7/2023  8:44 AM  9 : Tissue Large Intestine, Right/Ascending Colon TISSUE Sole Lopez MD 3/7/2023  8:44 AM  10 :  Tissue Large Intestine, Left/Descending Colon TISSUE EXAM Alisha Hamilton MD 3/7/2023  8:45 AM  EQUIPMENT: Colonoscope -[id number]     Impression: Distal descending polyp status post cold snare polypectomy, otherwise normal colon and terminal ileum status post ileal ascending and descending biopsies RECOMMENDATION:  Repeat colonoscopy in 5 years  Personal history of colon polyps  Follow-up biopsy results in 3 weeks  Alisha Hamilton MD     Procedure: CT small bowel enterography    Addendum Date: 2/13/2023 Addendum:   ADDENDUM: Visualized lung bases: Dependent bibasilar groundglass opacities are seen, as seen on prior CT abdomen pelvis 11/12/2022  Differential diagnoses include subsegmental atelectasis, postinflammatory changes or multifocal pneumonitis  Clinical correlation is recommended  There is skin thickening at the level of umbilicus, which can represent scarring or sequela of prior inflammatory changes  No findings of cellulitis or abscess formation  The study was marked in EPIC for significant notification  Result Date: 2/13/2023  Narrative: CT ABDOMEN AND PELVIS WITH IV CONTRAST- ENTEROGRAPHY - WITH CONTRAST INDICATION:   R19 7: Diarrhea, unspecified V23 32: Periumbilical pain L57 6: Other specified symptoms and signs involving the digestive system and abdomen   COMPARISON:  MRI abdomen 12/15/2022, CT abdomen pelvis 11/12/2022 TECHNIQUE:  Contrast-enhanced CT examination of the abdomen and pelvis was performed utilizing thin section technique and after the administration of low density enteric contrast according to protocol designed specifically to obtain sensitive evaluation of the small bowel  Axial, sagittal, and coronal 2D reformatted images were created from the source data and submitted for interpretation  Radiation dose length product (DLP) for this visit:  956 mGy-cm   This examination, like all CT scans performed in the [facility], was performed utilizing techniques to minimize radiation dose exposure, including the use of iterative reconstruction and automated exposure control  IV Contrast:  100 mL of iohexol (OMNIPAQUE) Enteric Contrast:  1350 cc of low density enteric contrast Elsie Anahi) was administered  FINDINGS: ABDOMEN ENTEROGRAPHY: -Small bowel distension: Inadequate distention of a few jejunal loops in the left upper quadrant  -Bowel: There is mucosal hyperenhancement and mild wall thickening involving the proximal sigmoid colon and descending colon, which can represent nonspecific colitis  No disproportionate dilation of the small or large bowel  There is no stricture, fistula, sinus tract, or abscess in the abdomen or pelvis  A small hiatal hernia  -Mesenteric findings: None  -Extraintestinal findings: None  REMAINDER OF THE ABDOMEN AND PELVIS: LIVER/BILIARY TREE:  Stable size of 0 9 cm hepatic segment 2 low-attenuation lesion, 0 7 cm hepatic segment 3 lesion and 0 6 cm hepatic segment 5 lesion, characterized as hemangioma on prior MRI abdomen 12/15/2022  Previously incompletely characterized left hepatic dome 1 3 cm lesion is not well visualized on this examination  Focal fatty infiltration at the insertion site of falciform ligament  GALLBLADDER:  No calcified gallstones  No pericholecystic inflammatory change  SPLEEN:  Unremarkable  PANCREAS:  Unremarkable   ADRENAL GLANDS: Focal nodular thickening involving the left adrenal gland measuring up to 0 6 cm (series 2 image 115), stable since 7/30/2020, likely representing benign etiology    In general an incidental adrenal mass that is <1 cm in short axis need not be pursued  Adrenal recommendation based on institutional consensus and [employer] of Radiology 2017;14:[id number] KIDNEYS/URETERS:  Unremarkable  No hydronephrosis  APPENDIX:  No findings to suggest appendicitis  ABDOMINOPELVIC CAVITY:  No ascites  No pneumoperitoneum  No lymphadenopathy  VESSELS:  Unremarkable for patient's age  PELVIS REPRODUCTIVE ORGANS:  The prostate is enlarged  URINARY BLADDER:  Unremarkable  ABDOMINAL WALL/INGUINAL REGIONS:  There is a small fat-containing umbilical hernia  There is mild fat stranding involving the left perianal region (series 2 images 383-386) OSSEOUS STRUCTURES:  No acute fracture or destructive osseous lesion  Impression: *  Findings suggestive of colitis involving the descending colon and proximal sigmoid colon (infectious, inflammatory or less likely, ischemic)  *  Mild fat stranding involving the left perianal region  Findings can represent sequela of prior inflammation or presence of an active perianal fistula  Further evaluation with MRI pelvis with and without contrast with perianal fistula protocol is recommended  *  Grossly stable size of multiple subcentimeter hepatic lesions, previously characterized as hemangioma on MRI abdomen 12/15/2022  Nonvisualization of previously seen incompletely characterized 1 3 cm left hepatic dome lesion on current examination, likely secondary to differences in technique and its peripheral location, limiting evaluation  The study was marked in EPIC for significant notification  Workstation performed: [id number]     Procedure: MRI pelvis perianal fistula wo and w contrast    Result Date: 3/1/2023  Narrative: MRI OF THE PELVIS WITH AND WITHOUT CONTRAST ( PERIANAL FISTULA) INDICATION:  Perianal fistula protocol for abnormal CT  COMPARISON: CT enterography 2/3/2022   TECHNIQUE: The following pulse sequences were obtained: Axial and coronal T1 and T2, coronal T2 with fat saturation, axial T2 with fat saturation, pre-contrast axial with fat saturation, post-contrast axial and coronal T1 with fat saturation  Imaging performed on 1 5T MRI IV Contrast:  9 mL of Gadobutrol injection (SINGLE-DOSE) FINDINGS: ANAL CANAL AND PERIANAL SOFT TISSUES: MR Grade 0: The anal sphincteric complex is normal in morphology  No fistula  No supralevator or infralevator abscess  (*[address] [facility], [employer] [address]; [phone]) ADDITIONAL FINDINGS: BLADDER:  Normal  REPRODUCTIVE STRUCTURES:  Age-appropriate  BOWEL (other than above): Unremarkable  PELVIC CAVITY:  Normal  VESSELS: No aneurysmal dilation of the major abdominopelvic arteries  PELVIC WALL: Unremarkable BONES: No suspicious osseous lesion  Impression: Enhanced MRI of the Pelvis, Perianal Fistula Protocol No perianal fistula identified   Workstation performed: [id number]

## 2023-04-25 ENCOUNTER — OFFICE VISIT (OUTPATIENT)
Age: 38
End: 2023-04-25

## 2023-04-25 VITALS
HEART RATE: 72 BPM | WEIGHT: 213 LBS | RESPIRATION RATE: 18 BRPM | HEIGHT: 71 IN | SYSTOLIC BLOOD PRESSURE: 114 MMHG | OXYGEN SATURATION: 98 % | BODY MASS INDEX: 29.82 KG/M2 | DIASTOLIC BLOOD PRESSURE: 62 MMHG | TEMPERATURE: 98.3 F

## 2023-04-25 DIAGNOSIS — H10.9 CONJUNCTIVITIS OF BOTH EYES, UNSPECIFIED CONJUNCTIVITIS TYPE: Primary | ICD-10-CM

## 2023-04-25 RX ORDER — OFLOXACIN 3 MG/ML
2 SOLUTION/ DROPS OPHTHALMIC 4 TIMES DAILY
Qty: 5 ML | Refills: 0 | Status: SHIPPED | OUTPATIENT
Start: 2023-04-25

## 2023-04-25 NOTE — PROGRESS NOTES
[facility]        NAME: Ant Figueroa is a 40 y o  male  : 1985    MRN: [id number]  DATE: 2023  TIME: 10:36 AM    Assessment and Plan   Conjunctivitis of both eyes, unspecified conjunctivitis type [H10 9]  1  Conjunctivitis of both eyes, unspecified conjunctivitis type  ofloxacin (OCUFLOX) 0 3 % ophthalmic solution        Pt  Does not wish to have a covid test at this time  Patient Instructions   Patient Instructions   1  Use own hand towel  2  F/u with PCP in 2-3 days          Follow up with PCP in 3-5 days  Proceed to  ER if symptoms worsen  Chief Complaint     Chief Complaint   Patient presents with   • Conjunctivitis     Patient states that he think he has pink eye, started a week ago  History of Present Illness       41 yo male with cc redness and d/c from both eyes  Pt  States it started in his R eye last week, and now is in his L eye  Pt  States he knows Covid can present with conjunctivitis, but denies any other symptoms I e  fever/chills/cough/congestion  Review of Systems   Review of Systems   Constitutional: Negative for chills and fever  HENT: Negative for congestion and rhinorrhea  Eyes: Positive for pain, discharge and redness  Negative for visual disturbance  Respiratory: Negative for shortness of breath and wheezing  Cardiovascular: Negative for chest pain and palpitations  Gastrointestinal: Negative for abdominal pain and vomiting  Endocrine: Negative for polydipsia and polyuria  Genitourinary: Negative for dysuria and hematuria  Musculoskeletal: Negative for arthralgias, gait problem and neck stiffness  Skin: Negative for rash and wound  Neurological: Negative for dizziness and headaches  Psychiatric/Behavioral: Negative for confusion and suicidal ideas           Current Medications       Current Outpatient Medications:   •  ofloxacin (OCUFLOX) 0 3 % ophthalmic solution, Administer 2 drops to both eyes 4 (four) times a day, Disp: 5 mL, Rfl: 0  •  fluticasone (FLONASE) 50 mcg/act nasal spray, 2 sprays into each nostril daily (Patient not taking: Reported on 4/25/2023), Disp: 16 g, Rfl: 3  •  naproxen (NAPROSYN) 500 mg tablet, Take 1 tablet (500 mg total) by mouth every 12 (twelve) hours as needed for mild pain or moderate pain (Patient not taking: Reported on 4/25/2023), Disp: 20 tablet, Rfl: 0  •  pantoprazole (PROTONIX) 40 mg tablet, Take 1 tablet (40 mg total) by mouth daily before breakfast (Patient not taking: Reported on 4/25/2023), Disp: 60 tablet, Rfl: 5  •  sucralfate (CARAFATE) 1 g tablet, Take 1 tablet (1 g total) by mouth 4 (four) times a day Crush tablet and mix with 10 ML water to make a slurry  , Disp: 120 tablet, Rfl: 0    Current Allergies     Allergies as of 04/25/2023   • (No Known Allergies)            The following portions of the patient's history were reviewed and updated as appropriate: allergies, current medications, past family history, past medical history, past social history, past surgical history and problem list      Past Medical History:   Diagnosis Date   • Anal abscess 3/12/2019   • 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   • GERD (gastroesophageal reflux disease)        Past Surgical History:   Procedure Laterality Date   • ABCESS DRAINAGE  10/29/2018    carlos anal   • ANAL FISTULOTOMY N/A 3/14/2019    Procedure: FISTULOTOMY;  Surgeon: Enriqueta Rinaldi MD;  [facility]: [facility];  Service: Colorectal   • COLONOSCOPY     • DC COLONOSCOPY FLX DX W/COLLJ SPEC WHEN PFRMD N/A 11/21/2018    Procedure: COLONOSCOPY;  Surgeon: Tunde Zhang MD;  [facility]: [facility]; Service: Gastroenterology   • DC ESOPHAGOGASTRODUODENOSCOPY TRANSORAL DIAGNOSTIC N/A 11/21/2018    Procedure: ESOPHAGOGASTRODUODENOSCOPY (EGD); Surgeon: Tunde Zhang MD;  [facility]; Service: Gastroenterology   • DC ESOPHAGOGASTRODUODENOSCOPY TRANSORAL DIAGNOSTIC N/A 2/7/2019    Procedure: ESOPHAGOGASTRODUODENOSCOPY (EGD);   Surgeon: Royal Kim Megan Andrews MD;  [facility]: [facility]; Service: Gastroenterology   • OH LAMNOTMY INCL W/DCMPRSN NRV ROOT 1 INTRSPC LUMBR Left 11/22/2019    Procedure: LAMINECTOMY LUMBAR/THORACIC; L4/5 microdiscectomy;  Surgeon: Oma Galindo MD;  Location:  MAIN OR;  Service: Neurosurgery   • TONSILLECTOMY         Family History   Problem Relation Age of Onset   • Diabetes Father          Medications have been verified  Objective   /62 (BP Location: Left arm, Patient Position: Sitting)   Pulse 72   Temp 98 3 °F (36 8 °C)   Resp 18   Ht 5' 11\" (1 803 m)   Wt 96 6 kg (213 lb)   SpO2 98%   BMI 29 71 kg/m²        Physical Exam     Physical Exam  Vitals and nursing note reviewed  Constitutional:       General: He is not in acute distress  Appearance: Normal appearance  He is not ill-appearing, toxic-appearing or diaphoretic  Comments: 41 yo w male sitting on stretcher with swollen/red conjunctiva on the left  HENT:      Head: Normocephalic and atraumatic  Right Ear: Tympanic membrane normal       Left Ear: Tympanic membrane normal       Nose: Nose normal       Mouth/Throat:      Mouth: Mucous membranes are moist       Pharynx: Oropharynx is clear  No oropharyngeal exudate or posterior oropharyngeal erythema  Eyes:      Extraocular Movements: Extraocular movements intact  Pupils: Pupils are equal, round, and reactive to light  Neck:      Vascular: No carotid bruit  Cardiovascular:      Rate and Rhythm: Normal rate and regular rhythm  Pulses: Normal pulses  Heart sounds: Normal heart sounds  Pulmonary:      Effort: Pulmonary effort is normal       Breath sounds: Normal breath sounds  Abdominal:      General: Abdomen is flat  Bowel sounds are normal  There is no distension  Palpations: Abdomen is soft  There is no mass  Tenderness: There is no abdominal tenderness  There is no right CVA tenderness, left CVA tenderness, guarding or rebound        Hernia: No hernia is " present  Musculoskeletal:         General: No swelling, tenderness, deformity or signs of injury  Normal range of motion  Cervical back: Normal range of motion and neck supple  No rigidity  No muscular tenderness  Right lower leg: No edema  Left lower leg: No edema  Lymphadenopathy:      Cervical: No cervical adenopathy  Skin:     General: Skin is warm and dry  Coloration: Skin is not jaundiced or pale  Findings: No bruising, erythema, lesion or rash  Neurological:      General: No focal deficit present  Mental Status: He is alert and oriented to person, place, and time  Cranial Nerves: No cranial nerve deficit  Motor: No weakness     Psychiatric:         Mood and Affect: Mood normal

## 2023-04-25 NOTE — LETTER
April 25, 2023     Patient: Parmjit Mahoney   YOB: 1985   Date of Visit: 4/25/2023       To Whom It May Concern: It is my medical opinion that Parmjit Mahoney may return to work on 04/26/2023  If you have any questions or concerns, please don't hesitate to call           Sincerely,        Michelle Rowe DO    CC: No Recipients

## 2023-08-28 ENCOUNTER — APPOINTMENT (OUTPATIENT)
Age: 38
End: 2023-08-28
Payer: COMMERCIAL

## 2023-08-28 ENCOUNTER — OFFICE VISIT (OUTPATIENT)
Age: 38
End: 2023-08-28
Payer: COMMERCIAL

## 2023-08-28 VITALS
SYSTOLIC BLOOD PRESSURE: 102 MMHG | RESPIRATION RATE: 20 BRPM | OXYGEN SATURATION: 100 % | DIASTOLIC BLOOD PRESSURE: 64 MMHG | TEMPERATURE: 98.3 F | HEART RATE: 78 BPM

## 2023-08-28 DIAGNOSIS — M25.511 ACUTE PAIN OF RIGHT SHOULDER: Primary | ICD-10-CM

## 2023-08-28 DIAGNOSIS — M54.2 CERVICALGIA: ICD-10-CM

## 2023-08-28 DIAGNOSIS — M25.511 ACUTE PAIN OF RIGHT SHOULDER: ICD-10-CM

## 2023-08-28 PROCEDURE — 99213 OFFICE O/P EST LOW 20 MIN: CPT | Performed by: PHYSICIAN ASSISTANT

## 2023-08-28 PROCEDURE — 72040 X-RAY EXAM NECK SPINE 2-3 VW: CPT

## 2023-08-28 PROCEDURE — 73030 X-RAY EXAM OF SHOULDER: CPT

## 2023-08-28 RX ORDER — IBUPROFEN 800 MG/1
800 TABLET ORAL EVERY 8 HOURS PRN
Qty: 30 TABLET | Refills: 0 | Status: SHIPPED | OUTPATIENT
Start: 2023-08-28

## 2023-08-28 RX ORDER — METHOCARBAMOL 500 MG/1
500 TABLET, FILM COATED ORAL 2 TIMES DAILY PRN
Qty: 10 TABLET | Refills: 0 | Status: SHIPPED | OUTPATIENT
Start: 2023-08-28

## 2023-08-28 NOTE — PROGRESS NOTES
[facility]        NAME: Priya Norton is a 41 y.o. male  : 1985    MRN: [id number]  DATE: 2023  TIME: 6:14 PM    Assessment and Plan   Acute pain of right shoulder [M25.511]  1. Acute pain of right shoulder  XR shoulder 2+ vw right    ibuprofen (MOTRIN) 800 mg tablet    methocarbamol (ROBAXIN) 500 mg tablet    Ambulatory Referral to Physical Therapy      2. Cervicalgia  XR spine cervical 2 or 3 vw injury    ibuprofen (MOTRIN) 800 mg tablet    methocarbamol (ROBAXIN) 500 mg tablet    Ambulatory Referral to Physical Therapy            Patient Instructions     X-rays of your cervical spine and shoulder reviewed no acute findings however we will wait for the official report and if there are any significant findings I will contact you to develop a new treatment plan. Robaxin 500 mg 1 tablet every 12 hours as needed. No driving while on this medication or use of alcohol. Ibuprofen 800 mg 1 tablet every 6-8 hours as needed for pain    Follow up with PCP in 3-5 days. Proceed to  ER if symptoms worsen. Chief Complaint     Chief Complaint   Patient presents with   • Neck Pain   • Shoulder Pain     Patient states for two weeks he has right arm and neck pain. He say's the pain is excruciating, he states he has headaches following as well. History of Present Illness       40 yo male with c/o right shoulder and base of neck pain x 2 weeks. Denies prior injury, trauma, or surgery. Pain is a 8/10, constant, worsen at night, aggravated on movement of the shoulder and upper right arm. Alleviating with Ibuprofen and or Naprosyn, warm compresses. Review of Systems   Review of Systems   Constitutional: Negative for activity change, appetite change, diaphoresis, fatigue and fever. Eyes: Negative for visual disturbance. Respiratory: Negative for cough, chest tightness, shortness of breath and wheezing. Cardiovascular: Negative for chest pain and palpitations. Gastrointestinal: Negative for abdominal pain, diarrhea, nausea and vomiting. Musculoskeletal: Negative for myalgias. Skin: Negative for color change, rash and wound. Neurological: Positive for headaches. Negative for dizziness, weakness, light-headedness and numbness. Current Medications       Current Outpatient Medications:   •  ibuprofen (MOTRIN) 800 mg tablet, Take 1 tablet (800 mg total) by mouth every 8 (eight) hours as needed for mild pain, Disp: 30 tablet, Rfl: 0  •  methocarbamol (ROBAXIN) 500 mg tablet, Take 1 tablet (500 mg total) by mouth 2 (two) times a day as needed for muscle spasms Do not drive or consume alcohol while on this medication, Disp: 10 tablet, Rfl: 0  •  fluticasone (FLONASE) 50 mcg/act nasal spray, 2 sprays into each nostril daily (Patient not taking: Reported on 4/25/2023), Disp: 16 g, Rfl: 3  •  ofloxacin (OCUFLOX) 0.3 % ophthalmic solution, Administer 2 drops to both eyes 4 (four) times a day, Disp: 5 mL, Rfl: 0  •  pantoprazole (PROTONIX) 40 mg tablet, Take 1 tablet (40 mg total) by mouth daily before breakfast (Patient not taking: Reported on 4/25/2023), Disp: 60 tablet, Rfl: 5  •  sucralfate (CARAFATE) 1 g tablet, Take 1 tablet (1 g total) by mouth 4 (four) times a day Crush tablet and mix with 10 ML water to make a slurry. , Disp: 120 tablet, Rfl: 0    Current Allergies     Allergies as of 08/28/2023   • (No Known Allergies)            The following portions of the patient's history were reviewed and updated as appropriate: allergies, current medications, past family history, past medical history, past social history, past surgical history and problem list.     Past Medical History:   Diagnosis Date   • Anal abscess 3/12/2019   • 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   • GERD (gastroesophageal reflux disease)        Past Surgical History:   Procedure Laterality Date   • ABCESS DRAINAGE  10/29/2018    carlos anal   • ANAL FISTULOTOMY N/A 3/14/2019    Procedure: FISTULOTOMY;  Surgeon: Pilo Shelton MD;  [facility];  Service: Colorectal   • COLONOSCOPY     • IL COLONOSCOPY FLX DX W/COLLJ SPEC WHEN PFRMD N/A 11/21/2018    Procedure: COLONOSCOPY;  Surgeon: Yovanny Jon MD;  [facility]; Service: Gastroenterology   • IL ESOPHAGOGASTRODUODENOSCOPY TRANSORAL DIAGNOSTIC N/A 11/21/2018    Procedure: ESOPHAGOGASTRODUODENOSCOPY (EGD); Surgeon: Yovanny Jon MD;  [facility]; Service: Gastroenterology   • IL ESOPHAGOGASTRODUODENOSCOPY TRANSORAL DIAGNOSTIC N/A 2/7/2019    Procedure: ESOPHAGOGASTRODUODENOSCOPY (EGD); Surgeon: Yovanny Jon MD;  [facility]; Service: Gastroenterology   • IL LAMNOTMY INCL W/DCMPRSN NRV ROOT 1 INTRSPC LUMBR Left 11/22/2019    Procedure: LAMINECTOMY LUMBAR/THORACIC; L4/5 microdiscectomy;  Surgeon: Margaret Henry MD;  Location:  MAIN OR;  Service: Neurosurgery   • TONSILLECTOMY         Family History   Problem Relation Age of Onset   • Diabetes Father          Medications have been verified. Objective   /64   Pulse 78   Temp 98.3 °F (36.8 °C)   Resp 20   SpO2 100%        Physical Exam     Physical Exam  Vitals and nursing note reviewed. Constitutional:       General: He is not in acute distress. Appearance: Normal appearance. He is not ill-appearing. HENT:      Right Ear: Tympanic membrane, ear canal and external ear normal.      Left Ear: Tympanic membrane, ear canal and external ear normal.      Nose: Nose normal.      Mouth/Throat:      Mouth: Mucous membranes are moist.      Pharynx: Oropharynx is clear. Eyes:      General: No scleral icterus. Extraocular Movements: Extraocular movements intact. Conjunctiva/sclera: Conjunctivae normal.      Pupils: Pupils are equal, round, and reactive to light. Cardiovascular:      Rate and Rhythm: Normal rate and regular rhythm. Pulses: Normal pulses. Heart sounds: Normal heart sounds.    Pulmonary:      Effort: Pulmonary effort is normal. No respiratory distress. Breath sounds: Normal breath sounds. Musculoskeletal:         General: Normal range of motion. Cervical back: Normal range of motion. Rigidity and tenderness present. Comments: Cervical spine: Limited range of pushing in most planes due to tenderness. Tenderness localized to the base of the neck, right side. TPT on palpation of the area noted. Right Shoulder: Limited range of motion on flexion external rotation due to tenderness. Positive Smith and Neer's.  is 5/5. Pulses 2+ and symmetric. Lymphadenopathy:      Cervical: No cervical adenopathy. Skin:     General: Skin is warm and dry. Neurological:      General: No focal deficit present. Mental Status: He is alert and oriented to person, place, and time. Coordination: Coordination normal.      Gait: Gait normal.   Psychiatric:         Mood and Affect: Mood normal.         Behavior: Behavior normal.         Thought Content:  Thought content normal.         Judgment: Judgment normal.

## 2024-02-12 ENCOUNTER — OFFICE VISIT (OUTPATIENT)
Age: 39
End: 2024-02-12
Payer: COMMERCIAL

## 2024-02-12 VITALS
TEMPERATURE: 96.6 F | HEART RATE: 105 BPM | WEIGHT: 222.4 LBS | SYSTOLIC BLOOD PRESSURE: 112 MMHG | DIASTOLIC BLOOD PRESSURE: 74 MMHG | RESPIRATION RATE: 18 BRPM | OXYGEN SATURATION: 98 % | BODY MASS INDEX: 31.02 KG/M2

## 2024-02-12 DIAGNOSIS — R50.9 FEVER, UNSPECIFIED FEVER CAUSE: Primary | ICD-10-CM

## 2024-02-12 DIAGNOSIS — R68.89 FLU-LIKE SYMPTOMS: ICD-10-CM

## 2024-02-12 DIAGNOSIS — J02.9 SORE THROAT: ICD-10-CM

## 2024-02-12 LAB
S PYO AG THROAT QL: NEGATIVE
SARS-COV-2 AG UPPER RESP QL IA: NEGATIVE
VALID CONTROL: NORMAL

## 2024-02-12 PROCEDURE — 87880 STREP A ASSAY W/OPTIC: CPT | Performed by: PHYSICIAN ASSISTANT

## 2024-02-12 PROCEDURE — 87811 SARS-COV-2 COVID19 W/OPTIC: CPT | Performed by: PHYSICIAN ASSISTANT

## 2024-02-12 PROCEDURE — 99213 OFFICE O/P EST LOW 20 MIN: CPT | Performed by: PHYSICIAN ASSISTANT

## 2024-02-12 PROCEDURE — 87070 CULTURE OTHR SPECIMN AEROBIC: CPT | Performed by: PHYSICIAN ASSISTANT

## 2024-02-12 RX ORDER — OSELTAMIVIR PHOSPHATE 75 MG/1
75 CAPSULE ORAL EVERY 12 HOURS SCHEDULED
Qty: 10 CAPSULE | Refills: 0 | Status: SHIPPED | OUTPATIENT
Start: 2024-02-12 | End: 2024-02-17

## 2024-02-12 NOTE — LETTER
February 12, 2024     Patient: Abdifatah Medina   YOB: 1985   Date of Visit: 2/12/2024       To Whom it May Concern:    Abdifatah Medina was seen in my clinic on 2/12/2024. He may return to work on 2/15/2024 .    If you have any questions or concerns, please don't hesitate to call.         Sincerely,          Raffi Melchor PA-C        CC: No Recipients

## 2024-02-12 NOTE — PROGRESS NOTES
[facility]        NAME: Abdifatah Medina is a 38 y.o. male  : 1985    MRN: [id number]  DATE: 2024  TIME: 2:27 PM    Assessment and Plan   Fever, unspecified fever cause [R50.9]  1. Fever, unspecified fever cause  Covid19 and INFLUENZA A/B PCR    oseltamivir (TAMIFLU) 75 mg capsule      2. Sore throat  POCT rapid strepA    Poct Covid 19 Rapid Antigen Test    Throat culture    oseltamivir (TAMIFLU) 75 mg capsule      3. Flu-like symptoms              Patient Instructions       Follow up with PCP in 3-5 days.  Proceed to  ER if symptoms worsen.    Chief Complaint     Chief Complaint   Patient presents with    Sore Throat     Runny nose, cough, sore throat, ears clogged X 1 day         History of Present Illness       Fever        Review of Systems   Review of Systems      Current Medications       Current Outpatient Medications:     ibuprofen (MOTRIN) 800 mg tablet, Take 1 tablet (800 mg total) by mouth every 8 (eight) hours as needed for mild pain, Disp: 30 tablet, Rfl: 0    oseltamivir (TAMIFLU) 75 mg capsule, Take 1 capsule (75 mg total) by mouth every 12 (twelve) hours for 5 days, Disp: 10 capsule, Rfl: 0    fluticasone (FLONASE) 50 mcg/act nasal spray, 2 sprays into each nostril daily (Patient not taking: Reported on 2023), Disp: 16 g, Rfl: 3    methocarbamol (ROBAXIN) 500 mg tablet, Take 1 tablet (500 mg total) by mouth 2 (two) times a day as needed for muscle spasms Do not drive or consume alcohol while on this medication (Patient not taking: Reported on 2024), Disp: 10 tablet, Rfl: 0    ofloxacin (OCUFLOX) 0.3 % ophthalmic solution, Administer 2 drops to both eyes 4 (four) times a day (Patient not taking: Reported on 2024), Disp: 5 mL, Rfl: 0    pantoprazole (PROTONIX) 40 mg tablet, Take 1 tablet (40 mg total) by mouth daily before breakfast (Patient not taking: Reported on 2023), Disp: 60 tablet, Rfl: 5    sucralfate (CARAFATE) 1 g tablet, Take 1 tablet (1 g  total) by mouth 4 (four) times a day Crush tablet and mix with 10 ML water to make a slurry., Disp: 120 tablet, Rfl: 0    Current Allergies     Allergies as of 02/12/2024    (No Known Allergies)            The following portions of the patient's history were reviewed and updated as appropriate: allergies, current medications, past family history, past medical history, past social history, past surgical history and problem list.     Past Medical History:   Diagnosis Date    Anal abscess 3/12/2019    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    GERD (gastroesophageal reflux disease)        Past Surgical History:   Procedure Laterality Date    ABCESS DRAINAGE  10/29/2018    carlos anal    ANAL FISTULOTOMY N/A 3/14/2019    Procedure: FISTULOTOMY;  Surgeon: Joey Dickey MD;  [facility];  Service: Colorectal    COLONOSCOPY      DC COLONOSCOPY FLX DX W/COLLJ SPEC WHEN PFRMD N/A 11/21/2018    Procedure: COLONOSCOPY;  Surgeon: Dylan Darden MD;  [facility];  Service: Gastroenterology    DC ESOPHAGOGASTRODUODENOSCOPY TRANSORAL DIAGNOSTIC N/A 11/21/2018    Procedure: ESOPHAGOGASTRODUODENOSCOPY (EGD);  Surgeon: Dylan Darden MD;  [facility];  Service: Gastroenterology    DC ESOPHAGOGASTRODUODENOSCOPY TRANSORAL DIAGNOSTIC N/A 2/7/2019    Procedure: ESOPHAGOGASTRODUODENOSCOPY (EGD);  Surgeon: Dylan Darden MD;  [facility];  Service: Gastroenterology    DC LAMNOTMY INCL W/DCMPRSN NRV ROOT 1 INTRSPC LUMBR Left 11/22/2019    Procedure: LAMINECTOMY LUMBAR/THORACIC; L4/5 microdiscectomy;  Surgeon: Melissa Ratliff MD;  Location:  MAIN OR;  Service: Neurosurgery    TONSILLECTOMY         Family History   Problem Relation Age of Onset    Diabetes Father          Medications have been verified.        Objective   /74 (BP Location: Right arm, Patient Position: Sitting, Cuff Size: Adult)   Pulse 105   Temp (!) 96.6 °F (35.9 °C) (Tympanic)   Resp 18   Wt 101 kg (222 lb 6.4 oz)   SpO2 98%   BMI  31.02 kg/m²        Physical Exam     Physical Exam  Vitals and nursing note reviewed.   Constitutional:       General: He is not in acute distress.     Appearance: Normal appearance. He is well-developed. He is ill-appearing. He is not toxic-appearing or diaphoretic.   HENT:      Head: Normocephalic and atraumatic.      Right Ear: Tympanic membrane, ear canal and external ear normal.      Left Ear: Tympanic membrane, ear canal and external ear normal.      Nose: Congestion and rhinorrhea present.      Mouth/Throat:      Mouth: Mucous membranes are moist. Oral lesions present.      Pharynx: Pharyngeal swelling, posterior oropharyngeal erythema and uvula swelling present. No oropharyngeal exudate.      Tonsils: No tonsillar exudate or tonsillar abscesses. 0 on the right. 0 on the left.   Eyes:      General: No scleral icterus.     Extraocular Movements: Extraocular movements intact.      Right eye: Normal extraocular motion.      Left eye: Normal extraocular motion.      Conjunctiva/sclera: Conjunctivae normal.      Pupils: Pupils are equal, round, and reactive to light.   Neck:      Thyroid: No thyromegaly.   Cardiovascular:      Rate and Rhythm: Normal rate and regular rhythm.      Pulses: Normal pulses.      Heart sounds: Normal heart sounds.   Pulmonary:      Effort: Pulmonary effort is normal. No respiratory distress.      Breath sounds: Normal breath sounds.   Abdominal:      General: Abdomen is flat. Bowel sounds are normal.      Palpations: Abdomen is soft. There is no mass.      Tenderness: There is no abdominal tenderness. There is no guarding or rebound.   Musculoskeletal:         General: Normal range of motion.      Cervical back: Normal range of motion and neck supple. No tenderness.   Lymphadenopathy:      Cervical: No cervical adenopathy.   Skin:     General: Skin is warm and dry.      Findings: No rash.   Neurological:      General: No focal deficit present.      Mental Status: He is alert and  oriented to person, place, and time.      Gait: Gait normal.   Psychiatric:         Mood and Affect: Mood normal.         Behavior: Behavior normal.         Thought Content: Thought content normal.         Judgment: Judgment normal.

## 2024-02-13 LAB
FLUAV RNA RESP QL NAA+PROBE: NEGATIVE
FLUBV RNA RESP QL NAA+PROBE: NEGATIVE
SARS-COV-2 RNA RESP QL NAA+PROBE: NEGATIVE

## 2024-02-14 ENCOUNTER — OFFICE VISIT (OUTPATIENT)
Age: 39
End: 2024-02-14
Payer: COMMERCIAL

## 2024-02-14 VITALS
WEIGHT: 222 LBS | HEIGHT: 71 IN | DIASTOLIC BLOOD PRESSURE: 58 MMHG | HEART RATE: 97 BPM | RESPIRATION RATE: 18 BRPM | TEMPERATURE: 97.7 F | OXYGEN SATURATION: 98 % | SYSTOLIC BLOOD PRESSURE: 126 MMHG | BODY MASS INDEX: 31.08 KG/M2

## 2024-02-14 DIAGNOSIS — J20.9 ACUTE BRONCHITIS, UNSPECIFIED ORGANISM: Primary | ICD-10-CM

## 2024-02-14 LAB — BACTERIA THROAT CULT: NORMAL

## 2024-02-14 PROCEDURE — 99213 OFFICE O/P EST LOW 20 MIN: CPT | Performed by: PHYSICIAN ASSISTANT

## 2024-02-14 RX ORDER — ALBUTEROL SULFATE 90 UG/1
2 AEROSOL, METERED RESPIRATORY (INHALATION) EVERY 6 HOURS PRN
Qty: 6.7 G | Refills: 0 | Status: SHIPPED | OUTPATIENT
Start: 2024-02-14 | End: 2024-02-22

## 2024-02-14 RX ORDER — AZITHROMYCIN 250 MG/1
TABLET, FILM COATED ORAL
Qty: 6 TABLET | Refills: 0 | Status: SHIPPED | OUTPATIENT
Start: 2024-02-14 | End: 2024-02-18

## 2024-02-14 NOTE — PROGRESS NOTES
[facility]        NAME: Abdifatah Medina is a 38 y.o. male  : 1985    MRN: [id number]  DATE: 2024  TIME: 1:36 PM    Assessment and Plan   Acute bronchitis, unspecified organism [J20.9]  1. Acute bronchitis, unspecified organism  dextromethorphan-guaifenesin (MUCINEX DM)  MG per 12 hr tablet    azithromycin (ZITHROMAX) 250 mg tablet    albuterol (Proventil HFA) 90 mcg/act inhaler            Patient Instructions       Follow up with PCP in 3-5 days.  Proceed to  ER if symptoms worsen.    Chief Complaint     Chief Complaint   Patient presents with    Cold Like Symptoms     Patient states that he was seen 2 days ago for flu like symptoms. States that symptoms has gotten worse, complains of cough, chest pain/burning, sore throat, wheezing, runny nose, body aches, headaches, chills.          History of Present Illness       Cough  This is a new problem. The current episode started 1 to 4 weeks ago. The problem has been gradually worsening. The cough is Productive of sputum. Associated symptoms include myalgias, nasal congestion, postnasal drip, rhinorrhea and wheezing. Pertinent negatives include no chills, fever, hemoptysis, rash or sore throat. The symptoms are aggravated by lying down and cold air. He has tried prescription cough suppressant for the symptoms. The treatment provided mild relief.       Review of Systems   Review of Systems   Constitutional:  Negative for chills and fever.   HENT:  Positive for postnasal drip and rhinorrhea. Negative for sore throat.    Respiratory:  Positive for cough, chest tightness and wheezing. Negative for hemoptysis.    Musculoskeletal:  Positive for myalgias.   Skin:  Negative for rash.         Current Medications       Current Outpatient Medications:     albuterol (Proventil HFA) 90 mcg/act inhaler, Inhale 2 puffs every 6 (six) hours as needed for wheezing or shortness of breath, Disp: 6.7 g, Rfl: 0    azithromycin (ZITHROMAX) 250 mg tablet,  Take 2 tablets today then 1 tablet daily x 4 days, Disp: 6 tablet, Rfl: 0    dextromethorphan-guaifenesin (MUCINEX DM)  MG per 12 hr tablet, Take 1 tablet by mouth every 12 (twelve) hours, Disp: 18 tablet, Rfl: 0    fluticasone (FLONASE) 50 mcg/act nasal spray, 2 sprays into each nostril daily (Patient not taking: Reported on 4/25/2023), Disp: 16 g, Rfl: 3    ibuprofen (MOTRIN) 800 mg tablet, Take 1 tablet (800 mg total) by mouth every 8 (eight) hours as needed for mild pain (Patient not taking: Reported on 2/14/2024), Disp: 30 tablet, Rfl: 0    methocarbamol (ROBAXIN) 500 mg tablet, Take 1 tablet (500 mg total) by mouth 2 (two) times a day as needed for muscle spasms Do not drive or consume alcohol while on this medication (Patient not taking: Reported on 2/12/2024), Disp: 10 tablet, Rfl: 0    ofloxacin (OCUFLOX) 0.3 % ophthalmic solution, Administer 2 drops to both eyes 4 (four) times a day (Patient not taking: Reported on 2/12/2024), Disp: 5 mL, Rfl: 0    oseltamivir (TAMIFLU) 75 mg capsule, Take 1 capsule (75 mg total) by mouth every 12 (twelve) hours for 5 days (Patient not taking: Reported on 2/14/2024), Disp: 10 capsule, Rfl: 0    pantoprazole (PROTONIX) 40 mg tablet, Take 1 tablet (40 mg total) by mouth daily before breakfast (Patient not taking: Reported on 4/25/2023), Disp: 60 tablet, Rfl: 5    sucralfate (CARAFATE) 1 g tablet, Take 1 tablet (1 g total) by mouth 4 (four) times a day Crush tablet and mix with 10 ML water to make a slurry., Disp: 120 tablet, Rfl: 0    Current Allergies     Allergies as of 02/14/2024    (No Known Allergies)            The following portions of the patient's history were reviewed and updated as appropriate: allergies, current medications, past family history, past medical history, past social history, past surgical history and problem list.     Past Medical History:   Diagnosis Date    Anal abscess 3/12/2019    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    GERD (gastroesophageal reflux  "disease)        Past Surgical History:   Procedure Laterality Date    ABCESS DRAINAGE  10/29/2018    carlos anal    ANAL FISTULOTOMY N/A 3/14/2019    Procedure: FISTULOTOMY;  Surgeon: Joey Dickey MD;  Location: [facility];  Service: Colorectal    COLONOSCOPY      IA COLONOSCOPY FLX DX W/COLLJ SPEC WHEN PFRMD N/A 11/21/2018    Procedure: COLONOSCOPY;  Surgeon: Dylan Darden MD;  [facility]: [facility];  Service: Gastroenterology    IA ESOPHAGOGASTRODUODENOSCOPY TRANSORAL DIAGNOSTIC N/A 11/21/2018    Procedure: ESOPHAGOGASTRODUODENOSCOPY (EGD);  Surgeon: Dylan Darden MD;  [facility];  Service: Gastroenterology    IA ESOPHAGOGASTRODUODENOSCOPY TRANSORAL DIAGNOSTIC N/A 2/7/2019    Procedure: ESOPHAGOGASTRODUODENOSCOPY (EGD);  Surgeon: Dylan Darden MD;  [facility];  Service: Gastroenterology    IA LAMNOTMY INCL W/DCMPRSN NRV ROOT 1 INTRSPC LUMBR Left 11/22/2019    Procedure: LAMINECTOMY LUMBAR/THORACIC; L4/5 microdiscectomy;  Surgeon: Melissa Ratliff MD;  Location:  MAIN OR;  Service: Neurosurgery    TONSILLECTOMY         Family History   Problem Relation Age of Onset    Diabetes Father          Medications have been verified.        Objective   /58   Pulse 97   Temp 97.7 °F (36.5 °C)   Resp 18   Ht 5' 11\" (1.803 m)   Wt 101 kg (222 lb)   SpO2 98%   BMI 30.96 kg/m²        Physical Exam     Physical Exam  Vitals and nursing note reviewed.   Constitutional:       General: He is not in acute distress.     Appearance: Normal appearance. He is not ill-appearing, toxic-appearing or diaphoretic.   HENT:      Head: Normocephalic and atraumatic.      Right Ear: Tympanic membrane, ear canal and external ear normal.      Left Ear: Tympanic membrane, ear canal and external ear normal.      Nose: Congestion and rhinorrhea present.      Mouth/Throat:      Mouth: Mucous membranes are moist.      Pharynx: Oropharynx is clear. No oropharyngeal exudate or posterior oropharyngeal erythema. "   Eyes:      General: No scleral icterus.     Extraocular Movements: Extraocular movements intact.      Conjunctiva/sclera: Conjunctivae normal.      Pupils: Pupils are equal, round, and reactive to light.   Cardiovascular:      Rate and Rhythm: Normal rate and regular rhythm.      Pulses: Normal pulses.      Heart sounds: Normal heart sounds.   Pulmonary:      Effort: Pulmonary effort is normal. No respiratory distress.      Breath sounds: Wheezing and rhonchi present.   Musculoskeletal:         General: Normal range of motion.      Cervical back: Normal range of motion and neck supple. No tenderness.   Lymphadenopathy:      Cervical: No cervical adenopathy.   Skin:     General: Skin is warm and dry.      Findings: No rash.   Neurological:      General: No focal deficit present.      Mental Status: He is alert and oriented to person, place, and time.      Coordination: Coordination normal.      Gait: Gait normal.   Psychiatric:         Mood and Affect: Mood normal.         Behavior: Behavior normal.         Thought Content: Thought content normal.         Judgment: Judgment normal.

## 2024-02-22 ENCOUNTER — OFFICE VISIT (OUTPATIENT)
Age: 39
End: 2024-02-22
Payer: COMMERCIAL

## 2024-02-22 VITALS
HEART RATE: 100 BPM | SYSTOLIC BLOOD PRESSURE: 112 MMHG | OXYGEN SATURATION: 97 % | BODY MASS INDEX: 31.08 KG/M2 | WEIGHT: 222 LBS | HEIGHT: 71 IN | DIASTOLIC BLOOD PRESSURE: 70 MMHG

## 2024-02-22 DIAGNOSIS — K21.00 GASTROESOPHAGEAL REFLUX DISEASE WITH ESOPHAGITIS WITHOUT HEMORRHAGE: ICD-10-CM

## 2024-02-22 DIAGNOSIS — R13.19 ESOPHAGEAL DYSPHAGIA: Primary | ICD-10-CM

## 2024-02-22 DIAGNOSIS — R19.8 UMBILICAL BLEEDING: ICD-10-CM

## 2024-02-22 PROCEDURE — 99214 OFFICE O/P EST MOD 30 MIN: CPT | Performed by: INTERNAL MEDICINE

## 2024-02-22 RX ORDER — PANTOPRAZOLE SODIUM 40 MG/1
40 TABLET, DELAYED RELEASE ORAL
Qty: 60 TABLET | Refills: 2 | Status: SHIPPED | OUTPATIENT
Start: 2024-02-22

## 2024-02-22 NOTE — PROGRESS NOTES
[facility]      Chief Complaint: Swallowing issues    HPI:  Abdifatah Medina is a 38 y.o.  male who presents with dysphagia.  Patient has a history of GERD with erosions esophagitis and stricture.  He has not been taking any medication.  He continues to smoke.  He did have some improvement with his lower GI symptomatology when he started eating a better diet.  He has no weight loss.  No vomiting.  No chest pain.  We reviewed his last EGD and colonoscopy.  We also reviewed the MRI from February for the bleeding from the umbilicus.  He is seeing Harry occur again.  He went to see his surgeon because of his hiatal hernia.  He thought he would have it fixed but he thought he was going to get the surgery and this was not advised at that time.  The patient apparently would like to see another surgeon.  He is not having any additional symptomatology over the last time..      Review of Systems:   Constitutional: No fever or chills, feels poorly, no tiredness, no recent weight gain or weight loss.   HENT: No complaints of earache, no hearing loss, no nosebleeds, no nasal discharge, no sore throat, no hoarseness.    Eyes: No complaints of eye pain, no red eyes, no discharge from eyes, no itchy eyes.  Cardiovascular: No complaints of slow heart rate, no fast heart rate, no chest pain, no palpitations, no leg claudication, no lower extremity edema.   Respiratory: No complaints of shortness of breath, no wheezing, no cough, no SOB on exertion, no orthopnea.   Gastrointestinal: As noted in HPI  Genitourinary: No complaints of dysuria, no incontinence, no hesitancy, no nocturia.   Musculoskeletal: No complaints of arthralgia, no myalgias, no joint swelling or stiffness, no limb pain or swelling.   Neurological: No complaints of headache, no confusion, no convulsions, no numbness or tingling, no dizziness or fainting, no limb weakness, no difficulty walking.    Skin: No complaints of skin rash or skin  lesions, no itching, no skin wound, no dry skin.    Hematological/Lymphatic: No complaints of swollen glands, does not bleed easy.   Allergic/Immunologic: No immunocompromised state.  Endocrine:  No complaints of polyuria, no polydipsia.   Psychiatric/Behavioral: is not suicidal, no sleep disturbances, no anxiety or depression, no change in personality, no emotional problems.       Historical Information   Past Medical History:   Diagnosis Date    Anal abscess 03/12/2019    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    Gomez esophagus     Diverticulitis of colon     Esophageal stricture     GERD (gastroesophageal reflux disease)     Hernia     Irritable bowel syndrome      Past Surgical History:   Procedure Laterality Date    ABCESS DRAINAGE  10/29/2018    carlos anal    ANAL FISTULOTOMY N/A 03/14/2019    Procedure: FISTULOTOMY;  Surgeon: Joey Dickey MD;  [facility];  Service: Colorectal    COLONOSCOPY      TX COLONOSCOPY FLX DX W/COLLJ SPEC WHEN PFRMD N/A 11/21/2018    Procedure: COLONOSCOPY;  Surgeon: Dylan Darden MD;  [facility];  Service: Gastroenterology    TX ESOPHAGOGASTRODUODENOSCOPY TRANSORAL DIAGNOSTIC N/A 11/21/2018    Procedure: ESOPHAGOGASTRODUODENOSCOPY (EGD);  Surgeon: Dylan Darden MD;  [facility];  Service: Gastroenterology    TX ESOPHAGOGASTRODUODENOSCOPY TRANSORAL DIAGNOSTIC N/A 02/07/2019    Procedure: ESOPHAGOGASTRODUODENOSCOPY (EGD);  Surgeon: Dylan Darden MD;  [facility];  Service: Gastroenterology    TX LAMNOTMY INCL W/DCMPRSN NRV ROOT 1 INTRSPC LUMBR Left 11/22/2019    Procedure: LAMINECTOMY LUMBAR/THORACIC; L4/5 microdiscectomy;  Surgeon: Melissa Ratliff MD;  Location:  MAIN OR;  Service: Neurosurgery    TONSILLECTOMY      UPPER GASTROINTESTINAL ENDOSCOPY       Social History   Social History     Substance and Sexual Activity   Alcohol Use Not Currently    Comment: very rarely     Social History     Substance and Sexual Activity   Drug Use Yes     "Frequency: 2.0 times per week    Types: Marijuana    Comment: weekly     Social History     Tobacco Use   Smoking Status Every Day    Current packs/day: 1.00    Average packs/day: 1 pack/day for 15.0 years (15.0 ttl pk-yrs)    Types: Cigarettes   Smokeless Tobacco Never     Family History   Problem Relation Age of Onset    Diabetes Father          Current Medications: has a current medication list which includes the following prescription(s): pantoprazole.        Vital Signs: /70   Pulse 100   Ht 5' 11\" (1.803 m)   Wt 101 kg (222 lb)   SpO2 97%   BMI 30.96 kg/m²       Physical Exam:   Constitutional  General Appearance: No acute distress, well appearing and well nourished  Head  Normocephalic  Eyes  Conjunctivae and lids: No swelling, erythema, or discharge.    Pupils and irises: Equal, round and reactive to light.   Ears, Nose, Mouth, and Throat  External inspection of ears and nose: Normal  Nasal mucosa, septum and turbinates: Normal without edema or erythema/   Oropharynx: Normal with no erythema, edema, exudate or lesions.   Neck  Normal range of motion. Neck supple.   Cardiovascular  Auscultation of the heart: Normal rate and rhythm, normal S1 and S2 without murmurs.  Examination of the extremities for edema and/or varicosities: Normal  Pulmonary/Chest  Respiratory effort: No increased work of breathing or signs of respiratory distress.   Auscultation of lungs: Clear to auscultation, equal breath sounds bilaterally, no wheezes, rales, no rhonchi.   Abdomen  Abdomen: Non-tender, no masses.   Liver and spleen: No hepatomegaly or splenomegaly.   Musculoskeletal  Gait and station: normal.  Digits and Nails: normal without clubbing or cyanosis.  Inspection/palpation of joints, bones, and muscles: Normal  Neurological  No nystagmus or asterixis.   Skin  Skin and subcutaneous tissue: Normal without rashes or lesions.   Lymphatic  Palpation of the lymph nodes in neck: No lymphadenopathy. "   Psychiatric  Orientation to person, place and time: Normal.  Mood and affect: Normal.         Labs:  Lab Results   Component Value Date    ALT 29 11/12/2022    AST 17 11/12/2022    BUN 11 11/12/2022    CALCIUM 9.3 11/12/2022     11/12/2022    CO2 26 11/12/2022    CREATININE 0.76 11/12/2022    HCT 43.6 11/12/2022    HGB 14.9 11/12/2022    HGBA1C 5.4 10/28/2019     11/12/2022    K 4.3 11/12/2022    WBC 10.36 (H) 11/12/2022         X-Rays & Procedures:   No orders to display       MRI pelvis perianal fistula wo and w contrast  Status: Final result     PACS Images     Show images for MRI pelvis perianal fistula wo and w contrast  Study Result    Narrative & Impression   MRI OF THE PELVIS WITH AND WITHOUT CONTRAST  ( PERIANAL FISTULA)     INDICATION:  Perianal fistula protocol for abnormal CT.     COMPARISON: CT enterography 2/3/2022.     TECHNIQUE: The following pulse sequences were obtained:  Axial and coronal T1 and T2, coronal T2 with fat saturation, axial T2 with fat saturation, pre-contrast axial with fat saturation, post-contrast axial and coronal T1 with fat saturation.    Imaging performed on 1.5T MRI      IV Contrast:  9 mL of Gadobutrol injection (SINGLE-DOSE)      FINDINGS:     ANAL CANAL AND PERIANAL SOFT TISSUES:     MR Grade 0: The anal sphincteric complex is normal in morphology. No fistula. No supralevator or infralevator abscess.     (*[facility] Classification, [employer] 2000; 20:620-635)     ADDITIONAL FINDINGS:     BLADDER:  Normal.     REPRODUCTIVE STRUCTURES:  Age-appropriate.     BOWEL (other than above): Unremarkable.     PELVIC CAVITY:  Normal.     VESSELS: No aneurysmal dilation of the major abdominopelvic arteries.     PELVIC WALL: Unremarkable     BONES: No suspicious osseous lesion.         IMPRESSION:  Enhanced MRI of the Pelvis, Perianal Fistula Protocol     No perianal fistula identified.           ______________________________________________________________________      Assessment & Plan:     Diagnoses and all orders for this visit:    Esophageal dysphagia  -     pantoprazole (PROTONIX) 40 mg tablet; Take 1 tablet (40 mg total) by mouth daily before breakfast  -     EGD; Future    Gastroesophageal reflux disease with esophagitis without hemorrhage  -     pantoprazole (PROTONIX) 40 mg tablet; Take 1 tablet (40 mg total) by mouth daily before breakfast  -     EGD; Future    Umbilical bleeding      I feel much of the patient's issue is dietary and lifestyle.  I advised that he contact his primary for smoking cessation.  He has not done this as of yet.  When he did exercise more restrained and his diet much of his symptomatology got better.  However he is still having his upper GI issues and we will repeat his EGD.  He has occasional blood again coming from his umbilicus.  MRI was negative for fistula and I think this must be something local such as a granuloma.  He was not happy with his last surgical evaluation and I advised the [facility] his primary care for another referral.  Further recommendations will depend on study results.  I am placing him back on pantoprazole.

## 2024-02-22 NOTE — PATIENT INSTRUCTIONS
Scheduled date of EGD(as of today): 2/29/24  Physician performing EGD: Katalina  Location of EGD: [place]  Instructions reviewed with patient by: Yee LEYVA  Clearances:

## 2024-02-22 NOTE — H&P (VIEW-ONLY)
[facility]      Chief Complaint: Swallowing issues    HPI:  Abdifatah Medina is a 38 y.o.  male who presents with dysphagia.  Patient has a history of GERD with erosions esophagitis and stricture.  He has not been taking any medication.  He continues to smoke.  He did have some improvement with his lower GI symptomatology when he started eating a better diet.  He has no weight loss.  No vomiting.  No chest pain.  We reviewed his last EGD and colonoscopy.  We also reviewed the MRI from February for the bleeding from the umbilicus.  He is seeing Harry occur again.  He went to see his surgeon because of his hiatal hernia.  He thought he would have it fixed but he thought he was going to get the surgery and this was not advised at that time.  The patient apparently would like to see another surgeon.  He is not having any additional symptomatology over the last time..      Review of Systems:   Constitutional: No fever or chills, feels poorly, no tiredness, no recent weight gain or weight loss.   HENT: No complaints of earache, no hearing loss, no nosebleeds, no nasal discharge, no sore throat, no hoarseness.    Eyes: No complaints of eye pain, no red eyes, no discharge from eyes, no itchy eyes.  Cardiovascular: No complaints of slow heart rate, no fast heart rate, no chest pain, no palpitations, no leg claudication, no lower extremity edema.   Respiratory: No complaints of shortness of breath, no wheezing, no cough, no SOB on exertion, no orthopnea.   Gastrointestinal: As noted in HPI  Genitourinary: No complaints of dysuria, no incontinence, no hesitancy, no nocturia.   Musculoskeletal: No complaints of arthralgia, no myalgias, no joint swelling or stiffness, no limb pain or swelling.   Neurological: No complaints of headache, no confusion, no convulsions, no numbness or tingling, no dizziness or fainting, no limb weakness, no difficulty walking.    Skin: No complaints of skin rash or skin  lesions, no itching, no skin wound, no dry skin.    Hematological/Lymphatic: No complaints of swollen glands, does not bleed easy.   Allergic/Immunologic: No immunocompromised state.  Endocrine:  No complaints of polyuria, no polydipsia.   Psychiatric/Behavioral: is not suicidal, no sleep disturbances, no anxiety or depression, no change in personality, no emotional problems.       Historical Information   Past Medical History:   Diagnosis Date    Anal abscess 03/12/2019    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    Gomez esophagus     Diverticulitis of colon     Esophageal stricture     GERD (gastroesophageal reflux disease)     Hernia     Irritable bowel syndrome      Past Surgical History:   Procedure Laterality Date    ABCESS DRAINAGE  10/29/2018    carlos anal    ANAL FISTULOTOMY N/A 03/14/2019    Procedure: FISTULOTOMY;  Surgeon: Joey Dickey MD;  Location: [facility];  Service: Colorectal    COLONOSCOPY      MT COLONOSCOPY FLX DX W/COLLJ SPEC WHEN PFRMD N/A 11/21/2018    Procedure: COLONOSCOPY;  Surgeon: Dylan Darden MD;  Location: [facility];  Service: Gastroenterology    MT ESOPHAGOGASTRODUODENOSCOPY TRANSORAL DIAGNOSTIC N/A 11/21/2018    Procedure: ESOPHAGOGASTRODUODENOSCOPY (EGD);  Surgeon: Dylan Darden MD;  Location: [facility];  Service: Gastroenterology    MT ESOPHAGOGASTRODUODENOSCOPY TRANSORAL DIAGNOSTIC N/A 02/07/2019    Procedure: ESOPHAGOGASTRODUODENOSCOPY (EGD);  Surgeon: Dylan Darden MD;  Location: [facility];  Service: Gastroenterology    MT LAMNOTMY INCL W/DCMPRSN NRV ROOT 1 INTRSPC LUMBR Left 11/22/2019    Procedure: LAMINECTOMY LUMBAR/THORACIC; L4/5 microdiscectomy;  Surgeon: Melissa Ratliff MD;  Location:  MAIN OR;  Service: Neurosurgery    TONSILLECTOMY      UPPER GASTROINTESTINAL ENDOSCOPY       Social History   Social History     Substance and Sexual Activity   Alcohol Use Not Currently    Comment: very rarely     Social History     Substance and Sexual Activity   Drug Use Yes     "Frequency: 2.0 times per week    Types: Marijuana    Comment: weekly     Social History     Tobacco Use   Smoking Status Every Day    Current packs/day: 1.00    Average packs/day: 1 pack/day for 15.0 years (15.0 ttl pk-yrs)    Types: Cigarettes   Smokeless Tobacco Never     Family History   Problem Relation Age of Onset    Diabetes Father          Current Medications: has a current medication list which includes the following prescription(s): pantoprazole.        Vital Signs: /70   Pulse 100   Ht 5' 11\" (1.803 m)   Wt 101 kg (222 lb)   SpO2 97%   BMI 30.96 kg/m²       Physical Exam:   Constitutional  General Appearance: No acute distress, well appearing and well nourished  Head  Normocephalic  Eyes  Conjunctivae and lids: No swelling, erythema, or discharge.    Pupils and irises: Equal, round and reactive to light.   Ears, Nose, Mouth, and Throat  External inspection of ears and nose: Normal  Nasal mucosa, septum and turbinates: Normal without edema or erythema/   Oropharynx: Normal with no erythema, edema, exudate or lesions.   Neck  Normal range of motion. Neck supple.   Cardiovascular  Auscultation of the heart: Normal rate and rhythm, normal S1 and S2 without murmurs.  Examination of the extremities for edema and/or varicosities: Normal  Pulmonary/Chest  Respiratory effort: No increased work of breathing or signs of respiratory distress.   Auscultation of lungs: Clear to auscultation, equal breath sounds bilaterally, no wheezes, rales, no rhonchi.   Abdomen  Abdomen: Non-tender, no masses.   Liver and spleen: No hepatomegaly or splenomegaly.   Musculoskeletal  Gait and station: normal.  Digits and Nails: normal without clubbing or cyanosis.  Inspection/palpation of joints, bones, and muscles: Normal  Neurological  No nystagmus or asterixis.   Skin  Skin and subcutaneous tissue: Normal without rashes or lesions.   Lymphatic  Palpation of the lymph nodes in neck: No lymphadenopathy. "   Psychiatric  Orientation to person, place and time: Normal.  Mood and affect: Normal.         Labs:  Lab Results   Component Value Date    ALT 29 11/12/2022    AST 17 11/12/2022    BUN 11 11/12/2022    CALCIUM 9.3 11/12/2022     11/12/2022    CO2 26 11/12/2022    CREATININE 0.76 11/12/2022    HCT 43.6 11/12/2022    HGB 14.9 11/12/2022    HGBA1C 5.4 10/28/2019     11/12/2022    K 4.3 11/12/2022    WBC 10.36 (H) 11/12/2022         X-Rays & Procedures:   No orders to display       MRI pelvis perianal fistula wo and w contrast  Status: Final result     PACS Images     Show images for MRI pelvis perianal fistula wo and w contrast  Study Result    Narrative & Impression   MRI OF THE PELVIS WITH AND WITHOUT CONTRAST  ( PERIANAL FISTULA)     INDICATION:  Perianal fistula protocol for abnormal CT.     COMPARISON: CT enterography 2/3/2022.     TECHNIQUE: The following pulse sequences were obtained:  Axial and coronal T1 and T2, coronal T2 with fat saturation, axial T2 with fat saturation, pre-contrast axial with fat saturation, post-contrast axial and coronal T1 with fat saturation.    Imaging performed on 1.5T MRI      IV Contrast:  9 mL of Gadobutrol injection (SINGLE-DOSE)      FINDINGS:     ANAL CANAL AND PERIANAL SOFT TISSUES:     MR Grade 0: The anal sphincteric complex is normal in morphology. No fistula. No supralevator or infralevator abscess.     (*[facility] Classification, [employer] 2000; 20:620-635)     ADDITIONAL FINDINGS:     BLADDER:  Normal.     REPRODUCTIVE STRUCTURES:  Age-appropriate.     BOWEL (other than above): Unremarkable.     PELVIC CAVITY:  Normal.     VESSELS: No aneurysmal dilation of the major abdominopelvic arteries.     PELVIC WALL: Unremarkable     BONES: No suspicious osseous lesion.         IMPRESSION:  Enhanced MRI of the Pelvis, Perianal Fistula Protocol     No perianal fistula identified.           ______________________________________________________________________      Assessment & Plan:     Diagnoses and all orders for this visit:    Esophageal dysphagia  -     pantoprazole (PROTONIX) 40 mg tablet; Take 1 tablet (40 mg total) by mouth daily before breakfast  -     EGD; Future    Gastroesophageal reflux disease with esophagitis without hemorrhage  -     pantoprazole (PROTONIX) 40 mg tablet; Take 1 tablet (40 mg total) by mouth daily before breakfast  -     EGD; Future    Umbilical bleeding      I feel much of the patient's issue is dietary and lifestyle.  I advised that he contact his primary for smoking cessation.  He has not done this as of yet.  When he did exercise more restrained and his diet much of his symptomatology got better.  However he is still having his upper GI issues and we will repeat his EGD.  He has occasional blood again coming from his umbilicus.  MRI was negative for fistula and I think this must be something local such as a granuloma.  He was not happy with his last surgical evaluation and I advised the [facility] his primary care for another referral.  Further recommendations will depend on study results.  I am placing him back on pantoprazole.

## 2024-02-28 ENCOUNTER — ANESTHESIA EVENT (OUTPATIENT)
Dept: GASTROENTEROLOGY | Facility: HOSPITAL | Age: 39
End: 2024-02-28

## 2024-02-28 RX ORDER — SODIUM CHLORIDE, SODIUM LACTATE, POTASSIUM CHLORIDE, CALCIUM CHLORIDE 600; 310; 30; 20 MG/100ML; MG/100ML; MG/100ML; MG/100ML
125 INJECTION, SOLUTION INTRAVENOUS CONTINUOUS
Status: CANCELLED | OUTPATIENT
Start: 2024-02-28

## 2024-02-29 ENCOUNTER — ANESTHESIA (OUTPATIENT)
Dept: GASTROENTEROLOGY | Facility: HOSPITAL | Age: 39
End: 2024-02-29

## 2024-02-29 ENCOUNTER — HOSPITAL ENCOUNTER (OUTPATIENT)
Dept: GASTROENTEROLOGY | Facility: HOSPITAL | Age: 39
Setting detail: OUTPATIENT SURGERY
End: 2024-02-29
Attending: INTERNAL MEDICINE
Payer: COMMERCIAL

## 2024-02-29 VITALS
HEIGHT: 71 IN | OXYGEN SATURATION: 100 % | BODY MASS INDEX: 31.17 KG/M2 | RESPIRATION RATE: 16 BRPM | TEMPERATURE: 98.2 F | DIASTOLIC BLOOD PRESSURE: 57 MMHG | HEART RATE: 75 BPM | SYSTOLIC BLOOD PRESSURE: 99 MMHG | WEIGHT: 222.66 LBS

## 2024-02-29 DIAGNOSIS — K21.00 GASTROESOPHAGEAL REFLUX DISEASE WITH ESOPHAGITIS WITHOUT HEMORRHAGE: ICD-10-CM

## 2024-02-29 DIAGNOSIS — R13.19 ESOPHAGEAL DYSPHAGIA: ICD-10-CM

## 2024-02-29 PROCEDURE — 43248 EGD GUIDE WIRE INSERTION: CPT | Performed by: INTERNAL MEDICINE

## 2024-02-29 PROCEDURE — 88305 TISSUE EXAM BY PATHOLOGIST: CPT | Performed by: PATHOLOGY

## 2024-02-29 PROCEDURE — 43239 EGD BIOPSY SINGLE/MULTIPLE: CPT | Performed by: INTERNAL MEDICINE

## 2024-02-29 RX ORDER — LIDOCAINE HYDROCHLORIDE 10 MG/ML
INJECTION, SOLUTION EPIDURAL; INFILTRATION; INTRACAUDAL; PERINEURAL AS NEEDED
Status: DISCONTINUED | OUTPATIENT
Start: 2024-02-29 | End: 2024-02-29

## 2024-02-29 RX ORDER — PROPOFOL 10 MG/ML
INJECTION, EMULSION INTRAVENOUS AS NEEDED
Status: DISCONTINUED | OUTPATIENT
Start: 2024-02-29 | End: 2024-02-29

## 2024-02-29 RX ORDER — SODIUM CHLORIDE, SODIUM LACTATE, POTASSIUM CHLORIDE, CALCIUM CHLORIDE 600; 310; 30; 20 MG/100ML; MG/100ML; MG/100ML; MG/100ML
INJECTION, SOLUTION INTRAVENOUS CONTINUOUS PRN
Status: DISCONTINUED | OUTPATIENT
Start: 2024-02-29 | End: 2024-02-29

## 2024-02-29 RX ADMIN — SODIUM CHLORIDE, SODIUM LACTATE, POTASSIUM CHLORIDE, AND CALCIUM CHLORIDE: .6; .31; .03; .02 INJECTION, SOLUTION INTRAVENOUS at 07:29

## 2024-02-29 RX ADMIN — PROPOFOL 50 MG: 10 INJECTION, EMULSION INTRAVENOUS at 07:52

## 2024-02-29 RX ADMIN — PROPOFOL 50 MG: 10 INJECTION, EMULSION INTRAVENOUS at 07:53

## 2024-02-29 RX ADMIN — PROPOFOL 50 MG: 10 INJECTION, EMULSION INTRAVENOUS at 07:51

## 2024-02-29 RX ADMIN — PROPOFOL 200 MG: 10 INJECTION, EMULSION INTRAVENOUS at 07:46

## 2024-02-29 RX ADMIN — PROPOFOL 50 MG: 10 INJECTION, EMULSION INTRAVENOUS at 07:49

## 2024-02-29 RX ADMIN — LIDOCAINE HYDROCHLORIDE 50 MG: 10 INJECTION, SOLUTION EPIDURAL; INFILTRATION; INTRACAUDAL at 07:46

## 2024-02-29 NOTE — ANESTHESIA PREPROCEDURE EVALUATION
Procedure:  EGD    Relevant Problems   CARDIO   (+) Migraine      GI/HEPATIC   (+) Gomez's esophageal ulceration   (+) Dysphagia   (+) GERD (gastroesophageal reflux disease)   (+) Hiatal hernia   (+) Lesion of liver greater than 1 cm in diameter      MUSCULOSKELETAL   (+) Hiatal hernia      NEURO/PSYCH   (+) Depression   (+) Migraine      PULMONARY   (+) Sleep apnea, unspecified        Physical Exam    Airway    Mallampati score: I  TM Distance: >3 FB  Neck ROM: full     Dental   No notable dental hx     Cardiovascular      Pulmonary      Other Findings    Anesthesia Plan  ASA Score- 2     Anesthesia Type- IV sedation with anesthesia with ASA Monitors.         Additional Monitors:     Airway Plan:            Plan Factors-Exercise tolerance (METS): >4 METS.    Chart reviewed.    Patient summary reviewed.    Patient is a current smoker.  Patient instructed to abstain from smoking on day of procedure. Patient smoked on day of surgery.    There is medical exclusion for perioperative obstructive sleep apnea risk education.        Induction- intravenous.    Postoperative Plan-     Informed Consent- Anesthetic plan and risks discussed with patient.  I personally reviewed this patient with the CRNA. Discussed and agreed on the Anesthesia Plan with the CRNA..

## 2024-02-29 NOTE — INTERVAL H&P NOTE
H&P reviewed. After examining the patient I find no changes in the patients condition since the H&P had been written.    Vitals:    02/29/24 0710   BP: 117/69   Pulse: 67   Resp: 16   Temp: 97.6 °F (36.4 °C)   SpO2: 98%

## 2024-02-29 NOTE — ANESTHESIA POSTPROCEDURE EVALUATION
Post-Op Assessment Note    CV Status:  Stable  Pain Score: 0    Pain management: adequate       Mental Status:  Alert and awake   Hydration Status:  Euvolemic   PONV Controlled:  Controlled   Airway Patency:  Patent     Post Op Vitals Reviewed: Yes    No anethesia notable event occurred.    Staff: CRNA               BP 98/57 (02/29/24 0757)    Temp 98.2 °F (36.8 °C) (02/29/24 0757)    Pulse 84 (02/29/24 0757)   Resp 20 (02/29/24 0757)    SpO2 96 % (02/29/24 0757)

## 2024-03-04 PROCEDURE — 88305 TISSUE EXAM BY PATHOLOGIST: CPT | Performed by: PATHOLOGY

## 2024-03-18 ENCOUNTER — TELEPHONE (OUTPATIENT)
Age: 39
End: 2024-03-18

## 2024-03-18 NOTE — TELEPHONE ENCOUNTER
Patients GI provider:  Dr. Darden    Number to return call: [phone]     Reason for call: Pt called to go over EGD results please review and reach out thank you     Scheduled procedure/appointment date if applicable: n/a

## 2024-03-18 NOTE — TELEPHONE ENCOUNTER
Please tell the patient all the biopsies were benign, they did show Gomez's esophagus but no bad changes, we will repeat it in 1 year, and he is to stay on the medication

## 2024-03-20 ENCOUNTER — HOSPITAL ENCOUNTER (EMERGENCY)
Facility: HOSPITAL | Age: 39
Discharge: HOME | End: 2024-03-20
Payer: COMMERCIAL

## 2024-03-20 ENCOUNTER — APPOINTMENT (EMERGENCY)
Dept: RADIOLOGY | Facility: HOSPITAL | Age: 39
End: 2024-03-20
Payer: COMMERCIAL

## 2024-03-20 ENCOUNTER — APPOINTMENT (EMERGENCY)
Dept: CT IMAGING | Facility: HOSPITAL | Age: 39
End: 2024-03-20
Payer: COMMERCIAL

## 2024-03-20 VITALS
HEIGHT: 71 IN | BODY MASS INDEX: 30.9 KG/M2 | TEMPERATURE: 97.8 F | DIASTOLIC BLOOD PRESSURE: 63 MMHG | OXYGEN SATURATION: 99 % | HEART RATE: 73 BPM | WEIGHT: 220.68 LBS | SYSTOLIC BLOOD PRESSURE: 126 MMHG | RESPIRATION RATE: 16 BRPM

## 2024-03-20 DIAGNOSIS — R07.9 CHEST PAIN: ICD-10-CM

## 2024-03-20 DIAGNOSIS — R51.9 HEADACHE: Primary | ICD-10-CM

## 2024-03-20 DIAGNOSIS — M79.601 RIGHT ARM PAIN: ICD-10-CM

## 2024-03-20 LAB
ALBUMIN SERPL BCP-MCNC: 4.8 G/DL (ref 3.5–5)
ALP SERPL-CCNC: 98 U/L (ref 34–104)
ALT SERPL W P-5'-P-CCNC: 25 U/L (ref 7–52)
ANION GAP SERPL CALCULATED.3IONS-SCNC: 7 MMOL/L (ref 4–13)
AST SERPL W P-5'-P-CCNC: 21 U/L (ref 13–39)
ATRIAL RATE: 84 BPM
BASOPHILS # BLD AUTO: 0.06 THOUSANDS/ÂΜL (ref 0–0.1)
BASOPHILS NFR BLD AUTO: 0 % (ref 0–1)
BILIRUB SERPL-MCNC: 0.5 MG/DL (ref 0.2–1)
BUN SERPL-MCNC: 13 MG/DL (ref 5–25)
CALCIUM SERPL-MCNC: 9.9 MG/DL (ref 8.4–10.2)
CARDIAC TROPONIN I PNL SERPL HS: <2 NG/L
CHLORIDE SERPL-SCNC: 103 MMOL/L (ref 96–108)
CO2 SERPL-SCNC: 27 MMOL/L (ref 21–32)
CREAT SERPL-MCNC: 0.78 MG/DL (ref 0.6–1.3)
EOSINOPHIL # BLD AUTO: 0.11 THOUSAND/ÂΜL (ref 0–0.61)
EOSINOPHIL NFR BLD AUTO: 1 % (ref 0–6)
ERYTHROCYTE [DISTWIDTH] IN BLOOD BY AUTOMATED COUNT: 12.8 % (ref 11.6–15.1)
GFR SERPL CREATININE-BSD FRML MDRD: 115 ML/MIN/1.73SQ M
GLUCOSE SERPL-MCNC: 107 MG/DL (ref 65–140)
HCT VFR BLD AUTO: 45.6 % (ref 36.5–49.3)
HGB BLD-MCNC: 15.8 G/DL (ref 12–17)
IMM GRANULOCYTES # BLD AUTO: 0.04 THOUSAND/UL (ref 0–0.2)
IMM GRANULOCYTES NFR BLD AUTO: 0 % (ref 0–2)
LIPASE SERPL-CCNC: 14 U/L (ref 11–82)
LYMPHOCYTES # BLD AUTO: 2 THOUSANDS/ÂΜL (ref 0.6–4.47)
LYMPHOCYTES NFR BLD AUTO: 14 % (ref 14–44)
MCH RBC QN AUTO: 32.7 PG (ref 26.8–34.3)
MCHC RBC AUTO-ENTMCNC: 34.6 G/DL (ref 31.4–37.4)
MCV RBC AUTO: 94 FL (ref 82–98)
MONOCYTES # BLD AUTO: 0.87 THOUSAND/ÂΜL (ref 0.17–1.22)
MONOCYTES NFR BLD AUTO: 6 % (ref 4–12)
NEUTROPHILS # BLD AUTO: 10.92 THOUSANDS/ÂΜL (ref 1.85–7.62)
NEUTS SEG NFR BLD AUTO: 79 % (ref 43–75)
NRBC BLD AUTO-RTO: 0 /100 WBCS
P AXIS: 66 DEGREES
PLATELET # BLD AUTO: 261 THOUSANDS/UL (ref 149–390)
PMV BLD AUTO: 10.2 FL (ref 8.9–12.7)
POTASSIUM SERPL-SCNC: 3.8 MMOL/L (ref 3.5–5.3)
PR INTERVAL: 144 MS
PROT SERPL-MCNC: 7.4 G/DL (ref 6.4–8.4)
QRS AXIS: 49 DEGREES
QRSD INTERVAL: 96 MS
QT INTERVAL: 354 MS
QTC INTERVAL: 418 MS
RBC # BLD AUTO: 4.83 MILLION/UL (ref 3.88–5.62)
SODIUM SERPL-SCNC: 137 MMOL/L (ref 135–147)
T WAVE AXIS: 6 DEGREES
VENTRICULAR RATE: 84 BPM
WBC # BLD AUTO: 14 THOUSAND/UL (ref 4.31–10.16)

## 2024-03-20 PROCEDURE — 70498 CT ANGIOGRAPHY NECK: CPT

## 2024-03-20 PROCEDURE — 73030 X-RAY EXAM OF SHOULDER: CPT

## 2024-03-20 PROCEDURE — 96361 HYDRATE IV INFUSION ADD-ON: CPT

## 2024-03-20 PROCEDURE — 84484 ASSAY OF TROPONIN QUANT: CPT

## 2024-03-20 PROCEDURE — 83690 ASSAY OF LIPASE: CPT

## 2024-03-20 PROCEDURE — 96360 HYDRATION IV INFUSION INIT: CPT

## 2024-03-20 PROCEDURE — 93010 ELECTROCARDIOGRAM REPORT: CPT | Performed by: INTERNAL MEDICINE

## 2024-03-20 PROCEDURE — 36415 COLL VENOUS BLD VENIPUNCTURE: CPT

## 2024-03-20 PROCEDURE — 71045 X-RAY EXAM CHEST 1 VIEW: CPT

## 2024-03-20 PROCEDURE — 80053 COMPREHEN METABOLIC PANEL: CPT

## 2024-03-20 PROCEDURE — 99284 EMERGENCY DEPT VISIT MOD MDM: CPT

## 2024-03-20 PROCEDURE — 93005 ELECTROCARDIOGRAM TRACING: CPT

## 2024-03-20 PROCEDURE — 70496 CT ANGIOGRAPHY HEAD: CPT

## 2024-03-20 PROCEDURE — 85025 COMPLETE CBC W/AUTO DIFF WBC: CPT

## 2024-03-20 RX ADMIN — SODIUM CHLORIDE 1000 ML: 0.9 INJECTION, SOLUTION INTRAVENOUS at 10:30

## 2024-03-20 RX ADMIN — IOHEXOL 85 ML: 350 INJECTION, SOLUTION INTRAVENOUS at 11:13

## 2024-03-20 NOTE — DISCHARGE INSTRUCTIONS
You were seen in the ED for headache and chest pain. Return to the ED for any worsening symptoms or new symptoms. Follow up with your primary care doctor and neurologist as soon as possible.

## 2024-03-20 NOTE — ED PROVIDER NOTES
"History  Chief Complaint   Patient presents with    Headache     Patient states\"Sunday he began with a really bad headache\". Patient states\"he began with really bad neck pain in the back of his neck\" Patient reports vomiting blood after the headache on Sunday.  Patient states\"last night around 10pm he began with really bad chest pain that resolved two minutes later\". Patient reports waking up this morning around 6:30am with right shoulder pain that radiates down to right hand. Patient reports right eye does not feel right and is drooping.     38-year-old male patient with a history of Gomez's esophagus, presents to the ED complaining of sudden onset headache onset 3 days ago.  Patient states that he was sitting when he had a sudden onset headache that radiated to his neck.  Patient states that the headache lingered and then improved and improved today.  Patient states that the headache completely resolved today.  Patient states that during that episode he had an episode of emesis with some blood streaks.  Patient states only 1 episode.  Patient states that his symptoms have been improving but then yesterday night he had an episode of chest pain that lasted 2 minutes that resolved in his left side.  Patient states that he felt short of breath during that time.  Patient states that today when he woke up he was having some right shoulder pain radiating down to his right hand and he felt like his right eye did not feel right.  Denies any fevers, abdominal pain, urinary symptoms but admits to some mild diarrhea.        Prior to Admission Medications   Prescriptions Last Dose Informant Patient Reported? Taking?   pantoprazole (PROTONIX) 40 mg tablet   No No   Sig: Take 1 tablet (40 mg total) by mouth daily before breakfast      Facility-Administered Medications: None       Past Medical History:   Diagnosis Date    Anal abscess 03/12/2019    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    Gomez esophagus     Diverticulitis of colon     " Esophageal stricture     GERD (gastroesophageal reflux disease)     Hernia     Irritable bowel syndrome        Past Surgical History:   Procedure Laterality Date    ABCESS DRAINAGE  10/29/2018    carlos anal    ANAL FISTULOTOMY N/A 03/14/2019    Procedure: FISTULOTOMY;  Surgeon: Joey Dickey MD;  Location: [facility];  Service: Colorectal    COLONOSCOPY      NC COLONOSCOPY FLX DX W/COLLJ SPEC WHEN PFRMD N/A 11/21/2018    Procedure: COLONOSCOPY;  Surgeon: [place] Darden MD;  Location: [facility];  Service: Gastroenterology    NC ESOPHAGOGASTRODUODENOSCOPY TRANSORAL DIAGNOSTIC N/A 11/21/2018    Procedure: ESOPHAGOGASTRODUODENOSCOPY (EGD);  Surgeon: [place] Darden MD;  Location: [facility];  Service: Gastroenterology    NC ESOPHAGOGASTRODUODENOSCOPY TRANSORAL DIAGNOSTIC N/A 02/07/2019    Procedure: ESOPHAGOGASTRODUODENOSCOPY (EGD);  Surgeon: [place] Darden MD;  Location: [facility];  Service: Gastroenterology    NC LAMNOTMY INCL W/DCMPRSN NRV ROOT 1 INTRSPC LUMBR Left 11/22/2019    Procedure: LAMINECTOMY LUMBAR/THORACIC; L4/5 microdiscectomy;  Surgeon: Melissa Ratliff MD;  Location:  MAIN OR;  Service: Neurosurgery    TONSILLECTOMY      UPPER GASTROINTESTINAL ENDOSCOPY         Family History   Problem Relation Age of Onset    Diabetes Father      I have reviewed and agree with the history as documented.    E-Cigarette/Vaping    E-Cigarette Use Never User      E-Cigarette/Vaping Substances    Nicotine No     THC No     CBD No     Flavoring No     Other No     Unknown No      Social History     Tobacco Use    Smoking status: Every Day     Current packs/day: 1.00     Average packs/day: 1 pack/day for 15.0 years (15.0 ttl pk-yrs)     Types: Cigarettes    Smokeless tobacco: Never   Vaping Use    Vaping status: Never Used   Substance Use Topics    Alcohol use: Not Currently     Comment: very rarely    Drug use: Not Currently     Types: Marijuana     Comment: month ago       Review of Systems   Cardiovascular:   Positive for chest pain.   Gastrointestinal:  Positive for vomiting.   Musculoskeletal:  Positive for neck pain.   Neurological:  Positive for dizziness and headaches.   All other systems reviewed and are negative.      Physical Exam  Physical Exam  Vitals reviewed.   Constitutional:       Appearance: Normal appearance.   HENT:      Head: Normocephalic and atraumatic.      Nose: Nose normal.      Mouth/Throat:      Mouth: Mucous membranes are moist.      Pharynx: Oropharynx is clear.   Eyes:      Extraocular Movements: Extraocular movements intact.      Conjunctiva/sclera: Conjunctivae normal.   Cardiovascular:      Rate and Rhythm: Normal rate and regular rhythm.      Pulses: Normal pulses.      Heart sounds: Normal heart sounds.   Pulmonary:      Effort: Pulmonary effort is normal.      Breath sounds: Normal breath sounds.   Abdominal:      General: Bowel sounds are normal.      Palpations: Abdomen is soft.      Tenderness: There is no abdominal tenderness.   Musculoskeletal:         General: Normal range of motion.      Cervical back: Normal range of motion.   Skin:     General: Skin is warm and dry.   Neurological:      General: No focal deficit present.      Mental Status: He is alert and oriented to person, place, and time. Mental status is at baseline.      Cranial Nerves: No cranial nerve deficit.      Sensory: No sensory deficit.      Motor: No weakness.      Coordination: Coordination normal.         Vital Signs  ED Triage Vitals   Temperature Pulse Respirations Blood Pressure SpO2   03/20/24 1000 03/20/24 1000 03/20/24 1000 03/20/24 1000 03/20/24 1000   97.8 °F (36.6 °C) 92 16 144/74 100 %      Temp Source Heart Rate Source Patient Position - Orthostatic VS BP Location FiO2 (%)   03/20/24 1000 03/20/24 1000 03/20/24 1000 03/20/24 1000 --   Temporal Monitor Sitting Left arm       Pain Score       03/20/24 1030       No Pain           Vitals:    03/20/24 1000 03/20/24 1030 03/20/24 1100 03/20/24 1245   BP:  144/74 145/77 138/76 126/63   Pulse: 92 71 70 73   Patient Position - Orthostatic VS: Sitting Lying Lying Lying         Visual Acuity  Visual Acuity      Flowsheet Row Most Recent Value   L Pupil Size (mm) 3   R Pupil Size (mm) 3            ED Medications  Medications   sodium chloride 0.9 % bolus 1,000 mL (0 mL Intravenous Stopped 3/20/24 1301)   iohexol (OMNIPAQUE) 350 MG/ML injection (MULTI-DOSE) 85 mL (85 mL Intravenous Given 3/20/24 1113)       Diagnostic Studies  Results Reviewed       Procedure Component Value Units Date/Time    HS Troponin 0hr (reflex protocol) [[id number]]  (Normal) Collected: 03/20/24 1028    Lab Status: Final result Specimen: Blood from Arm, Left Updated: 03/20/24 1106     hs TnI 0hr <2 ng/L     Comprehensive metabolic panel [[id number]] Collected: 03/20/24 1028    Lab Status: Final result Specimen: Blood from Arm, Left Updated: 03/20/24 1103     Sodium 137 mmol/L      Potassium 3.8 mmol/L      Chloride 103 mmol/L      CO2 27 mmol/L      ANION GAP 7 mmol/L      BUN 13 mg/dL      Creatinine 0.78 mg/dL      Glucose 107 mg/dL      Calcium 9.9 mg/dL      AST 21 U/L      ALT 25 U/L      Alkaline Phosphatase 98 U/L      Total Protein 7.4 g/dL      Albumin 4.8 g/dL      Total Bilirubin 0.50 mg/dL      eGFR 115 ml/min/1.73sq m     Narrative:      National Kid[employer] Foundation guidelines for Chronic Kidney Disease (CKD):     Stage 1 with normal or high GFR (GFR > 90 mL/min/1.73 square meters)    Stage 2 Mild CKD (GFR = 60-89 mL/min/1.73 square meters)    Stage 3A Moderate CKD (GFR = 45-59 mL/min/1.73 square meters)    Stage 3B Moderate CKD (GFR = 30-44 mL/min/1.73 square meters)    Stage 4 Severe CKD (GFR = 15-29 mL/min/1.73 square meters)    Stage 5 End Stage CKD (GFR <15 mL/min/1.73 square meters)  Note: GFR calculation is accurate only with a steady state creatinine    Lipase [[id number]]  (Normal) Collected: 03/20/24 1028    Lab Status: Final result Specimen: Blood from Arm, Left Updated:  03/20/24 1103     Lipase 14 u/L     CBC and differential [[id number]]  (Abnormal) Collected: 03/20/24 1028    Lab Status: Final result Specimen: Blood from Arm, Left Updated: 03/20/24 1043     WBC 14.00 Thousand/uL      RBC 4.83 Million/uL      Hemoglobin 15.8 g/dL      Hematocrit 45.6 %      MCV 94 fL      MCH 32.7 pg      MCHC 34.6 g/dL      RDW 12.8 %      MPV 10.2 fL      Platelets 261 Thousands/uL      nRBC 0 /100 WBCs      Neutrophils Relative 79 %      Immature Grans % 0 %      Lymphocytes Relative 14 %      Monocytes Relative 6 %      Eosinophils Relative 1 %      Basophils Relative 0 %      Neutrophils Absolute 10.92 Thousands/µL      Absolute Immature Grans 0.04 Thousand/uL      Absolute Lymphocytes 2.00 Thousands/µL      Absolute Monocytes 0.87 Thousand/µL      Eosinophils Absolute 0.11 Thousand/µL      Basophils Absolute 0.06 Thousands/µL                    CTA head and neck with and without contrast   Final Result by Nelson Richards MD (03/20 1210)      CT head:   -No acute intracranial abnormality.   -Mild paranasal sinus disease.      CTA head/neck:   -No large vessel occlusion, high-grade stenosis, dissection or aneurysm                  Workstation performed: [id number]         XR chest portable   ED Interpretation by Joaquim Jacobs MD (03/20 1045)   No acute cardiopulm diease      XR shoulder 2+ views RIGHT    (Results Pending)              Procedures  Procedures         ED Course  ED Course as of 03/20/24 1447   Wed Mar 20, 2024   1047 EKG: normal EKG, normal sinus rhythm, unchanged from previous tracings     1219 Spoke with patient at length about doing an LP to r/o subarachanoid. Listened risks and benefits of doing an LP and also spoke about the negative CTA head and neck. At this time, patient refusing an LP. Spoke with patient that this is likely the best test, but unlikely with no symptoms currently and negative CTA head and neck. Patient expressed understanding and refused LP. Agrees to return  if new symptoms arise.              HEART Risk Score      Flowsheet Row Most Recent Value   Heart Score Risk Calculator    History 0 Filed at: 03/20/2024 1107   ECG 0 Filed at: 03/20/2024 1107   Age 0 Filed at: 03/20/2024 1107   Risk Factors 1 Filed at: 03/20/2024 1107   Troponin 0 Filed at: 03/20/2024 1107   HEART Score 1 Filed at: 03/20/2024 1107                          SBIRT 22yo+      Flowsheet Row Most Recent Value   Initial Alcohol Screen: US AUDIT-C     1. How often do you have a drink containing alcohol? 0 Filed at: 03/20/2024 1003   2. How many drinks containing alcohol do you have on a typical day you are drinking?  0 Filed at: 03/20/2024 1003   3a. Male UNDER 65: How often do you have five or more drinks on one occasion? 0 Filed at: 03/20/2024 1003   3b. FEMALE Any Age, or MALE 65+: How often do you have 4 or more drinks on one occassion? 0 Filed at: 03/20/2024 1003   Audit-C Score 0 Filed at: 03/20/2024 1003   MICHA: How many times in the past year have you...    Used an illegal drug or used a prescription medication for non-medical reasons? Never Filed at: 03/20/2024 1003                      Medical Decision Making  39 y/o male patient presents to ed c/o episode of sudden headache, vomiting, followed by CP 2 days later. States all resolved but has some shoulder pain now. Ddx includes acs, anxiety, subarachanoid, tension HA. Patient states HA resolved. Exam WNL. No neuro defectis, no HA or CP currently. CTA head and neck negative. Trop negative. D/w pateint at length about risks and benefits of LP, including risks for infection, bleeding and benefitis of helping diagnosis of SA. Patient declined at this time. Stable for discharge at s time with neuro. Return precautions given.     Amount and/or Complexity of Data Reviewed  Labs: ordered.  Radiology: ordered and independent interpretation performed.    Risk  Prescription drug management.             Disposition  Final diagnoses:   Headache   Chest pain    Right arm pain     Time reflects when diagnosis was documented in both MDM as applicable and the Disposition within this note       Time User Action Codes Description Comment    3/20/2024 12:23 PM Joaquim Jacobs [R51.9] Headache     3/20/2024 12:24 PM Joaquim Jacobs Add [R07.9] Chest pain     3/20/2024 12:24 PM Joaquim Jacobs Add [M79.601] Right arm pain           ED Disposition       ED Disposition   Discharge    Condition   Stable    Date/Time   Wed Mar 20, 2024 1243    Comment   Abdifatah Medina discharge to home/self care.                   Follow-up Information       Follow up With Specialties Details Why Contact Info Additional Information    [facility],  Internal Medicine Schedule an appointment as soon as possible for a visit   [address] [place] [address]  [address]  [place] [id number] [phone]       [facility] Neurology Schedule an appointment as soon as possible for a visit   [address] [place] [id number] [phone] [facility], [address] [place], [place], [address] [phone]            Discharge Medication List as of 3/20/2024 12:43 PM        CONTINUE these medications which have NOT CHANGED    Details   pantoprazole (PROTONIX) 40 mg tablet Take 1 tablet (40 mg total) by mouth daily before breakfast, Starting Thu 2/22/2024, Print             No discharge procedures on file.    PDMP Review       None            ED Provider  Electronically Signed by             Joaquim Jcaobs MD  03/20/24 2256

## 2024-03-21 NOTE — TELEPHONE ENCOUNTER
Patient returning phone call.  Aware of results of biopsy.  Verbalized understanding and need for repeat in 1 year.

## 2024-12-26 ENCOUNTER — TELEPHONE (OUTPATIENT)
Age: 39
End: 2024-12-26

## (undated) DEVICE — GAUZE SPONGES,16 PLY: Brand: CURITY

## (undated) DEVICE — PLUMEPEN PRO 10FT

## (undated) DEVICE — ELECTRODE BLADE MOD E-Z CLEAN 2.5IN 6.4CM -0012M

## (undated) DEVICE — BETHLEHEM UNIVERSAL SPINE, KIT: Brand: CARDINAL HEALTH

## (undated) DEVICE — GLOVE SRG BIOGEL ECLIPSE 7.5

## (undated) DEVICE — SUT VICRYL 2-0 SH 27 IN UNDYED J417H

## (undated) DEVICE — NEEDLE SPINAL18G X 3.5 IN QUINCKE

## (undated) DEVICE — SURGIFOAM 8.5 X 12.5

## (undated) DEVICE — SPONGE PVP SCRUB WING STERILE

## (undated) DEVICE — COVER BOW FRAME SKIN CARE KIT

## (undated) DEVICE — 3M™ TEGADERM™ TRANSPARENT FILM DRESSING FRAME STYLE, 1626W, 4 IN X 4-3/4 IN (10 CM X 12 CM), 50/CT 4CT/CASE: Brand: 3M™ TEGADERM™

## (undated) DEVICE — INTENDED FOR TISSUE SEPARATION, AND OTHER PROCEDURES THAT REQUIRE A SHARP SURGICAL BLADE TO PUNCTURE OR CUT.: Brand: BARD-PARKER SAFETY BLADES SIZE 15, STERILE

## (undated) DEVICE — GLOVE SRG BIOGEL 8

## (undated) DEVICE — REM POLYHESIVE ADULT PATIENT RETURN ELECTRODE: Brand: VALLEYLAB

## (undated) DEVICE — ABDOMINAL PAD: Brand: DERMACEA

## (undated) DEVICE — DISPOSABLE EQUIPMENT COVER: Brand: SMALL TOWEL DRAPE

## (undated) DEVICE — SUT VICRYL 4-0 PS-2 27 IN J426H

## (undated) DEVICE — GAUZE SPONGES,USP TYPE VII GAUZE, 12 PLY: Brand: CURITY

## (undated) DEVICE — DRAPE ADOLESCENT LAPAROTOMY

## (undated) DEVICE — PREP SURGICAL PURPREP 26ML

## (undated) DEVICE — PENCIL ELECTROSURG E-Z CLEAN -0035H

## (undated) DEVICE — TOOL 14MH30 LEGEND 14CM 3MM: Brand: MIDAS REX ™

## (undated) DEVICE — DRAPE MICROSCOPE OPMI PENTERO

## (undated) DEVICE — FLOSEAL HEMOSTATIC MATRIX, 5 ML: Brand: FLOSEAL

## (undated) DEVICE — POOLE SUCTION HANDLE: Brand: CARDINAL HEALTH

## (undated) DEVICE — BETHLEHEM UNIVERSAL MINOR GEN: Brand: CARDINAL HEALTH

## (undated) DEVICE — INTENDED FOR TISSUE SEPARATION, AND OTHER PROCEDURES THAT REQUIRE A SHARP SURGICAL BLADE TO PUNCTURE OR CUT.: Brand: BARD-PARKER ® CARBON RIB-BACK BLADES

## (undated) DEVICE — SCD SEQUENTIAL COMPRESSION COMFORT SLEEVE MEDIUM KNEE LENGTH: Brand: KENDALL SCD

## (undated) DEVICE — TELFA NON-ADHERENT ABSORBENT DRESSING: Brand: TELFA

## (undated) DEVICE — GLOVE INDICATOR PI UNDERGLOVE SZ 8 BLUE

## (undated) DEVICE — LIGHT HANDLE COVER SLEEVE DISP BLUE STELLAR

## (undated) DEVICE — SPONGE STICK WITH PVP-I: Brand: KENDALL

## (undated) DEVICE — VIAL DECANTER

## (undated) DEVICE — DISPOSABLE BRIEF/UNDERWEAR

## (undated) DEVICE — INTENDED FOR TISSUE SEPARATION, AND OTHER PROCEDURES THAT REQUIRE A SHARP SURGICAL BLADE TO PUNCTURE OR CUT.: Brand: BARD-PARKER SAFETY BLADES SIZE 10, STERILE